# Patient Record
Sex: MALE | Race: WHITE | NOT HISPANIC OR LATINO
[De-identification: names, ages, dates, MRNs, and addresses within clinical notes are randomized per-mention and may not be internally consistent; named-entity substitution may affect disease eponyms.]

---

## 2017-05-07 ENCOUNTER — HOSPITAL (OUTPATIENT)
Dept: OTHER | Age: 22
End: 2017-05-07
Attending: EMERGENCY MEDICINE

## 2017-05-07 LAB
AMPHETAMINES UR QL SCN>500 NG/ML: NEGATIVE
ANALYZER ANC (IANC): ABNORMAL
ANION GAP SERPL CALC-SCNC: 12 MMOL/L (ref 10–20)
BARBITURATES UR QL SCN>200 NG/ML: NEGATIVE
BASOPHILS # BLD: 0.1 THOUSAND/MCL (ref 0–0.3)
BASOPHILS NFR BLD: 1 %
BENZODIAZ UR QL SCN>200 NG/ML: NEGATIVE
BUN SERPL-MCNC: 18 MG/DL (ref 6–20)
BUN/CREAT SERPL: 19 (ref 7–25)
BZE UR QL SCN>150 NG/ML: NEGATIVE
CALCIUM SERPL-MCNC: 9.1 MG/DL (ref 8.4–10.2)
CANNABINOIDS UR QL SCN>50 NG/ML: NEGATIVE
CHLORIDE: 103 MMOL/L (ref 98–107)
CO2 SERPL-SCNC: 30 MMOL/L (ref 21–32)
CREAT SERPL-MCNC: 0.93 MG/DL (ref 0.67–1.17)
DIFFERENTIAL METHOD BLD: ABNORMAL
EOSINOPHIL # BLD: 0.2 THOUSAND/MCL (ref 0.1–0.5)
EOSINOPHIL NFR BLD: 2 %
ERYTHROCYTE [DISTWIDTH] IN BLOOD: 12.5 % (ref 11–15)
GLUCOSE SERPL-MCNC: 101 MG/DL (ref 65–99)
HEMATOCRIT: 46.4 % (ref 39–51)
HGB BLD-MCNC: 16.3 GM/DL (ref 13–17)
LYMPHOCYTES # BLD: 3.2 THOUSAND/MCL (ref 1–4.8)
LYMPHOCYTES NFR BLD: 26 %
MCH RBC QN AUTO: 31.1 PG (ref 26–34)
MCHC RBC AUTO-ENTMCNC: 35.1 GM/DL (ref 32–36.5)
MCV RBC AUTO: 88.5 FL (ref 78–100)
MONOCYTES # BLD: 1.2 THOUSAND/MCL (ref 0.3–0.9)
MONOCYTES NFR BLD: 10 %
NEUTROPHILS # BLD: 7.8 THOUSAND/MCL (ref 1.8–7.7)
NEUTROPHILS NFR BLD: 61 %
NEUTS SEG NFR BLD: ABNORMAL %
OPIATES UR QL SCN>300 NG/ML: NEGATIVE
PCP UR QL SCN>25 NG/ML: NEGATIVE
PERCENT NRBC: ABNORMAL
PLATELET # BLD: 277 THOUSAND/MCL (ref 140–450)
POTASSIUM SERPL-SCNC: 4.1 MMOL/L (ref 3.4–5.1)
RBC # BLD: 5.24 MILLION/MCL (ref 4.5–5.9)
SODIUM SERPL-SCNC: 141 MMOL/L (ref 135–145)
TSH SERPL-ACNC: 3.36 MCUNIT/ML (ref 0.35–5)
WBC # BLD: 12.6 THOUSAND/MCL (ref 4.2–11)

## 2017-12-06 ENCOUNTER — LAB ENCOUNTER (OUTPATIENT)
Dept: LAB | Facility: HOSPITAL | Age: 22
End: 2017-12-06
Attending: INTERNAL MEDICINE
Payer: COMMERCIAL

## 2017-12-06 DIAGNOSIS — R53.83 FATIGUE: ICD-10-CM

## 2017-12-06 DIAGNOSIS — E16.2 HYPOGLYCEMIA, UNSPECIFIED: Primary | ICD-10-CM

## 2017-12-06 DIAGNOSIS — F41.9 ANXIETY HYPERVENTILATION: ICD-10-CM

## 2017-12-06 DIAGNOSIS — F45.8 ANXIETY HYPERVENTILATION: ICD-10-CM

## 2017-12-06 PROCEDURE — 86376 MICROSOMAL ANTIBODY EACH: CPT

## 2017-12-06 PROCEDURE — 84443 ASSAY THYROID STIM HORMONE: CPT

## 2017-12-06 PROCEDURE — 81001 URINALYSIS AUTO W/SCOPE: CPT

## 2017-12-06 PROCEDURE — 83036 HEMOGLOBIN GLYCOSYLATED A1C: CPT

## 2017-12-06 PROCEDURE — 36415 COLL VENOUS BLD VENIPUNCTURE: CPT

## 2017-12-06 PROCEDURE — 82607 VITAMIN B-12: CPT

## 2017-12-06 PROCEDURE — 82306 VITAMIN D 25 HYDROXY: CPT

## 2017-12-06 PROCEDURE — 84681 ASSAY OF C-PEPTIDE: CPT

## 2017-12-06 PROCEDURE — 84439 ASSAY OF FREE THYROXINE: CPT

## 2017-12-06 PROCEDURE — 85025 COMPLETE CBC W/AUTO DIFF WBC: CPT

## 2017-12-06 PROCEDURE — 80053 COMPREHEN METABOLIC PANEL: CPT

## 2017-12-06 PROCEDURE — 84146 ASSAY OF PROLACTIN: CPT

## 2018-01-11 ENCOUNTER — OFFICE VISIT (OUTPATIENT)
Dept: SURGERY | Facility: CLINIC | Age: 23
End: 2018-01-11

## 2018-01-11 VITALS
HEART RATE: 80 BPM | BODY MASS INDEX: 28.23 KG/M2 | TEMPERATURE: 98 F | WEIGHT: 220 LBS | HEIGHT: 74 IN | SYSTOLIC BLOOD PRESSURE: 126 MMHG | RESPIRATION RATE: 16 BRPM | DIASTOLIC BLOOD PRESSURE: 80 MMHG

## 2018-01-11 DIAGNOSIS — R31.29 MICROHEMATURIA: Primary | ICD-10-CM

## 2018-01-11 PROCEDURE — 99204 OFFICE O/P NEW MOD 45 MIN: CPT | Performed by: UROLOGY

## 2018-01-11 PROCEDURE — 99212 OFFICE O/P EST SF 10 MIN: CPT | Performed by: UROLOGY

## 2018-01-11 RX ORDER — ALPRAZOLAM 1 MG/1
1 TABLET ORAL NIGHTLY PRN
Refills: 0 | COMMUNITY
Start: 2017-12-28 | End: 2021-02-12 | Stop reason: ALTCHOICE

## 2018-01-11 NOTE — PROGRESS NOTES
SUBJECTIVE:  Dennie Martens is a 23year old male who is a new patient to our department, and presents with a chief complaint of dysuria and hematuria. He states that the problem is unchanged.  Symptoms include noted on a recent urine test December 6, 2017 to and oriented times three, pleasant and cooperative.   CARDIOVASCULAR:normal apical impulse  RESPIRATORY: no chest wall deformities or tenderness  ABDOMEN: abdomen is soft without significant tenderness, masses, organomegaly or guarding  GENITOURINARY:

## 2018-02-15 ENCOUNTER — TELEPHONE (OUTPATIENT)
Dept: SURGERY | Facility: CLINIC | Age: 23
End: 2018-02-15

## 2018-02-15 NOTE — TELEPHONE ENCOUNTER
Patient cancelled appt for 02/19/18 for Uroflow bladder US with IRINA  is looking to reschedule. Please call.  Thank you

## 2018-02-21 NOTE — TELEPHONE ENCOUNTER
Spoke with patient states that he does not want to reschedule until a couple of months from now and will call back to reschedule.

## 2018-04-13 ENCOUNTER — OFFICE VISIT (OUTPATIENT)
Dept: FAMILY MEDICINE CLINIC | Facility: CLINIC | Age: 23
End: 2018-04-13

## 2018-04-13 VITALS
WEIGHT: 217 LBS | DIASTOLIC BLOOD PRESSURE: 72 MMHG | HEIGHT: 74 IN | TEMPERATURE: 98 F | SYSTOLIC BLOOD PRESSURE: 106 MMHG | HEART RATE: 99 BPM | BODY MASS INDEX: 27.85 KG/M2

## 2018-04-13 DIAGNOSIS — F41.9 ANXIETY: ICD-10-CM

## 2018-04-13 DIAGNOSIS — Z00.00 ROUTINE GENERAL MEDICAL EXAMINATION AT A HEALTH CARE FACILITY: Primary | ICD-10-CM

## 2018-04-13 PROCEDURE — 99385 PREV VISIT NEW AGE 18-39: CPT | Performed by: PHYSICIAN ASSISTANT

## 2018-04-13 RX ORDER — BUSPIRONE HYDROCHLORIDE 10 MG/1
TABLET ORAL 2 TIMES DAILY
COMMUNITY

## 2018-04-13 NOTE — PROGRESS NOTES
HPI:   Farhana Lipscomb is a 23year old male who presents for new patient visit today. He has history of anxiety which is well controlled on current medication regimen. He takes buspar twice daily and takes alprazolam at night.   He sees psychatric nurse prac Denies chest pain, chest pressure, chest discomfort, palpitations, edema, dyspnea on exertion or at rest.  RESPIRATORY:  Denies shortness of breath, wheezing, cough or sputum.   GASTROINTESTINAL:  Denies abdominal pain, nausea, vomiting, constipation, diarr tenderness, no spasm. EXTREMITIES:  No edema, no cyanosis, no clubbing, FROM, 2+ dorsalis pedis pulses bilaterally. NEURO:  No deficit, normal gait, strength and tone, sensory intact.     Assessment and Plan:     Routine general medical examination at a h

## 2018-06-09 ENCOUNTER — HOSPITAL (OUTPATIENT)
Dept: OTHER | Age: 23
End: 2018-06-09
Attending: EMERGENCY MEDICINE

## 2018-06-11 ENCOUNTER — OFFICE VISIT (OUTPATIENT)
Dept: FAMILY MEDICINE CLINIC | Facility: CLINIC | Age: 23
End: 2018-06-11

## 2018-06-11 VITALS
SYSTOLIC BLOOD PRESSURE: 114 MMHG | TEMPERATURE: 98 F | BODY MASS INDEX: 28 KG/M2 | DIASTOLIC BLOOD PRESSURE: 76 MMHG | HEART RATE: 75 BPM | WEIGHT: 218 LBS

## 2018-06-11 DIAGNOSIS — S01.81XD FACIAL LACERATION, SUBSEQUENT ENCOUNTER: ICD-10-CM

## 2018-06-11 DIAGNOSIS — S06.0X0D CONCUSSION WITHOUT LOSS OF CONSCIOUSNESS, SUBSEQUENT ENCOUNTER: ICD-10-CM

## 2018-06-11 DIAGNOSIS — S09.90XD INJURY OF HEAD, SUBSEQUENT ENCOUNTER: Primary | ICD-10-CM

## 2018-06-11 PROCEDURE — 99212 OFFICE O/P EST SF 10 MIN: CPT | Performed by: FAMILY MEDICINE

## 2018-06-11 PROCEDURE — 99214 OFFICE O/P EST MOD 30 MIN: CPT | Performed by: FAMILY MEDICINE

## 2018-06-11 RX ORDER — NAPROXEN 500 MG/1
500 TABLET ORAL 2 TIMES DAILY WITH MEALS
Qty: 30 TABLET | Refills: 1 | Status: SHIPPED | OUTPATIENT
Start: 2018-06-11 | End: 2021-01-18

## 2018-06-11 RX ORDER — HYDROCODONE BITARTRATE AND ACETAMINOPHEN 7.5; 325 MG/1; MG/1
1 TABLET ORAL EVERY 6 HOURS PRN
Qty: 20 TABLET | Refills: 0 | Status: SHIPPED | OUTPATIENT
Start: 2018-06-11 | End: 2018-06-16

## 2018-06-11 NOTE — PROGRESS NOTES
HPI:    Patient ID: Dario Barba is a 23year old male. HPI  Patient presents with:  ER F/U: f/u [facility]- 3 stitches on right eyebrow, concussion  Had too much alcohol to drink and lost balance and fell.  Seen and treated and now mild to moderate Imaging & Referrals:  None       SC#[id number]

## 2018-06-15 ENCOUNTER — TELEPHONE (OUTPATIENT)
Dept: OTHER | Age: 23
End: 2018-06-15

## 2018-06-15 NOTE — TELEPHONE ENCOUNTER
Pt is calling for status of his medication refill request. Pt can be reached at [phone]. Per pt he is out of medication.

## 2018-06-15 NOTE — TELEPHONE ENCOUNTER
Please advise on refill request.     Recent Outpatient Visits            4 days ago Injury of head, subsequent encounter    [facility], [address] [facility],     Office Visit    2 months ago Routine general medical examination at a health care facility    [facility], [address] [facility]    Office Visit    5 months ago [place] for Maria C Ellsworth MD    Office Visit        Future Appointments       Provider Department Appt Notes    Tomorrow Suresh Gupta MD [facility], [place] S/p concussion, intermittent h/a, nausea, fogginess.

## 2018-06-15 NOTE — TELEPHONE ENCOUNTER
Pt called back to check on status of message. Anxious about being out of [place].   Pt was scheduled for appt tomorrow with Dr. Lexii Avila due to H/A, nausea, \"fogginess\"  Please advise.

## 2018-06-15 NOTE — TELEPHONE ENCOUNTER
LOV 6/11/18 for concussion. Head now worsening with c/o \"  Right side of head pain concentrated and sharper . Feels like I have water in ear . Previous dull throb  Now constant migraine pain w/o light sensitivity. \" + nausea, no vomit.  + abd cramp no

## 2018-06-15 NOTE — TELEPHONE ENCOUNTER
Please note.  To Dr. Sandeep Silverman as [employer] since pt has OV appt tomorrow 18 @11:50am    Pt calling back (Name and  verified) stating that he was told before not to take Ibuprofen if he is taking Naproxen, so pt states that he will not take Ibuprofen at this ti

## 2018-06-15 NOTE — TELEPHONE ENCOUNTER
Spoke with patient (identified name and ) ,advised Dr Chetan Harris note  and agrees with  plan. Note      No I am not refilling norco if he has appt tomorrow. Use advil 600mg TID for now.

## 2018-06-16 ENCOUNTER — OFFICE VISIT (OUTPATIENT)
Dept: FAMILY MEDICINE CLINIC | Facility: CLINIC | Age: 23
End: 2018-06-16

## 2018-06-16 VITALS
TEMPERATURE: 98 F | DIASTOLIC BLOOD PRESSURE: 74 MMHG | SYSTOLIC BLOOD PRESSURE: 114 MMHG | RESPIRATION RATE: 20 BRPM | HEIGHT: 74 IN | BODY MASS INDEX: 28.49 KG/M2 | HEART RATE: 76 BPM | WEIGHT: 222 LBS

## 2018-06-16 DIAGNOSIS — S09.90XD INJURY OF HEAD, SUBSEQUENT ENCOUNTER: ICD-10-CM

## 2018-06-16 DIAGNOSIS — S01.81XD FACIAL LACERATION, SUBSEQUENT ENCOUNTER: ICD-10-CM

## 2018-06-16 PROCEDURE — 99213 OFFICE O/P EST LOW 20 MIN: CPT | Performed by: FAMILY MEDICINE

## 2018-06-16 PROCEDURE — 99212 OFFICE O/P EST SF 10 MIN: CPT | Performed by: FAMILY MEDICINE

## 2018-06-16 RX ORDER — HYDROCODONE BITARTRATE AND ACETAMINOPHEN 7.5; 325 MG/1; MG/1
1 TABLET ORAL EVERY 6 HOURS PRN
Qty: 40 TABLET | Refills: 0 | OUTPATIENT
Start: 2018-06-16 | End: 2018-07-16

## 2018-06-16 RX ORDER — HYDROCODONE BITARTRATE AND ACETAMINOPHEN 7.5; 325 MG/1; MG/1
1 TABLET ORAL EVERY 6 HOURS PRN
Qty: 30 TABLET | Refills: 0 | Status: SHIPPED | OUTPATIENT
Start: 2018-06-16 | End: 2018-06-23

## 2018-06-16 NOTE — PROGRESS NOTES
HPI:    Patient ID: Kevin Ibanez is a 23year old male. Pt presents for follow up from a momo office visit and visit to the ER. Was diagnosed with a head injury/ concussion. Had negative CT scan. Patient is being treated with norco for pain.  Patient well-nourished. Eyes: Three intermittent sutures right eye brow. Appears to be dissolvable    Neurological: No cranial nerve deficit.    Vitals reviewed.                  ASSESSMENT/PLAN:  Injury of head, subsequent encounter  (primary encounter diagno well-nourished. HENT:   Head: Normocephalic.    Right Ear: Tympanic membrane and ear canal normal. Right ear foreign body: some wax in ear canal.   Left Ear: Tympanic membrane and ear canal normal.   Mouth/Throat: Oropharynx is clear and moist.   Imelda Mail

## 2018-06-23 NOTE — TELEPHONE ENCOUNTER
Patient calling and requesting refill for his norco, still with headache, advised that Dr Pedro Rodríguez is not in the office until Monday,advised that will call back on Monday for the refill.   Was checking the last OV on 6/16/18 and called patient back, states that

## 2018-06-25 ENCOUNTER — TELEPHONE (OUTPATIENT)
Dept: OTHER | Age: 23
End: 2018-06-25

## 2018-06-25 RX ORDER — HYDROCODONE BITARTRATE AND ACETAMINOPHEN 7.5; 325 MG/1; MG/1
1 TABLET ORAL EVERY 6 HOURS PRN
Qty: 30 TABLET | Refills: 0 | Status: SHIPPED | OUTPATIENT
Start: 2018-06-25 | End: 2018-07-03

## 2018-06-25 NOTE — TELEPHONE ENCOUNTER
Message noted: Chart reviewed and may refill medication as requested times one. Script printed and left at  here at [facility]. Please notify patient.

## 2018-06-25 NOTE — TELEPHONE ENCOUNTER
Pharmacy is needing authorization to continue to fill since pt is also taking alprazolam.     CB #[id number] [facility] [place]

## 2018-06-25 NOTE — TELEPHONE ENCOUNTER
.Patient notified Rx ready for  at [facility] . Girlfriend Patti Roberto will be picking up.

## 2018-06-30 ENCOUNTER — TELEPHONE (OUTPATIENT)
Dept: FAMILY MEDICINE CLINIC | Facility: CLINIC | Age: 23
End: 2018-06-30

## 2018-06-30 NOTE — TELEPHONE ENCOUNTER
Patient called having severe migraines. He states he would like a prescription for hydrocodone to be sent to the pharmacy. Looking through his chart appears patient had a concussion and head injury.   He was seen by Dr. Marlee Tapia on June 25 and given 30 pills o

## 2018-07-02 RX ORDER — HYDROCODONE BITARTRATE AND ACETAMINOPHEN 7.5; 325 MG/1; MG/1
1 TABLET ORAL EVERY 6 HOURS PRN
Qty: 30 TABLET | Refills: 0 | OUTPATIENT
Start: 2018-07-02 | End: 2018-08-01

## 2018-07-02 NOTE — TELEPHONE ENCOUNTER
Pt requesting Hydrocodone-acetaminophen refill. States he talked with on-call doctor on Saturday, was given recommendations to go to ER for severe headaches. Pt states he can't afford to go to ER and has appt 7/3/18 with Dr. Marlee Tapia.      Hydrocodone last fille

## 2018-07-02 NOTE — TELEPHONE ENCOUNTER
Pt is upset states he cant afford urgent care visit has appt with you tomorrow wants to know what to do in the mean time.

## 2018-07-02 NOTE — TELEPHONE ENCOUNTER
Pt contacted. Pt states headache more at base of skull now. Pt did not hit that part of head. To take otc remedies for now. Reviewed head injury symptoms and ER / urgent care if sig symptoms tonight. Will not refill norco for now.

## 2018-07-02 NOTE — TELEPHONE ENCOUNTER
Message noted. If having sig symptoms now, would advise at least urgent care visit to evaluate post concussion if cannot go to ER. Was advised by Dr Anne Lara for more timely evaluation.

## 2018-07-03 ENCOUNTER — OFFICE VISIT (OUTPATIENT)
Dept: FAMILY MEDICINE CLINIC | Facility: CLINIC | Age: 23
End: 2018-07-03

## 2018-07-03 VITALS
HEART RATE: 72 BPM | WEIGHT: 222 LBS | DIASTOLIC BLOOD PRESSURE: 74 MMHG | HEIGHT: 74 IN | BODY MASS INDEX: 28.49 KG/M2 | SYSTOLIC BLOOD PRESSURE: 111 MMHG | RESPIRATION RATE: 18 BRPM | TEMPERATURE: 98 F

## 2018-07-03 DIAGNOSIS — M54.2 NECK PAIN: Primary | ICD-10-CM

## 2018-07-03 DIAGNOSIS — S09.90XD INJURY OF HEAD, SUBSEQUENT ENCOUNTER: ICD-10-CM

## 2018-07-03 PROCEDURE — 99213 OFFICE O/P EST LOW 20 MIN: CPT | Performed by: FAMILY MEDICINE

## 2018-07-03 PROCEDURE — 99212 OFFICE O/P EST SF 10 MIN: CPT | Performed by: FAMILY MEDICINE

## 2018-07-03 RX ORDER — HYDROCODONE BITARTRATE AND ACETAMINOPHEN 7.5; 325 MG/1; MG/1
1 TABLET ORAL EVERY 6 HOURS PRN
Qty: 30 TABLET | Refills: 0 | Status: SHIPPED | OUTPATIENT
Start: 2018-07-03 | End: 2018-07-30

## 2018-07-03 RX ORDER — CYCLOBENZAPRINE HCL 10 MG
10 TABLET ORAL NIGHTLY
Qty: 15 TABLET | Refills: 0 | Status: SHIPPED | OUTPATIENT
Start: 2018-07-03 | End: 2021-01-18

## 2018-07-03 NOTE — PROGRESS NOTES
HPI:    Patient ID: Dario Barba is a 23year old male. Pt presents with persistent headache after a head injury about one month ago. Pt fell backward and his his head at a bar. Pt had head CT done at [facility] ER.  Pt also had laceration of forehea Oropharynx is clear and moist.   Eyes: Conjunctivae are normal.   Neck: Neck supple. Muscular tenderness (tightness or the trapezius area) present. No spinous process tenderness present.    Cardiovascular: Normal rate, regular rhythm and normal heart sounds

## 2018-07-20 RX ORDER — HYDROCODONE BITARTRATE AND ACETAMINOPHEN 7.5; 325 MG/1; MG/1
1 TABLET ORAL EVERY 6 HOURS PRN
Qty: 30 TABLET | Refills: 0 | OUTPATIENT
Start: 2018-07-20 | End: 2018-08-19

## 2018-07-20 NOTE — TELEPHONE ENCOUNTER
Per pt, he needs refill on his [place], pt is out of med. Current Outpatient Prescriptions:  hydrocodone-acetaminophen 7.5-325 MG Oral Tab Take 1 tablet by mouth every 6 (six) hours as needed for Pain.  Disp: 30 tablet Rfl: 0

## 2018-07-20 NOTE — TELEPHONE ENCOUNTER
Please advise for NHP; per message below, pt is out of [place].    Last Rx: 7/3/18 #30     Medication pending for review. If approved, please print, sign, and ask on-site staff to inform pt when ready for .  Please respond to pool: DAVID [facility] LPN/CMA      Recent Outpatient Visits            2 weeks ago Neck pain    Jeronimo Bonilla MD    Office Visit    1 month ago Injury of head, subsequent encounter    Jeronimo Bonilla MD    Office Visit    1 month ago Injury of head, subsequent encounter    [facility], [facility]    Office Visit    3 months ago Routine general medical examination at a health care facility    [facility] [place], [address] [facility] [place], [place]    Office Visit    6 months ago Jose Sheikh MD    Office Visit

## 2018-07-30 RX ORDER — HYDROCODONE BITARTRATE AND ACETAMINOPHEN 7.5; 325 MG/1; MG/1
1 TABLET ORAL EVERY 6 HOURS PRN
Qty: 30 TABLET | Refills: 0 | Status: SHIPPED | OUTPATIENT
Start: 2018-07-30 | End: 2018-08-29

## 2018-07-30 NOTE — TELEPHONE ENCOUNTER
Pt states that his girlfriend Isis Zhang will be picking up his script. Pt is giving authorization for her to do so. Pt was also informe that she will be asked for an ID. Pt can be reached at [phone].

## 2018-07-30 NOTE — TELEPHONE ENCOUNTER
Message noted: Chart reviewed and may refill medication as requested times one. Script printed and left at  here at [facility]. Please notify patient. [place]. No concerns noted.  Pt should do xray as ordered for his

## 2018-07-30 NOTE — TELEPHONE ENCOUNTER
Current Outpatient Prescriptions:   •  hydrocodone-acetaminophen 7.5-325 MG Oral Tab, Take 1 tablet by mouth every 6 (six) hours as needed for Pain., Disp: 30 tablet, Rfl: 0  •     PT HAS AN APPT 08-11-18 WITH DR YOON.  HE IS OUT OF MEDS AND WOULD LIKE A R

## 2021-01-18 ENCOUNTER — OFFICE VISIT (OUTPATIENT)
Dept: FAMILY MEDICINE CLINIC | Facility: CLINIC | Age: 26
End: 2021-01-18
Payer: COMMERCIAL

## 2021-01-18 VITALS
HEIGHT: 74 IN | BODY MASS INDEX: 27.72 KG/M2 | DIASTOLIC BLOOD PRESSURE: 73 MMHG | WEIGHT: 216 LBS | SYSTOLIC BLOOD PRESSURE: 112 MMHG | HEART RATE: 97 BPM

## 2021-01-18 DIAGNOSIS — S09.90XA CLOSED HEAD INJURY, INITIAL ENCOUNTER: Primary | ICD-10-CM

## 2021-01-18 DIAGNOSIS — S06.0X1D CONCUSSION WITH LOSS OF CONSCIOUSNESS OF 30 MINUTES OR LESS, SUBSEQUENT ENCOUNTER: ICD-10-CM

## 2021-01-18 PROCEDURE — 99214 OFFICE O/P EST MOD 30 MIN: CPT | Performed by: FAMILY MEDICINE

## 2021-01-18 PROCEDURE — 3078F DIAST BP <80 MM HG: CPT | Performed by: FAMILY MEDICINE

## 2021-01-18 PROCEDURE — 3008F BODY MASS INDEX DOCD: CPT | Performed by: FAMILY MEDICINE

## 2021-01-18 PROCEDURE — 3074F SYST BP LT 130 MM HG: CPT | Performed by: FAMILY MEDICINE

## 2021-01-18 RX ORDER — HYDROCODONE BITARTRATE AND ACETAMINOPHEN 5; 325 MG/1; MG/1
1 TABLET ORAL EVERY 4 HOURS PRN
Qty: 20 TABLET | Refills: 0 | Status: SHIPPED | OUTPATIENT
Start: 2021-01-18 | End: 2021-01-27

## 2021-01-18 RX ORDER — AMOXICILLIN 250 MG
1 CAPSULE ORAL DAILY
COMMUNITY
Start: 2021-01-08 | End: 2021-01-27

## 2021-01-18 RX ORDER — ONDANSETRON 4 MG/1
4 TABLET, ORALLY DISINTEGRATING ORAL
COMMUNITY
Start: 2021-01-17 | End: 2021-01-27

## 2021-01-18 RX ORDER — IBUPROFEN 800 MG/1
800 TABLET ORAL 3 TIMES DAILY
Qty: 270 TABLET | Refills: 3 | Status: SHIPPED | OUTPATIENT
Start: 2021-01-18 | End: 2021-11-29

## 2021-01-18 RX ORDER — HYDROXYZINE HYDROCHLORIDE 25 MG/1
25 TABLET, FILM COATED ORAL EVERY 6 HOURS PRN
COMMUNITY
Start: 2021-01-15

## 2021-01-18 NOTE — PROGRESS NOTES
HPI:    Patient ID: Mark Post is a 30year old male.     HPI  Patient presents with:  Hospital F/U: hospital follow up from [address], hurting face, on sunday morning   Head Neck Injury: having concussion, with headache   Seen in a ED and then returned to [facility] involved. Will go on prescription ibuprofen three times daily for the week and norco as needed. He agreed to not take alprazolam same day as the norco.  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 He agrees.

## 2021-01-19 ENCOUNTER — TELEMEDICINE (OUTPATIENT)
Dept: FAMILY MEDICINE CLINIC | Facility: CLINIC | Age: 26
End: 2021-01-19
Payer: COMMERCIAL

## 2021-01-19 ENCOUNTER — TELEPHONE (OUTPATIENT)
Dept: FAMILY MEDICINE CLINIC | Facility: CLINIC | Age: 26
End: 2021-01-19

## 2021-01-19 DIAGNOSIS — S09.90XD INJURY OF HEAD, SUBSEQUENT ENCOUNTER: Primary | ICD-10-CM

## 2021-01-19 PROCEDURE — 99213 OFFICE O/P EST LOW 20 MIN: CPT | Performed by: FAMILY MEDICINE

## 2021-01-19 RX ORDER — HYDROCODONE BITARTRATE AND ACETAMINOPHEN 10; 325 MG/1; MG/1
1 TABLET ORAL EVERY 6 HOURS PRN
Qty: 30 TABLET | Refills: 0 | Status: SHIPPED | OUTPATIENT
Start: 2021-01-19 | End: 2021-01-25

## 2021-01-19 NOTE — PROGRESS NOTES
HPI:    Patient ID: Alejandro Lackey is a 30year old male. This visit is conducted using Telemedicine with live, interactive video and audio during this Coronavirus pandemic.     Please note that the following visit was completed using two-way, real-time int Negative for visual disturbance. Respiratory: Negative for cough, shortness of breath and wheezing. Cardiovascular: Negative for chest pain. Gastrointestinal: Negative for nausea and vomiting. Neurological: Positive for headaches.  Negative for diz recommendations and agrees to plan. VIDEO VISIT done via [employer]    No orders of the defined types were placed in this encounter.       Meds This Visit:  Requested Prescriptions     Signed Prescriptions Disp Refills   • HYDROcodone-acetaminophen (Humberto Hennessy)

## 2021-01-19 NOTE — TELEPHONE ENCOUNTER
Spoke with pt,  verified, pt stated he was seen yesterday by Dr Js Osuna for head injury. Pt was seen in ER x2. Pt stated he was rx'd yesterday for Ibuprofen 800 mg and norco 5 mg and its barely helping the pain.    He think Ibuprofen is not helping t

## 2021-01-19 NOTE — TELEPHONE ENCOUNTER
Spoke with patient ( verified) and relayed Dr. Zachery Grover message below--patient verbalizes understanding, but requests ER f/u [facility] (lives 1 1/2 hrs away--declines in office visit) with Dr. Yuniel Neely, not Dr. Melissa Rios.      [facility] appt made for today at 4:

## 2021-01-25 ENCOUNTER — TELEPHONE (OUTPATIENT)
Dept: FAMILY MEDICINE CLINIC | Facility: CLINIC | Age: 26
End: 2021-01-25

## 2021-01-25 RX ORDER — HYDROCODONE BITARTRATE AND ACETAMINOPHEN 10; 325 MG/1; MG/1
1 TABLET ORAL EVERY 6 HOURS PRN
Qty: 30 TABLET | Refills: 0 | Status: SHIPPED | OUTPATIENT
Start: 2021-01-25 | End: 2021-01-30

## 2021-01-25 NOTE — TELEPHONE ENCOUNTER
pt stated that he had a video visit with you on 1/19/2021. Pt stated that he continues to have the headaches/migraines. Per pt the headaches are the same have not gotten worse but they have not subside.  Pt stated that you had informed him that his h

## 2021-01-25 NOTE — TELEPHONE ENCOUNTER
Message noted. May refill norco as requested. Erx sent to listed pharmacy. To follow up for appointment if not better; should discuss disability with neurology. If severe headaches or nausea / vomiting, ER or immediate care evaluation.  Please notify patien

## 2021-01-27 ENCOUNTER — OFFICE VISIT (OUTPATIENT)
Dept: NEUROLOGY | Facility: CLINIC | Age: 26
End: 2021-01-27
Payer: COMMERCIAL

## 2021-01-27 VITALS
HEART RATE: 104 BPM | SYSTOLIC BLOOD PRESSURE: 138 MMHG | HEIGHT: 75 IN | WEIGHT: 220 LBS | DIASTOLIC BLOOD PRESSURE: 72 MMHG | BODY MASS INDEX: 27.35 KG/M2

## 2021-01-27 DIAGNOSIS — Z82.0 FAMILY HISTORY OF SEIZURE DISORDER: ICD-10-CM

## 2021-01-27 DIAGNOSIS — F07.81 POST CONCUSSION SYNDROME: ICD-10-CM

## 2021-01-27 DIAGNOSIS — R40.20 LOSS OF CONSCIOUSNESS (HCC): Primary | ICD-10-CM

## 2021-01-27 PROCEDURE — 99205 OFFICE O/P NEW HI 60 MIN: CPT | Performed by: OTHER

## 2021-01-27 PROCEDURE — 3078F DIAST BP <80 MM HG: CPT | Performed by: OTHER

## 2021-01-27 PROCEDURE — 3008F BODY MASS INDEX DOCD: CPT | Performed by: OTHER

## 2021-01-27 PROCEDURE — 3075F SYST BP GE 130 - 139MM HG: CPT | Performed by: OTHER

## 2021-01-27 RX ORDER — TOPIRAMATE 25 MG/1
TABLET ORAL
Qty: 106 TABLET | Refills: 0 | Status: SHIPPED | OUTPATIENT
Start: 2021-01-27 | End: 2021-02-02

## 2021-01-27 RX ORDER — ESCITALOPRAM OXALATE 5 MG/1
5 TABLET ORAL EVERY MORNING
COMMUNITY
Start: 2021-01-15 | End: 2021-11-29

## 2021-01-27 RX ORDER — BUSPIRONE HYDROCHLORIDE 15 MG/1
15 TABLET ORAL EVERY MORNING
COMMUNITY
Start: 2021-01-15 | End: 2021-01-27

## 2021-01-27 RX ORDER — BUSPIRONE HYDROCHLORIDE 30 MG/1
30 TABLET ORAL NIGHTLY
COMMUNITY
Start: 2021-01-15

## 2021-01-27 RX ORDER — MELATONIN
Qty: 90 TABLET | Refills: 0 | Status: SHIPPED | OUTPATIENT
Start: 2021-01-27 | End: 2021-02-26

## 2021-01-27 NOTE — PROGRESS NOTES
Viki [facility] 35  NEW PATIENT EVALUATION  Reason for Admission/Consultation:  TBI/LOC    Requested by: No referring provider defined for this encounter.     PCP: Keily Thompson DO    Chief Complaint: Post-Concussion Syndrome (Patient presen 15 min. He states that he does have a history of a febrile seizure in childhood. His sister also has seizures which are posttraumatic after she fell down a flight of steps. We discussed the possibility the patient may have had a seizure.   I explai the patient that I was attempting to choose a medication for him, but I wanted him to be aware of the adverse effects so that he did not start a medication and then suddenly stop it.   Patient states that he is very sensitive to the adverse effects of medic TOTAL RATING ALL SECTIONS 0      Taking norco for headaches; when he has the  Medication they are a 2-3/10; When he wakes up it feels like a shoving an ice pick the back of his right eye; radiates circumferentially ; no lacrimation;        Exertion: Do t records  1. ROS:  Pertinent positive and negatives per HPI. All others were reviewed and negative.       Past Medical History:   Diagnosis Date   • Anxiety    • Reactive hypoglycemia          Current Outpatient Medications:   •  busPIRone HCl 30 MG O rhythm   Carotids: no bruits  - Pulmonary: CTAB  Neurologic Exam  - Mental Status: Alert and attentive. Oriented to person, place, time/date, situation, day of week, month of year and year. Speech is spontaneous, fluent, and prosodic.  Comprehension and re walking:  - Plantar response: flexor bilaterally      Data reviewed    Most recent lab results:        Test results/Imaging:     Lab Results   Component Value Date    TSH 1.19 12/06/2017     No results found for: HDL, LDL, TRIG  Lab Results   Component Rosaura 10-year period.     Per the 2015 American Academy of neurology guidelines, the following patients with an unprovoked seizure should be started on antiepileptic drugs: Patients with a prior brain lesion or insult, epileptiform EEG abnormality, significant br understanding of information given. All questions were answered. All side effects of drugs were discussed.      Time spent for examination, counseling and coordination of care such as potential treatment options- 60 minutes with more than 50% of the time sp

## 2021-01-27 NOTE — PATIENT INSTRUCTIONS
You  Have post concussion headache. You may have had a seizure. Rest or short naps (<1 hour) if needed during the day - but not to interrupt ability to fall asleep at night.      Seizure safety w regards to safety of their infant:  Parent w seizures nury

## 2021-01-30 ENCOUNTER — TELEMEDICINE (OUTPATIENT)
Dept: FAMILY MEDICINE CLINIC | Facility: CLINIC | Age: 26
End: 2021-01-30

## 2021-01-30 ENCOUNTER — TELEPHONE (OUTPATIENT)
Dept: FAMILY MEDICINE CLINIC | Facility: CLINIC | Age: 26
End: 2021-01-30

## 2021-01-30 DIAGNOSIS — R51.9 NONINTRACTABLE EPISODIC HEADACHE, UNSPECIFIED HEADACHE TYPE: Primary | ICD-10-CM

## 2021-01-30 PROCEDURE — 99213 OFFICE O/P EST LOW 20 MIN: CPT | Performed by: FAMILY MEDICINE

## 2021-01-30 RX ORDER — HYDROCODONE BITARTRATE AND ACETAMINOPHEN 10; 325 MG/1; MG/1
1 TABLET ORAL EVERY 6 HOURS PRN
Qty: 18 TABLET | Refills: 0 | Status: SHIPPED | OUTPATIENT
Start: 2021-01-30 | End: 2021-02-02

## 2021-01-30 NOTE — TELEPHONE ENCOUNTER
Patient reports virtual visit today calling about script, confirmed script was sent today for Norco, advised to contact pharmacy to confirm . No other questions at this time.

## 2021-01-30 NOTE — PROGRESS NOTES
HPI:    Patient ID: Dario Barba is a 30year old male.     Telehealth outside of [facility] [address] Verbal Consent   I conducted a telehealth visit with Dario Barba today, 01/30/21, which was completed using two-way, real-time interactive audio and video c Constitutional: Positive for fatigue. Negative for appetite change, chills and diaphoresis. HENT: Negative. Respiratory: Negative for apnea, choking, shortness of breath and stridor. Cardiovascular: Negative.   Negative for chest pain, palpitation encounter.       Meds This Visit:  Requested Prescriptions      No prescriptions requested or ordered in this encounter       Imaging & Referrals:  None       #[id number]

## 2021-02-01 NOTE — TELEPHONE ENCOUNTER
Payton from [employer] is looking to CT Scans, Imaging or reports for this pt. IF so please fax # [phone] or upload to his [employer]/Epic.      Patient is coming to them at 1:30 pm  today

## 2021-02-01 NOTE — TELEPHONE ENCOUNTER
Patient lives in [facility] and would like to have his EEG performed at [facility]

## 2021-02-01 NOTE — TELEPHONE ENCOUNTER
Spoke to patient. He plans to get EEG done at [facility] in [facility] as it is much closer to him. Faxed order to [facility] at [phone]. Patient also wanted to let Dr. Lena Smith know that topamax 25 mg is making him anxious and not himself.  He is taking i

## 2021-02-01 NOTE — TELEPHONE ENCOUNTER
Spoke to [place] at [address]. She has access to epic but is not able to see ER reports. Printed imaging reports from ER and faxed over to [facility] to compare with imaging done today.

## 2021-02-02 ENCOUNTER — TELEMEDICINE (OUTPATIENT)
Dept: FAMILY MEDICINE CLINIC | Facility: CLINIC | Age: 26
End: 2021-02-02

## 2021-02-02 ENCOUNTER — TELEPHONE (OUTPATIENT)
Dept: FAMILY MEDICINE CLINIC | Facility: CLINIC | Age: 26
End: 2021-02-02

## 2021-02-02 DIAGNOSIS — R51.9 NONINTRACTABLE EPISODIC HEADACHE, UNSPECIFIED HEADACHE TYPE: ICD-10-CM

## 2021-02-02 DIAGNOSIS — S06.0X1D CONCUSSION WITH LOSS OF CONSCIOUSNESS OF 30 MINUTES OR LESS, SUBSEQUENT ENCOUNTER: ICD-10-CM

## 2021-02-02 PROCEDURE — 99213 OFFICE O/P EST LOW 20 MIN: CPT | Performed by: FAMILY MEDICINE

## 2021-02-02 RX ORDER — HYDROCODONE BITARTRATE AND ACETAMINOPHEN 10; 325 MG/1; MG/1
1 TABLET ORAL EVERY 6 HOURS PRN
Qty: 30 TABLET | Refills: 0 | Status: SHIPPED | OUTPATIENT
Start: 2021-02-02 | End: 2021-02-10

## 2021-02-02 NOTE — PROGRESS NOTES
HPI:    Patient ID: Shiv Cadena is a 30year old male. This visit is conducted using Telemedicine with live, interactive video and audio during this Coronavirus pandemic.     Please note that the following visit was completed using two-way, real-time int HYDROcodone-acetaminophen (NORCO)  MG Oral Tab Take 1 tablet by mouth every 6 (six) hours as needed for Pain. Medication may causes sedation, constipation. Not to operate heavy machinery or drive on medication.  30 tablet 0   • Propranolol HCl ER 61 M • HYDROcodone-acetaminophen (NORCO)  MG Oral Tab 30 tablet 0     Sig: Take 1 tablet by mouth every 6 (six) hours as needed for Pain. Medication may causes sedation, constipation. Not to operate heavy machinery or drive on medication.        Imaging

## 2021-02-02 NOTE — TELEPHONE ENCOUNTER
Spoke to patient to advise to stop taking topamax. States he has already stopped it yesterday. Advised Dr Lena Smith sent new Rx for propranalol. Patient will monitor pulse.

## 2021-02-02 NOTE — TELEPHONE ENCOUNTER
Scheduled video visit Dr Yee Gallegos today 4 pm. Advised on virtual visit process, billing; patient verbalized understanding. Patient was scheduled, he thought for today, but next week. He needs to see his PCP today, post-concussive f/u, migraine.

## 2021-02-05 ENCOUNTER — TELEPHONE (OUTPATIENT)
Dept: NEUROLOGY | Facility: CLINIC | Age: 26
End: 2021-02-05

## 2021-02-05 NOTE — TELEPHONE ENCOUNTER
Received fax from [facility] for short term disability. Consent from patient for authorization to release information to [facility] no signed by patient. Form placed on Dr Tevin vazquez for review and possible completion.     LOV with Dr Raheel Moblye was for bozena johanne

## 2021-02-10 ENCOUNTER — TELEPHONE (OUTPATIENT)
Dept: FAMILY MEDICINE CLINIC | Facility: CLINIC | Age: 26
End: 2021-02-10

## 2021-02-10 ENCOUNTER — PATIENT MESSAGE (OUTPATIENT)
Dept: FAMILY MEDICINE CLINIC | Facility: CLINIC | Age: 26
End: 2021-02-10

## 2021-02-10 RX ORDER — HYDROCODONE BITARTRATE AND ACETAMINOPHEN 10; 325 MG/1; MG/1
1 TABLET ORAL EVERY 6 HOURS PRN
Qty: 30 TABLET | Refills: 0 | Status: SHIPPED | OUTPATIENT
Start: 2021-02-10 | End: 2021-02-16

## 2021-02-10 RX ORDER — HYDROCODONE BITARTRATE AND ACETAMINOPHEN 10; 325 MG/1; MG/1
1 TABLET ORAL EVERY 6 HOURS PRN
Qty: 30 TABLET | Refills: 0 | OUTPATIENT
Start: 2021-02-10

## 2021-02-10 NOTE — TELEPHONE ENCOUNTER
From: Shiv Cadena  To: Cori Marcelo MD  Sent: 2/10/2021 12:38 PM CST  Subject: Other    Thank you Dr. Justus Marquez

## 2021-02-10 NOTE — TELEPHONE ENCOUNTER
Message noted: Chart reviewed and may refill norco as requested times one. Script sent to listed pharmacy by [facility]. [place] No concerns noted. Pt notified via [address]

## 2021-02-10 NOTE — TELEPHONE ENCOUNTER
The patient is requesting pain medications. Last given on 2/2/21 and only has 4 left. Was seen on 2/2/21 by Dr. Abbie Holland for post concussion. Since than every morning upon waking up his eyes are sensitive to light which causes his head to hurt.   Corinne Herald

## 2021-02-16 ENCOUNTER — TELEPHONE (OUTPATIENT)
Dept: FAMILY MEDICINE CLINIC | Facility: CLINIC | Age: 26
End: 2021-02-16

## 2021-02-16 RX ORDER — HYDROCODONE BITARTRATE AND ACETAMINOPHEN 10; 325 MG/1; MG/1
1 TABLET ORAL EVERY 6 HOURS PRN
Qty: 30 TABLET | Refills: 0 | Status: SHIPPED | OUTPATIENT
Start: 2021-02-16 | End: 2021-02-23

## 2021-02-16 NOTE — TELEPHONE ENCOUNTER
The patient is calling for a refill on his Norco    He stated has used all the Norco from 2/10/21. He has constant Headaches and takes a different amount of the Noroc depending on his headaches.

## 2021-02-23 RX ORDER — HYDROCODONE BITARTRATE AND ACETAMINOPHEN 10; 325 MG/1; MG/1
1 TABLET ORAL EVERY 6 HOURS PRN
Qty: 30 TABLET | Refills: 0 | Status: SHIPPED | OUTPATIENT
Start: 2021-02-23 | End: 2021-03-01

## 2021-02-23 NOTE — TELEPHONE ENCOUNTER
Message noted: Chart reviewed and may refill norco as requested times one. Script sent to listed pharmacy by [facility]. [place] No concerns noted.  Please notify patient and also should follow up with neurology for head

## 2021-02-23 NOTE — TELEPHONE ENCOUNTER
Received call from the patient. He states that he requested refill for norco via [employer] an hour ago but also wanted to call because he is down to his last few pills.      LOV with Dr. Phu Montana 7/3/18  LOV with Dr. Elmer Vasquez 1/18/21  Last fill 2/16/21 by Dr. Phu Montana x

## 2021-03-01 RX ORDER — HYDROCODONE BITARTRATE AND ACETAMINOPHEN 10; 325 MG/1; MG/1
1 TABLET ORAL EVERY 6 HOURS PRN
Qty: 30 TABLET | Refills: 0 | Status: SHIPPED | OUTPATIENT
Start: 2021-03-01 | End: 2021-03-07

## 2021-03-01 RX ORDER — HYDROCODONE BITARTRATE AND ACETAMINOPHEN 10; 325 MG/1; MG/1
1 TABLET ORAL EVERY 6 HOURS PRN
Qty: 30 TABLET | Refills: 0 | OUTPATIENT
Start: 2021-03-01

## 2021-03-02 ENCOUNTER — TELEPHONE (OUTPATIENT)
Dept: NEUROLOGY | Facility: CLINIC | Age: 26
End: 2021-03-02

## 2021-03-02 ENCOUNTER — TELEPHONE (OUTPATIENT)
Dept: FAMILY MEDICINE CLINIC | Facility: CLINIC | Age: 26
End: 2021-03-02

## 2021-03-02 NOTE — TELEPHONE ENCOUNTER
Spoke to patient, request short term disability paperwork be filled out ASAP and sent to [facility]. I advised patient that he has not signed consent for this office to release information to [employer].  If Dr Lasha Suh completes form it may be mailed or faxed to [facility]

## 2021-03-02 NOTE — TELEPHONE ENCOUNTER
Form Completions dept: patient will be faxing additional forms to [phone]. He was unable to upload forms in [employer]. Patient needs additional disability forms filled out. Patient needs an extension from 2/16 to 3/16/21.       Has appt with Dr. Helen Nguyen

## 2021-03-02 NOTE — TELEPHONE ENCOUNTER
Message noted: Chart reviewed and may refill norco as requested times one. Script sent to listed pharmacy by [facility]. [place] No concerns noted. Please notify patient/ Pt notified via [address]

## 2021-03-02 NOTE — TELEPHONE ENCOUNTER
Amos rollins,  I will fill it out and fax it tonight. He can do a video visit. We need the results of his EEG from [facility].    Thanks,  [employer]

## 2021-03-02 NOTE — TELEPHONE ENCOUNTER
Patient states he has not had EEG done yet. I don`t believe he has scheduled it yet. [facility] per patient request. Is site closer to home. David Chung is scheduled for 3/12/21.    Patient to call office back with fax to send for

## 2021-03-04 NOTE — TELEPHONE ENCOUNTER
Spoke with pt,  verified. Pt wants to f/u on this form, he can't wait anymore. pls call pt.   pls advise, thanks in advance.

## 2021-03-05 ENCOUNTER — TELEPHONE (OUTPATIENT)
Dept: NEUROLOGY | Facility: CLINIC | Age: 26
End: 2021-03-05

## 2021-03-05 NOTE — TELEPHONE ENCOUNTER
Patient called upset as to why his paperwork was not completed and due to us not completing he was not getting paid. He said it was faxed to us on 3/2. He mentioned Dr Wm Klein should of completed and when I said he is not our doctor, he said Dr. Naye De Luna.  I expl

## 2021-03-05 NOTE — TELEPHONE ENCOUNTER
Patient will be faxing new [employer] forms. 1st day off 1/7/21 and estimated RTW date 4/2/21. Sent [facility] for HIPAA in case none with paperwork. Patient needs completed ASAP if possible.

## 2021-03-05 NOTE — TELEPHONE ENCOUNTER
Kandy Rodriguez from [facility] wants to know if  Is there an alternative diagnoses code for EEG-please advise

## 2021-03-05 NOTE — TELEPHONE ENCOUNTER
Spoke to [facility]. Insurance will not cover EEG with current diagnosis of loss of consciousness. She looked and they will cover R55 which is syncope and collapse. Will make sure with Dr. Khris Marcelo that it is okay to use this diagnosis.

## 2021-03-06 NOTE — TELEPHONE ENCOUNTER
EXAMINATION:  5/23/2017 8:01 AM    HISTORY/REASON FOR EXAM:  Ascites         TECHNIQUE/EXAM DESCRIPTION:  Ultrasound-guided paracentesis.    COMPARISON:  None    PROCEDURE:  Informed consent was obtained. A timeout was taken. Ascites was localized with real-time ultrasound. The left lower quadrant of the abdominal wall was prepared and draped in a sterile manner. Following local anesthesia with 1% lidocaine, a 5 Pashto Yueh pigtail catheter was advanced into the peritoneal cavity with trocar technique. Ascites was drained. The patient tolerated the procedure well with no evidence of complication.    FINDINGS:  Ascites is present. No blood loss.    Fluid was not sent to the laboratory.    Fluid character: straw colored    IMPRESSION:  1. Ultrasound-guided therapeutic paracentesis of the left lower quadrant of the abdominal wall.    2. 1800 mL of fluid withdrawn.   Yes ok w diagnosis

## 2021-03-08 ENCOUNTER — TELEPHONE (OUTPATIENT)
Dept: NEUROLOGY | Facility: CLINIC | Age: 26
End: 2021-03-08

## 2021-03-08 RX ORDER — HYDROCODONE BITARTRATE AND ACETAMINOPHEN 10; 325 MG/1; MG/1
1 TABLET ORAL EVERY 6 HOURS PRN
Qty: 30 TABLET | Refills: 0 | Status: SHIPPED | OUTPATIENT
Start: 2021-03-08 | End: 2021-03-13

## 2021-03-08 NOTE — TELEPHONE ENCOUNTER
Message noted: Chart reviewed and may refill norco as requested times one. Script sent to listed pharmacy by secure method. [place]. No concerns noted. Pt notified via [facility].

## 2021-03-08 NOTE — TELEPHONE ENCOUNTER
Pt is calling back in regards to paperwork being filled out.  Pt wants to make sure paperwork is faxed to [phone] and claim# [id number] is on paperwork

## 2021-03-08 NOTE — TELEPHONE ENCOUNTER
Called and spoke with patient, advised we are still waiting for paperwork to be completed. Advised that Francisco Mishra will be in office tomorrow so we will inform him about paperwork once he comes in.  Patient agreed with terms & is expecting follow up regardin

## 2021-03-08 NOTE — TELEPHONE ENCOUNTER
URGENTLY NEEDS PAPERWORK TAKEN CARE OF FOR INSURANCE AND WORK PURPOSES. THIS IS A MATTER OF HIM LOSING HIS JOB. PLS CONTACT PT ASAP.

## 2021-03-08 NOTE — TELEPHONE ENCOUNTER
Claim form in initiated, left in Dr Swapna Gomez mailbox for completion and signature. Per chart reviewphone encounter 3/2/21 patient is asking Dr Edward Gee for time off work from 1/7/21-estimate return to work of 4/2/21.

## 2021-03-10 NOTE — TELEPHONE ENCOUNTER
[employer] form completed and signed by Dr Candido Zamudio. Progress note and work note from 1/27/21 and [employer] form faxed. [employer] form to scanning.

## 2021-03-12 ENCOUNTER — TELEPHONE (OUTPATIENT)
Dept: NEUROLOGY | Facility: CLINIC | Age: 26
End: 2021-03-12

## 2021-03-12 ENCOUNTER — TELEMEDICINE (OUTPATIENT)
Dept: NEUROLOGY | Facility: CLINIC | Age: 26
End: 2021-03-12
Payer: COMMERCIAL

## 2021-03-12 DIAGNOSIS — R51.9 CHRONIC INTRACTABLE HEADACHE, UNSPECIFIED HEADACHE TYPE: Primary | ICD-10-CM

## 2021-03-12 DIAGNOSIS — G89.29 CHRONIC INTRACTABLE HEADACHE, UNSPECIFIED HEADACHE TYPE: Primary | ICD-10-CM

## 2021-03-12 DIAGNOSIS — R40.20 LOSS OF CONSCIOUSNESS (HCC): ICD-10-CM

## 2021-03-12 PROCEDURE — 99213 OFFICE O/P EST LOW 20 MIN: CPT | Performed by: OTHER

## 2021-03-12 RX ORDER — BUSPIRONE HYDROCHLORIDE 15 MG/1
15 TABLET ORAL EVERY MORNING
COMMUNITY
Start: 2021-03-04

## 2021-03-12 RX ORDER — ESCITALOPRAM OXALATE 10 MG/1
10 TABLET ORAL DAILY
COMMUNITY
Start: 2021-03-04 | End: 2021-11-29

## 2021-03-12 RX ORDER — DIVALPROEX SODIUM 250 MG/1
250 TABLET, EXTENDED RELEASE ORAL EVERY EVENING
Qty: 90 TABLET | Refills: 0 | Status: SHIPPED | OUTPATIENT
Start: 2021-03-12 | End: 2021-05-19 | Stop reason: DRUGHIGH

## 2021-03-12 RX ORDER — SUMATRIPTAN 100 MG/1
TABLET, FILM COATED ORAL
Qty: 9 TABLET | Refills: 0 | Status: SHIPPED | OUTPATIENT
Start: 2021-03-12 | End: 2021-03-12

## 2021-03-12 RX ORDER — ALPRAZOLAM 2 MG/1
2 TABLET ORAL 2 TIMES DAILY PRN
COMMUNITY
Start: 2021-02-24 | End: 2021-11-29

## 2021-03-12 NOTE — PROGRESS NOTES
I conducted a telehealth visit with Nataliia Hernandez today, 03/12/2021, which was completed using two-way, real-time interactive audio communication.  This has been done in good jose r to provide continuity of care in the best interest of the provider-patient rel and when he was called requested that we switch to a phone visit. Through the initial interview it became clear that the patient may have had a seizure leading to his TBI.   He has multiple factors and his history is that support that this event was a seiz mental status or level of consciousness, diplopia, abnormal cranial nerve function, pulsatile tinnitus, loss of sensation, weakness, ataxia, history of seizure/collapse/loss of consciousness: No  - [place]clap HA: No   - Older than 50; pt is currently 30 y a very pleasant 30year old  RH man w/ a pmhx of anxiety who presents for concussion/post TBI symptoms. The patient was supposed be seen in video visit today but instead did not answer and when he was called requested that we switch to a phone visit.   Thr

## 2021-03-13 RX ORDER — HYDROCODONE BITARTRATE AND ACETAMINOPHEN 10; 325 MG/1; MG/1
1 TABLET ORAL EVERY 6 HOURS PRN
Qty: 30 TABLET | Refills: 0 | OUTPATIENT
Start: 2021-03-13

## 2021-03-14 RX ORDER — HYDROCODONE BITARTRATE AND ACETAMINOPHEN 10; 325 MG/1; MG/1
1 TABLET ORAL EVERY 6 HOURS PRN
Qty: 30 TABLET | Refills: 0 | Status: SHIPPED | OUTPATIENT
Start: 2021-03-14 | End: 2021-03-21

## 2021-03-22 RX ORDER — HYDROCODONE BITARTRATE AND ACETAMINOPHEN 10; 325 MG/1; MG/1
1 TABLET ORAL EVERY 6 HOURS PRN
Qty: 30 TABLET | Refills: 0 | Status: SHIPPED | OUTPATIENT
Start: 2021-03-22 | End: 2021-03-30

## 2021-03-22 RX ORDER — HYDROCODONE BITARTRATE AND ACETAMINOPHEN 10; 325 MG/1; MG/1
1 TABLET ORAL EVERY 6 HOURS PRN
Qty: 30 TABLET | Refills: 0 | OUTPATIENT
Start: 2021-03-22

## 2021-03-22 NOTE — TELEPHONE ENCOUNTER
Message noted: Chart reviewed and may refill norco as requested times one. Script sent to listed pharmacy by secure method[place]. No concerns noted. Please notify patient/ Pt notified via [facility].

## 2021-03-28 RX ORDER — HYDROCODONE BITARTRATE AND ACETAMINOPHEN 10; 325 MG/1; MG/1
1 TABLET ORAL EVERY 6 HOURS PRN
Qty: 30 TABLET | Refills: 0 | OUTPATIENT
Start: 2021-03-28

## 2021-03-29 NOTE — TELEPHONE ENCOUNTER
Patient scheduled video visit for tomorrow with Dr Justus Marquez for follow up on medication. Reports concerns with medication that was started by his Neurologist, advised to contact their office directly for recommendations.

## 2021-03-30 ENCOUNTER — TELEMEDICINE (OUTPATIENT)
Dept: FAMILY MEDICINE CLINIC | Facility: CLINIC | Age: 26
End: 2021-03-30

## 2021-03-30 DIAGNOSIS — G89.29 CHRONIC NONINTRACTABLE HEADACHE, UNSPECIFIED HEADACHE TYPE: Primary | ICD-10-CM

## 2021-03-30 DIAGNOSIS — R51.9 CHRONIC NONINTRACTABLE HEADACHE, UNSPECIFIED HEADACHE TYPE: Primary | ICD-10-CM

## 2021-03-30 PROCEDURE — 99213 OFFICE O/P EST LOW 20 MIN: CPT | Performed by: FAMILY MEDICINE

## 2021-03-30 RX ORDER — HYDROCODONE BITARTRATE AND ACETAMINOPHEN 10; 325 MG/1; MG/1
1 TABLET ORAL EVERY 6 HOURS PRN
Qty: 30 TABLET | Refills: 0 | Status: SHIPPED | OUTPATIENT
Start: 2021-03-30 | End: 2021-04-05

## 2021-03-30 NOTE — PROGRESS NOTES
HPI/Subjective:   Patient ID: Ashley Delgado is a 30year old male. This visit is conducted using Telemedicine with live, interactive video and audio during this Coronavirus pandemic.     Please note that the following visit was completed using two-way, daniel HYDROcodone-acetaminophen (NORCO)  MG Oral Tab Take 1 tablet by mouth every 6 (six) hours as needed for Pain. Medication may causes sedation, constipation. Not to operate heavy machinery or drive on medication.  30 tablet 0   • alprazolam 2 MG Oral Ta real-time interactive audio and/or video communication.   This has been done in good jose r to provide continuity of care in the best interest of the provider-patient relationship, due to the ongoing public health crisis/national emergency and because of res

## 2021-04-05 RX ORDER — HYDROCODONE BITARTRATE AND ACETAMINOPHEN 10; 325 MG/1; MG/1
1 TABLET ORAL EVERY 6 HOURS PRN
Qty: 30 TABLET | Refills: 0 | Status: SHIPPED | OUTPATIENT
Start: 2021-04-05 | End: 2021-04-11

## 2021-04-05 NOTE — TELEPHONE ENCOUNTER
Message noted: Chart reviewed and may refill norco as requested times one. Script sent to listed pharmacy by secure method[place] No concerns noted. Please notify patient/ Pt notified via [facility].

## 2021-04-11 RX ORDER — HYDROCODONE BITARTRATE AND ACETAMINOPHEN 10; 325 MG/1; MG/1
1 TABLET ORAL EVERY 6 HOURS PRN
Qty: 30 TABLET | Refills: 0 | Status: SHIPPED | OUTPATIENT
Start: 2021-04-11 | End: 2021-04-18

## 2021-04-12 NOTE — TELEPHONE ENCOUNTER
Message noted: Chart reviewed and may refill norco as requested times one. Script sent to listed pharmacy by secure method[place] No concerns noted. Please notify patient/ Pt notified via [facility].

## 2021-04-14 ENCOUNTER — TELEPHONE (OUTPATIENT)
Dept: NEUROLOGY | Facility: CLINIC | Age: 26
End: 2021-04-14

## 2021-04-14 ENCOUNTER — PATIENT MESSAGE (OUTPATIENT)
Dept: NEUROLOGY | Facility: CLINIC | Age: 26
End: 2021-04-14

## 2021-04-15 ENCOUNTER — PATIENT MESSAGE (OUTPATIENT)
Dept: NEUROLOGY | Facility: CLINIC | Age: 26
End: 2021-04-15

## 2021-04-15 NOTE — TELEPHONE ENCOUNTER
Called patient back. Informed him the EEG was negative. Okay to return to work. Okay to drive. We will send note in my chart. Patient should schedule follow-up appointment for his headaches.   Reports that he is taking Depakote but now it is causing hi

## 2021-04-16 ENCOUNTER — PATIENT MESSAGE (OUTPATIENT)
Dept: NEUROLOGY | Facility: CLINIC | Age: 26
End: 2021-04-16

## 2021-04-16 NOTE — TELEPHONE ENCOUNTER
Per routing comment from Dr Job Ward would like to see in office to discuss headaches. Left message on patient voice mail to call office or use my chart to schedule NOV per direction from Dr Job Ward.  Advised forwarded request to extend time off work to Dr Dayla Duverney

## 2021-04-16 NOTE — TELEPHONE ENCOUNTER
From: Von Saldana  To: Melanie Osullivan DO  Sent: 4/15/2021 5:34 PM CDT  Subject: Test Results Question    Can you call me again please. I had a question for you I forgot to ask, regarding my migraines and dizziness.  Also what could've caused my seizures if m

## 2021-04-19 RX ORDER — HYDROCODONE BITARTRATE AND ACETAMINOPHEN 10; 325 MG/1; MG/1
1 TABLET ORAL EVERY 6 HOURS PRN
Qty: 30 TABLET | Refills: 0 | Status: SHIPPED | OUTPATIENT
Start: 2021-04-19 | End: 2021-04-25

## 2021-04-19 NOTE — TELEPHONE ENCOUNTER
Message noted: Chart reviewed and may refill norco as requested times one. Script sent to listed pharmacy by secure method[place] No concerns noted. Please notify patient/ Pt notified via [facility].

## 2021-04-22 NOTE — TELEPHONE ENCOUNTER
Background, Eric 4/20/2021 5:00 PM CDT    Yes I received it, I need a letter saying I can return on the 26th. It's so sudden, I'm still trying to find someone to watch my children while my spouse and I work on Mondays.  If I get cleared on the 15th and I

## 2021-04-23 NOTE — TELEPHONE ENCOUNTER
Called pt back. Asked that he not send messages in caps lock to the staff via [employer]. He had another syncope episode that this Dee Backers was not informed of. 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    Was under the impres

## 2021-04-24 RX ORDER — HYDROCODONE BITARTRATE AND ACETAMINOPHEN 10; 325 MG/1; MG/1
1 TABLET ORAL EVERY 6 HOURS PRN
Qty: 30 TABLET | Refills: 0 | OUTPATIENT
Start: 2021-04-24

## 2021-04-25 RX ORDER — HYDROCODONE BITARTRATE AND ACETAMINOPHEN 10; 325 MG/1; MG/1
1 TABLET ORAL EVERY 6 HOURS PRN
Qty: 30 TABLET | Refills: 0 | Status: SHIPPED | OUTPATIENT
Start: 2021-04-25 | End: 2021-05-01

## 2021-04-26 NOTE — TELEPHONE ENCOUNTER
Left detailed message for patient to return call to this office for [facility]. Advised Dr Leopoldo Bruns has opening May 5 or May 7.

## 2021-05-03 RX ORDER — HYDROCODONE BITARTRATE AND ACETAMINOPHEN 10; 325 MG/1; MG/1
1 TABLET ORAL EVERY 6 HOURS PRN
Qty: 30 TABLET | Refills: 0 | Status: SHIPPED | OUTPATIENT
Start: 2021-05-03 | End: 2021-05-08

## 2021-05-03 RX ORDER — HYDROCODONE BITARTRATE AND ACETAMINOPHEN 10; 325 MG/1; MG/1
1 TABLET ORAL EVERY 6 HOURS PRN
Qty: 30 TABLET | Refills: 0 | OUTPATIENT
Start: 2021-05-03

## 2021-05-03 NOTE — TELEPHONE ENCOUNTER
Message noted: Chart reviewed and may refill norco as requested times one. Script sent to listed pharmacy by secure method[place] No concerns noted. Pt notified via [facility].

## 2021-05-05 RX ORDER — HYDROCODONE BITARTRATE AND ACETAMINOPHEN 10; 325 MG/1; MG/1
1 TABLET ORAL EVERY 6 HOURS PRN
Qty: 30 TABLET | Refills: 0 | OUTPATIENT
Start: 2021-05-05

## 2021-05-08 RX ORDER — HYDROCODONE BITARTRATE AND ACETAMINOPHEN 10; 325 MG/1; MG/1
1 TABLET ORAL EVERY 6 HOURS PRN
Qty: 30 TABLET | Refills: 0 | Status: CANCELLED | OUTPATIENT
Start: 2021-05-08

## 2021-05-09 RX ORDER — HYDROCODONE BITARTRATE AND ACETAMINOPHEN 10; 325 MG/1; MG/1
1 TABLET ORAL EVERY 6 HOURS PRN
Qty: 30 TABLET | Refills: 0 | OUTPATIENT
Start: 2021-05-09

## 2021-05-09 RX ORDER — HYDROCODONE BITARTRATE AND ACETAMINOPHEN 10; 325 MG/1; MG/1
1 TABLET ORAL EVERY 6 HOURS PRN
Qty: 30 TABLET | Refills: 0 | Status: SHIPPED | OUTPATIENT
Start: 2021-05-09 | End: 2021-05-15

## 2021-05-09 NOTE — TELEPHONE ENCOUNTER
Message noted: Chart reviewed and may refill medication as requested. Script sent to listed pharmacy by secure method.     Pt notified through [facility]

## 2021-05-15 RX ORDER — NAPROXEN 500 MG/1
500 TABLET ORAL 2 TIMES DAILY WITH MEALS
Qty: 30 TABLET | Refills: 0 | Status: SHIPPED | OUTPATIENT
Start: 2021-05-15 | End: 2021-11-29

## 2021-05-15 RX ORDER — HYDROCODONE BITARTRATE AND ACETAMINOPHEN 10; 325 MG/1; MG/1
1 TABLET ORAL EVERY 6 HOURS PRN
Qty: 10 TABLET | Refills: 0 | Status: SHIPPED | OUTPATIENT
Start: 2021-05-15 | End: 2021-05-20

## 2021-05-15 NOTE — TELEPHONE ENCOUNTER
Message noted: Chart reviewed and may refill norco as requested #10. Pt contacted as was contacted that pt is seeing a psychiatrist for depression. Pt is on lexapro. Discussed treatment for his headaches and pt oviedo have appt with neurologist next week.  Gelacio

## 2021-05-19 NOTE — PATIENT INSTRUCTIONS
Both episodes when you lost consciousness are classic for seizures. You bit your tongue/had other injuries, you were confused after, and you had missing time.  The fact that you had a febrile seizure in infancy and your sister has sisters puts you at high r falling onto harmful objects. Move hard or sharp objects out of the way. 2. Don't force anything into the person's mouth or try to open clenched jaws. Turn the person on his or her side when the violent movement stops or if he or she is vomiting. 3.  Wh minutes or even a couple of hours. They may also complain of headache, nausea and may vomit. Please remember:   Call your doctor if you feel that the severity or number of seizures has changed from baseline.    If you have several grand mal seizures witho

## 2021-05-19 NOTE — PROGRESS NOTES
[facility]  [address] [facility] [address] [place] [address]  [phone]        Neurology Clinic Follow Up Note    Chief Complaint:  Headache (LOV 1/27/21. C/o headaches when wakes up every day.  C/o sharp pain beheind right eye on for me. \"  He \"woke up standing\" his daughter was starring at him. States \"I am almost amnesic. I know my name. I know my children. I forget where I am. It Is like you took a really hard hit to the head. \"     Patient is going through significant emoti HCl (BUSPAR) 30 mg, Oral, Nightly, TAKE AT BEDTIME   • busPIRone HCl (BUSPAR) 15 mg, Oral, Every morning   • BusPIRone HCl 10 MG Oral Tab Oral, 2 times daily, 15 mg in am and 30 mg in pm   • divalproex Sodium ER (DEPAKOTE) 250 mg, Oral, Every evening   • e interosseous wasting. No flattening of hypothenar eminences. Motor Strength    Pronator drift: No pronator drift   Arm Rolling: No orbiting. Finger Taps: Finger taps are symmetric in rate and amplitude.     Rapid movements:   Leg Drift: None   R     L work.     Plan   1. Generalized epilepsy with a history of febrile seizures  Diagnostics:  1. None at this time. May need a MRI with epilepsy protocol  Therapeutics  - levetiracetam 500 MG Oral Tab;  Take 1 tablet (500 mg total) by mouth 2 (two) times vitaly the completion of the note. I spent  30 minutes counseling the patient regarding the findings of investigations, treatment options, risks and benefits of treatment, and management of care regarding the diagnosis above.   The total face-to-face physicia

## 2021-05-20 ENCOUNTER — TELEPHONE (OUTPATIENT)
Dept: FAMILY MEDICINE CLINIC | Facility: CLINIC | Age: 26
End: 2021-05-20

## 2021-05-20 ENCOUNTER — TELEPHONE (OUTPATIENT)
Dept: NEUROLOGY | Facility: CLINIC | Age: 26
End: 2021-05-20

## 2021-05-20 RX ORDER — HYDROCODONE BITARTRATE AND ACETAMINOPHEN 10; 325 MG/1; MG/1
1 TABLET ORAL EVERY 6 HOURS PRN
Qty: 30 TABLET | Refills: 0 | Status: SHIPPED | OUTPATIENT
Start: 2021-05-20 | End: 2021-05-24

## 2021-05-20 NOTE — TELEPHONE ENCOUNTER
Message noted: Chart reviewed and may refill norco as requested times one. Please notify patient to discuss chronic narcotic medication use.  Can schedule virtual visit

## 2021-05-20 NOTE — TELEPHONE ENCOUNTER
He went to the neurologist yesterday and placed on        levETIRAcetam 500 mg Oral 2 times daily     predniSONE 20 MG Take 3 tablets (60 mg total) by mouth daily for 4 days, THEN 2 tablets (40 mg total) daily for 4 days, THEN 1 tablet (20 mg total) daily

## 2021-05-20 NOTE — TELEPHONE ENCOUNTER
Patient seen in office on 5/19/21. Received [employer] forms to office. Per Dr. Ozzie Cramer, patient to be off work for one month. He was advised to follow-up in one month. Forms filled out appropriately. Will have Dr. Ozzie Cramer review and sign before faxing.

## 2021-05-21 NOTE — TELEPHONE ENCOUNTER
Pt was called and informed of Dr. Cash Pean message below pt verbalized understanding. And he did receive the message from the pharmacy that his medication was ready for .

## 2021-05-21 NOTE — TELEPHONE ENCOUNTER
Patient returning our call, ready to schedule visit with Dr. Gunner Coyle. Per Dr. Gunner Coyle ok to schedule virtual visit. Appointment made for Monday.   Future Appointments   Date Time Provider Robin Atkins   5/24/2021  2:20 PM Mariangel Sanz MD [facility]

## 2021-05-21 NOTE — TELEPHONE ENCOUNTER
[employer] forms were filled out yesterday and signed by Dr. Jihan Walker. Forms faxed to [facility] today. Spoke to patient and notified him that they would be faxed today. He was understanding.

## 2021-05-24 NOTE — PROGRESS NOTES
HPI/Subjective:   Patient ID: Carlo Cano is a 30year old male. Patient is here for follow up for chronic medical issues- post concussion syndrome/ migraines. The patient is compliant with medications and no side effects.  There are no acute issues and morning. • hydrOXYzine HCl 25 MG Oral Tab Take 25 mg by mouth every 6 (six) hours as needed. • ibuprofen 800 MG Oral Tab Take 1 tablet (800 mg total) by mouth 3 (three) times daily.  270 tablet 3   • BusPIRone HCl 10 MG Oral Tab Take by mouth 2 (t

## 2021-05-31 ENCOUNTER — MOBILE ENCOUNTER (OUTPATIENT)
Dept: NEUROLOGY | Facility: CLINIC | Age: 26
End: 2021-05-31

## 2021-05-31 NOTE — PROGRESS NOTES
Brief neurology pn    Informed Nicolas Clayton that pt was upset and his paperwork had not been signed for a month. Called pt back  Informed him i signed the disability paperwork the week of his appointment and was unaware there were any delays.   Rudy tapia

## 2021-06-01 RX ORDER — HYDROCODONE BITARTRATE AND ACETAMINOPHEN 10; 325 MG/1; MG/1
1 TABLET ORAL EVERY 6 HOURS PRN
Qty: 30 TABLET | Refills: 0 | Status: SHIPPED | OUTPATIENT
Start: 2021-06-01 | End: 2021-06-06

## 2021-06-01 RX ORDER — HYDROCODONE BITARTRATE AND ACETAMINOPHEN 10; 325 MG/1; MG/1
1 TABLET ORAL EVERY 6 HOURS PRN
Qty: 30 TABLET | Refills: 0 | OUTPATIENT
Start: 2021-06-01

## 2021-06-01 NOTE — TELEPHONE ENCOUNTER
Message noted: Chart reviewed and may refill medication as requested. Script sent to listed pharmacy by secure method.     Pt notified through [address]

## 2021-06-07 ENCOUNTER — PATIENT MESSAGE (OUTPATIENT)
Dept: NEUROLOGY | Facility: CLINIC | Age: 26
End: 2021-06-07

## 2021-06-07 RX ORDER — HYDROCODONE BITARTRATE AND ACETAMINOPHEN 10; 325 MG/1; MG/1
1 TABLET ORAL EVERY 6 HOURS PRN
Qty: 30 TABLET | Refills: 0 | Status: SHIPPED | OUTPATIENT
Start: 2021-06-07 | End: 2021-06-10

## 2021-06-07 NOTE — TELEPHONE ENCOUNTER
From: Zander Laughlin  To: Renay Mckenzie,   Sent: 6/7/2021 2:30 PM CDT  Subject: Other    Urgent! Disability ([employer]) needs our visit notes from our last appointment and results on the EEG and MRI. Please, please send those in ASAP.      Thank you,    Cherylene Finders

## 2021-06-10 RX ORDER — HYDROCODONE BITARTRATE AND ACETAMINOPHEN 10; 325 MG/1; MG/1
1 TABLET ORAL EVERY 6 HOURS PRN
Qty: 30 TABLET | Refills: 0 | Status: SHIPPED | OUTPATIENT
Start: 2021-06-10 | End: 2021-06-18

## 2021-06-10 NOTE — TELEPHONE ENCOUNTER
Spoke with patient ( verified)--requesting Hydrocodone refill. \"I know it's a little early, but I need my refill by . I am currently not driving-my neurologist doesn't want me to drive yet and my girlfriend works nights.  If I wait until

## 2021-06-19 ENCOUNTER — TELEPHONE (OUTPATIENT)
Dept: FAMILY MEDICINE CLINIC | Facility: CLINIC | Age: 26
End: 2021-06-19

## 2021-06-19 ENCOUNTER — TELEPHONE (OUTPATIENT)
Dept: NEUROLOGY | Facility: CLINIC | Age: 26
End: 2021-06-19

## 2021-06-19 RX ORDER — HYDROCODONE BITARTRATE AND ACETAMINOPHEN 10; 325 MG/1; MG/1
1 TABLET ORAL EVERY 6 HOURS PRN
Qty: 30 TABLET | Refills: 0 | Status: SHIPPED | OUTPATIENT
Start: 2021-06-19 | End: 2021-06-24

## 2021-06-19 NOTE — TELEPHONE ENCOUNTER
Message noted: Chart reviewed and may refill medication as requested. Script sent to listed pharmacy by secure method.     Pt notified through [facility]

## 2021-06-19 NOTE — TELEPHONE ENCOUNTER
Called patient back regarding his [employer] message. Patient's symptoms have become worse after his girlfriend's recent suicide attempt. He still is having headaches. Advised him that I can give him off until 6/25/2021.   However, if he needs more time off

## 2021-06-19 NOTE — TELEPHONE ENCOUNTER
Pt called asking if Dr. Naye De Luna was in office today, call disconnected while talking with pt.  Left message to call back, transfer to triage

## 2021-06-19 NOTE — TELEPHONE ENCOUNTER
Patient sent [employer] message yesterday asking if prescription for Dav Cage had been sent to pharmacy. RN sent message today to reply that Dav Cage has been sent to his pharmacy today. Left detailed message per GÓMEZ.

## 2021-06-21 RX ORDER — HYDROCODONE BITARTRATE AND ACETAMINOPHEN 10; 325 MG/1; MG/1
1 TABLET ORAL EVERY 6 HOURS PRN
Qty: 30 TABLET | Refills: 0 | OUTPATIENT
Start: 2021-06-21

## 2021-06-24 ENCOUNTER — TELEPHONE (OUTPATIENT)
Dept: FAMILY MEDICINE CLINIC | Facility: CLINIC | Age: 26
End: 2021-06-24

## 2021-06-24 RX ORDER — HYDROCODONE BITARTRATE AND ACETAMINOPHEN 10; 325 MG/1; MG/1
1 TABLET ORAL EVERY 6 HOURS PRN
Qty: 30 TABLET | Refills: 0 | Status: SHIPPED | OUTPATIENT
Start: 2021-06-24 | End: 2021-06-30

## 2021-06-24 NOTE — TELEPHONE ENCOUNTER
Message noted: Chart reviewed and may refill norco as requested times one. Script sent to listed pharmacy by secure method. [place] No concerns noted.  Please notify patient and pt to contact me or follow up to discuss danyell

## 2021-06-30 RX ORDER — HYDROCODONE BITARTRATE AND ACETAMINOPHEN 10; 325 MG/1; MG/1
1 TABLET ORAL EVERY 6 HOURS PRN
Qty: 30 TABLET | Refills: 0 | Status: SHIPPED | OUTPATIENT
Start: 2021-06-30 | End: 2021-07-06

## 2021-06-30 RX ORDER — HYDROCODONE BITARTRATE AND ACETAMINOPHEN 10; 325 MG/1; MG/1
1 TABLET ORAL EVERY 6 HOURS PRN
Qty: 30 TABLET | Refills: 0 | OUTPATIENT
Start: 2021-06-30

## 2021-06-30 NOTE — TELEPHONE ENCOUNTER
Pt calling for RX pending, stated has 3 pills left, , taking for h/a -has 3 pills left from 6/24/21 previous refill

## 2021-07-03 ENCOUNTER — PATIENT MESSAGE (OUTPATIENT)
Dept: NEUROLOGY | Facility: CLINIC | Age: 26
End: 2021-07-03

## 2021-07-06 ENCOUNTER — PATIENT MESSAGE (OUTPATIENT)
Dept: FAMILY MEDICINE CLINIC | Facility: CLINIC | Age: 26
End: 2021-07-06

## 2021-07-06 NOTE — TELEPHONE ENCOUNTER
From: Lisandro Tomas  To: Manish Amezcua DO  Sent: 7/3/2021 7:03 AM CDT  Subject: Other    Dr Emily Corrigna can you please give me a call. I had another episode.     Thank you  Shawn Wheatley

## 2021-07-06 NOTE — TELEPHONE ENCOUNTER
Spoke to Dr Linnea Cardozo, relayed information from patient. Advised waiting for patient to return call to office.

## 2021-07-07 ENCOUNTER — NURSE TRIAGE (OUTPATIENT)
Dept: FAMILY MEDICINE CLINIC | Facility: CLINIC | Age: 26
End: 2021-07-07

## 2021-07-07 ENCOUNTER — TELEPHONE (OUTPATIENT)
Dept: NEUROLOGY | Facility: CLINIC | Age: 26
End: 2021-07-07

## 2021-07-07 RX ORDER — HYDROCODONE BITARTRATE AND ACETAMINOPHEN 10; 325 MG/1; MG/1
1 TABLET ORAL EVERY 6 HOURS PRN
Qty: 30 TABLET | Refills: 0 | Status: CANCELLED | OUTPATIENT
Start: 2021-07-07

## 2021-07-07 RX ORDER — HYDROCODONE BITARTRATE AND ACETAMINOPHEN 10; 325 MG/1; MG/1
1 TABLET ORAL EVERY 6 HOURS PRN
Qty: 30 TABLET | Refills: 0 | Status: SHIPPED | OUTPATIENT
Start: 2021-07-07 | End: 2021-07-11

## 2021-07-07 NOTE — TELEPHONE ENCOUNTER
I would like to see his Keppra level. If he would like to be on a different medication he needs to set up a phone visit or a video visit.    Sarah Apple, DO  Staff Vascular [facility] General Neurology

## 2021-07-07 NOTE — TELEPHONE ENCOUNTER
RN pls call pt and triage or offer ov if needed, thanks.          From: Zander Laughlin  To: Demetrio Zee MD  Sent: 7/6/2021  7:54 PM CDT  Subject: Non-Urgent Eudelia Fothergill Dr. Josué Po,    I have this planters wart on my foot and it's been only annoying

## 2021-07-07 NOTE — TELEPHONE ENCOUNTER
Patient requesting refill on Norco. Medication pended for your review and approval.    Future Appointments   Date Time Provider Robin Atkins   7/8/2021  2:20 PM Cassidy Bass MD [facility] LANDY Gibbs

## 2021-07-07 NOTE — TELEPHONE ENCOUNTER
From: Tennis Gun  To: Spencer Goodpasture, MD  Sent: 7/6/2021 7:54 PM CDT  Subject: Non-Urgent Antonina  Dr. Eugenia Huffman,    I have this planters wart on my foot and it's been only annoying until now. But as of right now it really hurts to walk.  Are there

## 2021-07-07 NOTE — TELEPHONE ENCOUNTER
pt called and need to get kempra levels checked please fax order to [phone] or [phone]. please call if you have any questions and when done.

## 2021-07-07 NOTE — TELEPHONE ENCOUNTER
Spoke to patient. States he has broken up with girlfriend, children have been taken away, and he was homeless for 1 week. Mother has told him he is more irritable and combative since being on keppra and would like to try a different medication if possible.

## 2021-07-07 NOTE — TELEPHONE ENCOUNTER
Action Requested: Summary for Provider     []  Critical Lab, Recommendations Needed  [] Need Additional Advice  []   FYI    []   Need Orders  [] Need Medications Sent to Pharmacy  []  Other     SUMMARY: Per protocol: OV today or tomorrow.      Future Appoin

## 2021-07-08 ENCOUNTER — TELEPHONE (OUTPATIENT)
Dept: FAMILY MEDICINE CLINIC | Facility: CLINIC | Age: 26
End: 2021-07-08

## 2021-07-08 NOTE — TELEPHONE ENCOUNTER
Pt can't make appt today with Dr Yuniel Neely due to previous engagement. Re-scheduled appt with Dr Yuniel Neely on 7/10/21 @ 11:50am at [facility].      Tasked to Dr Yuniel Neely as update  Please reply to pool: DAVID Gallegos

## 2021-07-08 NOTE — TELEPHONE ENCOUNTER
Spoke to patient. He wanted to know if [facility] lab was faxed. Explained that it was faxed when he spoke with Jen Gregory [facility]. He was understanding.

## 2021-07-10 ENCOUNTER — TELEMEDICINE (OUTPATIENT)
Dept: FAMILY MEDICINE CLINIC | Facility: CLINIC | Age: 26
End: 2021-07-10

## 2021-07-10 DIAGNOSIS — G89.29 CHRONIC NONINTRACTABLE HEADACHE, UNSPECIFIED HEADACHE TYPE: ICD-10-CM

## 2021-07-10 DIAGNOSIS — B07.0 PLANTAR WART: Primary | ICD-10-CM

## 2021-07-10 DIAGNOSIS — R51.9 CHRONIC NONINTRACTABLE HEADACHE, UNSPECIFIED HEADACHE TYPE: ICD-10-CM

## 2021-07-10 PROCEDURE — 99213 OFFICE O/P EST LOW 20 MIN: CPT | Performed by: FAMILY MEDICINE

## 2021-07-10 NOTE — PROGRESS NOTES
HPI/Subjective:   Patient ID: Mak Le is a 30year old male. This visit is conducted using Telemedicine with live, interactive video and audio during this Coronavirus pandemic.     Please note that the following visit was completed using two-way, daniel not nervous/anxious. Current Outpatient Medications   Medication Sig Dispense Refill   • HYDROcodone-acetaminophen (NORCO)  MG Oral Tab Take 1 tablet by mouth every 6 (six) hours as needed for Pain. Medication may causes sedation, constipation. Stable, continue present management and follow up with neurology. Encouraged pt to limit norco use and eventually wean off of medication when better. No orders of the defined types were placed in this encounter.       Meds This Visit:  Requested Miles Hillman

## 2021-07-12 ENCOUNTER — TELEPHONE (OUTPATIENT)
Dept: NEUROLOGY | Facility: CLINIC | Age: 26
End: 2021-07-12

## 2021-07-12 ENCOUNTER — TELEMEDICINE (OUTPATIENT)
Dept: NEUROLOGY | Facility: CLINIC | Age: 26
End: 2021-07-12

## 2021-07-12 DIAGNOSIS — G40.802 OTHER EPILEPSY WITHOUT STATUS EPILEPTICUS, NOT INTRACTABLE (HCC): Primary | ICD-10-CM

## 2021-07-12 PROCEDURE — 99214 OFFICE O/P EST MOD 30 MIN: CPT | Performed by: OTHER

## 2021-07-12 RX ORDER — HYDROCODONE BITARTRATE AND ACETAMINOPHEN 10; 325 MG/1; MG/1
1 TABLET ORAL EVERY 6 HOURS PRN
Qty: 30 TABLET | Refills: 0 | OUTPATIENT
Start: 2021-07-12

## 2021-07-12 RX ORDER — HYDROCODONE BITARTRATE AND ACETAMINOPHEN 10; 325 MG/1; MG/1
1 TABLET ORAL EVERY 6 HOURS PRN
Qty: 30 TABLET | Refills: 0 | Status: SHIPPED | OUTPATIENT
Start: 2021-07-12 | End: 2021-07-18

## 2021-07-12 RX ORDER — PYRIDOXINE HCL (VITAMIN B6) 100 MG
100 TABLET ORAL DAILY
Qty: 90 TABLET | Refills: 1 | Status: SHIPPED | OUTPATIENT
Start: 2021-07-12 | End: 2022-01-08

## 2021-07-12 NOTE — TELEPHONE ENCOUNTER
Message noted: Chart reviewed and may refill norco as requested times one. Script sent to listed pharmacy by secure method. [place] No concerns noted. Pt notified via [facility].

## 2021-07-12 NOTE — TELEPHONE ENCOUNTER
Patient had televisit with Dr Lena Smith today. Per Dr Lena Smith wants EEG order written today faxed to site patient previously had EEG done.    Per [facility] review previous EEG order from earlier this years sent to [address] [place] [address] at F  and to [facility] at F

## 2021-07-12 NOTE — PROGRESS NOTES
[facility] 37  [address] [facility], [address] [place] [address]  [phone]        Neurology Video Visit Follow Up Note    I conducted a telehealth visit with Farhana Lipscomb today, 07/12/21, which was completed using two-way, real-time i for seizure. His 1st event was on 1/17/2021. He states his taxes girlfriend at 1:17 AM and then awoke \"in a puddle of blood\" with injuries to his face, laceration on the lateral aspect of his tongue from a bite and amnesia/confusion.   He initially felicia on 7/3/2021. . At 4 AM he awoke foggy, confused, and bit his tongue. Does not know if he was having an episode and fell asleep. Bit the side of his tongue.     Mom thinks [address] makes him overly confrontational   HE still has not gotten his level check lose his home. Unfortunately, his father   suddenly and unexpectedly in late 2021. We discussed antiepileptic drugs. His little sister is on Keppra.   He is aware of the adverse effects, including the increased risk for suicidality, possibili tablet, Rfl: 0  •  alprazolam 2 MG Oral Tab, Take 2 mg by mouth 2 (two) times daily as needed for Anxiety.  1 tablet BID PRN for anxiety, Disp: , Rfl:   •  busPIRone HCl 15 MG Oral Tab, Take 15 mg by mouth every morning., Disp: , Rfl:   •  escitalopram 10 M home.    He reports being more irritable while on Keppra. His headaches have been intractable, and did not improve with Depakote. He is on prescribed opioids for his headaches.     Unfortunately, he is also had multiple social stressors, including the graciela consider. If his level keppra is therapeutic he may need to be on another antidepressant. 2.posttraumtic headaches  Diagnostics:  •  none  Therapeutics  · Lifestyle changes:   · Maintain good sleep hygiene.   Patient should go to bed and wake up at the

## 2021-07-15 NOTE — TELEPHONE ENCOUNTER
Received EEG medical necessity form from [place]. Form filled out, signed by Dr. Reynold Noriega and faxed back with insurance and demographics information.

## 2021-07-16 ENCOUNTER — TELEPHONE (OUTPATIENT)
Dept: NEUROLOGY | Facility: CLINIC | Age: 26
End: 2021-07-16

## 2021-07-16 DIAGNOSIS — G40.802 OTHER EPILEPSY WITHOUT STATUS EPILEPTICUS, NOT INTRACTABLE (HCC): Primary | ICD-10-CM

## 2021-07-16 PROBLEM — G40.909 EPILEPSY (HCC): Status: ACTIVE | Noted: 2021-07-16

## 2021-07-16 NOTE — TELEPHONE ENCOUNTER
Paged by the answering service at 3:44 PM with the following message: \"Re; pt called angry insiting to page DR to get a call back to fill disability papers. (Your PT)\"    Patient was calm and appropriate during phone call.   Stated that he had reached ou

## 2021-07-19 RX ORDER — HYDROCODONE BITARTRATE AND ACETAMINOPHEN 10; 325 MG/1; MG/1
1 TABLET ORAL EVERY 6 HOURS PRN
Qty: 30 TABLET | Refills: 0 | Status: SHIPPED | OUTPATIENT
Start: 2021-07-19 | End: 2021-07-24

## 2021-07-19 NOTE — TELEPHONE ENCOUNTER
Message noted: Chart reviewed and may refill medication as requested. Script sent to listed pharmacy by secure method.     Pt notified through [facility]

## 2021-07-19 NOTE — TELEPHONE ENCOUNTER
Office note from 7/12/21, EEG order, and work letter printed and faxed to [employer] at [phone].

## 2021-07-24 ENCOUNTER — PATIENT MESSAGE (OUTPATIENT)
Dept: FAMILY MEDICINE CLINIC | Facility: CLINIC | Age: 26
End: 2021-07-24

## 2021-07-24 RX ORDER — HYDROCODONE BITARTRATE AND ACETAMINOPHEN 10; 325 MG/1; MG/1
1 TABLET ORAL EVERY 6 HOURS PRN
Qty: 30 TABLET | Refills: 0 | OUTPATIENT
Start: 2021-07-24

## 2021-07-24 NOTE — TELEPHONE ENCOUNTER
DR Emily Naylor (on behalf of  DR Phillips)=see patient's message, pended new script for norco.Office is already closed.     From: Madison Ramirez  To: Ingrid Cisneros MD  Sent: 7/24/2021 12:30 PM CDT  Subject: Prescription Elizabet Avila,    I had sent over a requ

## 2021-07-24 NOTE — TELEPHONE ENCOUNTER
Duplicate request, previously addressed. Request refused. Dr. Eron Burrell sent in new prescription on 7/19/21. RN will send [employer] Message to Patient to inform.

## 2021-07-25 NOTE — TELEPHONE ENCOUNTER
Please review. Protocol Failed or has No Protocol. Requested Prescriptions   Pending Prescriptions Disp Refills    HYDROcodone-acetaminophen (NORCO)  MG Oral Tab 30 tablet 0     Sig: Take 1 tablet by mouth every 6 (six) hours as needed for Pain.  Tamara Sauceda

## 2021-07-25 NOTE — TELEPHONE ENCOUNTER
It was just sent July 19, 2021 and it states that [facility] did receive it. I do not feel comfortable writing for it. He would just have to wait for Monday when Dr. John Nunez gets in.

## 2021-07-26 RX ORDER — HYDROCODONE BITARTRATE AND ACETAMINOPHEN 10; 325 MG/1; MG/1
1 TABLET ORAL EVERY 6 HOURS PRN
Qty: 30 TABLET | Refills: 0 | Status: SHIPPED | OUTPATIENT
Start: 2021-07-26 | End: 2021-07-30

## 2021-07-26 NOTE — TELEPHONE ENCOUNTER
Message noted: Chart reviewed and may refill norco as requested times one. Script sent to listed pharmacy by secure method. [place] No concerns noted. Pt notified via [facility].

## 2021-07-27 ENCOUNTER — TELEPHONE (OUTPATIENT)
Dept: NEUROLOGY | Facility: CLINIC | Age: 26
End: 2021-07-27

## 2021-07-30 NOTE — TELEPHONE ENCOUNTER
Protocol failed or has No Protocol, please review  Requested Prescriptions   Pending Prescriptions Disp Refills    HYDROcodone-acetaminophen (NORCO)  MG Oral Tab 30 tablet 0     Sig: Take 1 tablet by mouth every 6 (six) hours as needed for Pain.  Medi

## 2021-07-30 NOTE — TELEPHONE ENCOUNTER
Protocol failed or has No Protocol, please review  Requested Prescriptions   Pending Prescriptions Disp Refills    HYDROcodone-acetaminophen (NORCO)  MG Oral Tab 30 tablet 0     Sig: Take 1 tablet by mouth every 6 (six) hours as needed for Pain. Medication may causes sedation, constipation. Not to operate heavy machinery or drive on medication.         There is no refill protocol information for this order           Recent Outpatient Visits              2 weeks ago Other epilepsy without status epilepticus, not intractable [facility])    [facility], [address], [address], DO Shan    Telemedicine    2 weeks ago [address], [address] MD    Telemedicine    2 months ago Post concussion syndrome    [facility], [facility], [address] Mccord MD    Telemedicine    2 months ago [address] [place], Shan, DO    Office Visit    4 months ago Chronic nonintractable headache, unspecified headache type    Yesenia Gudino MD    Telemedicine

## 2021-07-30 NOTE — TELEPHONE ENCOUNTER
Patient called and asked if this can be filled today because he only has a ride for today and wont have another ride until Wednesday

## 2021-07-31 RX ORDER — HYDROCODONE BITARTRATE AND ACETAMINOPHEN 10; 325 MG/1; MG/1
1 TABLET ORAL EVERY 6 HOURS PRN
Qty: 30 TABLET | Refills: 0 | Status: SHIPPED | OUTPATIENT
Start: 2021-07-31 | End: 2021-08-07

## 2021-07-31 RX ORDER — HYDROCODONE BITARTRATE AND ACETAMINOPHEN 10; 325 MG/1; MG/1
1 TABLET ORAL EVERY 6 HOURS PRN
Qty: 30 TABLET | Refills: 0 | OUTPATIENT
Start: 2021-07-31

## 2021-07-31 NOTE — TELEPHONE ENCOUNTER
Message noted: Chart reviewed and may refill medication as requested. Script sent to listed pharmacy by secure method.     Pt notified through [facility]

## 2021-08-06 RX ORDER — HYDROCODONE BITARTRATE AND ACETAMINOPHEN 10; 325 MG/1; MG/1
1 TABLET ORAL EVERY 6 HOURS PRN
Qty: 30 TABLET | Refills: 0 | OUTPATIENT
Start: 2021-08-06

## 2021-08-06 RX ORDER — HYDROCODONE BITARTRATE AND ACETAMINOPHEN 10; 325 MG/1; MG/1
1 TABLET ORAL EVERY 6 HOURS PRN
Qty: 30 TABLET | Refills: 0 | Status: CANCELLED | OUTPATIENT
Start: 2021-08-06

## 2021-08-06 NOTE — TELEPHONE ENCOUNTER
Pt calling to request a refill on his [place], last [address] sent 7/31/21 and pt states it's a 7 day supply. He states he does take it around the clock for his migraines. Please advise. Routed to Dr Osmar Guillermo for Dr Marcos Guaman (out of office).

## 2021-08-06 NOTE — TELEPHONE ENCOUNTER
Chart reviewed. Patient picked up rx for [place] on 7/26. Too soon for refill. Left message of above information for patient.

## 2021-08-06 NOTE — TELEPHONE ENCOUNTER
Dr Janette Doan please advise.       Abhilash Mueller MD  You 3 hours ago (12:51 PM)     Should wait for dr Justyn Mendez text

## 2021-08-07 ENCOUNTER — PATIENT MESSAGE (OUTPATIENT)
Dept: FAMILY MEDICINE CLINIC | Facility: CLINIC | Age: 26
End: 2021-08-07

## 2021-08-07 RX ORDER — HYDROCODONE BITARTRATE AND ACETAMINOPHEN 10; 325 MG/1; MG/1
1 TABLET ORAL EVERY 6 HOURS PRN
Qty: 10 TABLET | Refills: 0 | Status: SHIPPED | OUTPATIENT
Start: 2021-08-07 | End: 2021-08-09

## 2021-08-07 RX ORDER — HYDROCODONE BITARTRATE AND ACETAMINOPHEN 10; 325 MG/1; MG/1
1 TABLET ORAL EVERY 6 HOURS PRN
Qty: 30 TABLET | Refills: 0 | OUTPATIENT
Start: 2021-08-07

## 2021-08-07 NOTE — TELEPHONE ENCOUNTER
This encounter closed, see other Mychart message 8/5/21. There were 3 Mychart request regarding this medication.

## 2021-08-07 NOTE — TELEPHONE ENCOUNTER
Patient appears to be taking this on a very regular basis and would need to discuss pain management with PCP.   He appears he seems to get 30 pills every week  I will refill till  Monday

## 2021-08-07 NOTE — TELEPHONE ENCOUNTER
Patient indicated that he needs a refill on the norco out of medication and can not wait till Monday 8/9/2021. Rx pended. Please advise.

## 2021-08-07 NOTE — TELEPHONE ENCOUNTER
DR Phillips=pended medication, multiple mychart messages for refill request today, will keep this encounter and closed the other encounters.

## 2021-08-07 NOTE — TELEPHONE ENCOUNTER
Sent mychart-advised refill was denied by covering provider. Instructed that the Donel Nett is not to be used as a scheduled med but minimally as a PRN per notes from last visit on 7/10/21 with Dr Edward Gee.

## 2021-08-07 NOTE — TELEPHONE ENCOUNTER
Patient indicated that he can not wait till Monday 8/9/2021. Needed norco refilled today. Please advise.

## 2021-08-07 NOTE — TELEPHONE ENCOUNTER
From: Matthew Mcclain  To: Elizabeth Kamara MD  Sent: 8/7/2021 12:15 AM CDT  Subject: Prescription Question    Lorena Ackerman,    48-72 hrs? It was a 7 day supply, I'm over due for my refill already. I can barely sleep with my migraines, even with the medication.  I ca

## 2021-08-09 ENCOUNTER — TELEPHONE (OUTPATIENT)
Dept: NEUROLOGY | Facility: CLINIC | Age: 26
End: 2021-08-09

## 2021-08-09 RX ORDER — HYDROCODONE BITARTRATE AND ACETAMINOPHEN 10; 325 MG/1; MG/1
1 TABLET ORAL EVERY 6 HOURS PRN
Qty: 30 TABLET | Refills: 0 | Status: SHIPPED | OUTPATIENT
Start: 2021-08-09 | End: 2021-08-14

## 2021-08-09 NOTE — TELEPHONE ENCOUNTER
Message noted: Chart reviewed and may refill norco as requested times one. Script sent to listed pharmacy by secure method. [place]. No concerns noted. Pt notified via [facility].

## 2021-08-09 NOTE — TELEPHONE ENCOUNTER
Dr. Estella Freed,   Patient called. Looking for refill for norco.   Dr Joanna Anderson had filled #10 only on 8/7/21. Patient states he usually gets #30. He has been c/o of migraines. Please review.

## 2021-08-10 ENCOUNTER — MED REC SCAN ONLY (OUTPATIENT)
Dept: NEUROLOGY | Facility: CLINIC | Age: 26
End: 2021-08-10

## 2021-08-16 RX ORDER — HYDROCODONE BITARTRATE AND ACETAMINOPHEN 10; 325 MG/1; MG/1
1 TABLET ORAL EVERY 6 HOURS PRN
Qty: 30 TABLET | Refills: 0 | Status: SHIPPED | OUTPATIENT
Start: 2021-08-16 | End: 2021-08-21

## 2021-08-16 NOTE — TELEPHONE ENCOUNTER
Message noted: Chart reviewed and may refill medication as requested. Script sent to listed pharmacy by secure method.     Pt notified through [facility]

## 2021-08-21 RX ORDER — HYDROCODONE BITARTRATE AND ACETAMINOPHEN 10; 325 MG/1; MG/1
1 TABLET ORAL EVERY 6 HOURS PRN
Qty: 30 TABLET | Refills: 0 | Status: SHIPPED | OUTPATIENT
Start: 2021-08-21 | End: 2021-08-27

## 2021-08-27 NOTE — TELEPHONE ENCOUNTER
Please review. Protocol failed / No Protocol. Requested Prescriptions   Pending Prescriptions Disp Refills    HYDROcodone-acetaminophen (NORCO)  MG Oral Tab 30 tablet 0     Sig: Take 1 tablet by mouth every 6 (six) hours as needed for Pain.  [facility]

## 2021-08-28 RX ORDER — HYDROCODONE BITARTRATE AND ACETAMINOPHEN 10; 325 MG/1; MG/1
1 TABLET ORAL EVERY 6 HOURS PRN
Qty: 30 TABLET | Refills: 0 | Status: SHIPPED | OUTPATIENT
Start: 2021-08-28 | End: 2021-09-02

## 2021-08-28 NOTE — TELEPHONE ENCOUNTER
Message noted: Chart reviewed and may refill medication as requested. Script sent to listed pharmacy by secure method.     Pt notified through [facility]

## 2021-09-02 ENCOUNTER — TELEPHONE (OUTPATIENT)
Dept: NEUROLOGY | Facility: CLINIC | Age: 26
End: 2021-09-02

## 2021-09-02 ENCOUNTER — DOCUMENTATION ONLY (OUTPATIENT)
Dept: NEUROLOGY | Facility: CLINIC | Age: 26
End: 2021-09-02

## 2021-09-02 NOTE — TELEPHONE ENCOUNTER
My NoteSigned  10:15 AM    Addend             ----- Message from Pastor Nj DO sent at 9/2/2021  8:44 AM CDT -----  Regarding: EEG results  Hi,Can you please contact the patient and let him know we received the results of his 48-hour EEG and that it was

## 2021-09-02 NOTE — PROGRESS NOTES
Received results from 48-hour EEG. EEG was started on 7/28/2021 at 2:26 PM and completed on 7/30/2021 at 9:03 AM.  There were 12 facial events recorded. There were no epileptic activities during these events.   The EEG tech clipped 13 events from the re

## 2021-09-03 RX ORDER — HYDROCODONE BITARTRATE AND ACETAMINOPHEN 10; 325 MG/1; MG/1
1 TABLET ORAL EVERY 6 HOURS PRN
Qty: 30 TABLET | Refills: 0 | OUTPATIENT
Start: 2021-09-03

## 2021-09-03 RX ORDER — HYDROCODONE BITARTRATE AND ACETAMINOPHEN 10; 325 MG/1; MG/1
1 TABLET ORAL EVERY 6 HOURS PRN
Qty: 30 TABLET | Refills: 0 | Status: SHIPPED | OUTPATIENT
Start: 2021-09-03 | End: 2021-09-08

## 2021-09-03 NOTE — TELEPHONE ENCOUNTER
Please review; protocol failed. Requested Prescriptions   Pending Prescriptions Disp Refills    HYDROcodone-acetaminophen (NORCO)  MG Oral Tab 30 tablet 0     Sig: Take 1 tablet by mouth every 6 (six) hours as needed for Pain.  Medication may cause

## 2021-09-08 ENCOUNTER — TELEPHONE (OUTPATIENT)
Dept: FAMILY MEDICINE CLINIC | Facility: CLINIC | Age: 26
End: 2021-09-08

## 2021-09-08 RX ORDER — HYDROCODONE BITARTRATE AND ACETAMINOPHEN 10; 325 MG/1; MG/1
1 TABLET ORAL EVERY 6 HOURS PRN
Qty: 30 TABLET | Refills: 0 | OUTPATIENT
Start: 2021-09-08

## 2021-09-08 NOTE — TELEPHONE ENCOUNTER
Dr Naye De Luna se paper for [facility] on your desk,  I printed out Rx agreement form if you need the patient to come I to sign it.

## 2021-09-09 RX ORDER — HYDROCODONE BITARTRATE AND ACETAMINOPHEN 10; 325 MG/1; MG/1
1 TABLET ORAL EVERY 6 HOURS PRN
Qty: 30 TABLET | Refills: 0 | Status: SHIPPED | OUTPATIENT
Start: 2021-09-09 | End: 2021-09-14

## 2021-09-09 NOTE — TELEPHONE ENCOUNTER
Message noted: Chart reviewed and may refill medication as requested. Script sent to listed pharmacy by secure method.     Pt notified through [facility]

## 2021-09-15 RX ORDER — HYDROCODONE BITARTRATE AND ACETAMINOPHEN 10; 325 MG/1; MG/1
1 TABLET ORAL EVERY 6 HOURS PRN
Qty: 30 TABLET | Refills: 0 | Status: SHIPPED | OUTPATIENT
Start: 2021-09-15 | End: 2021-09-20

## 2021-09-15 RX ORDER — HYDROCODONE BITARTRATE AND ACETAMINOPHEN 10; 325 MG/1; MG/1
1 TABLET ORAL EVERY 6 HOURS PRN
Qty: 30 TABLET | Refills: 0 | OUTPATIENT
Start: 2021-09-15

## 2021-09-15 NOTE — TELEPHONE ENCOUNTER
Message noted: Chart reviewed and may refill medication as requested. Script sent to listed pharmacy by secure method.     Pt notified through [facility]

## 2021-09-20 ENCOUNTER — TELEPHONE (OUTPATIENT)
Dept: NEUROLOGY | Facility: CLINIC | Age: 26
End: 2021-09-20

## 2021-09-20 RX ORDER — HYDROCODONE BITARTRATE AND ACETAMINOPHEN 10; 325 MG/1; MG/1
1 TABLET ORAL EVERY 6 HOURS PRN
Qty: 30 TABLET | Refills: 0 | Status: SHIPPED | OUTPATIENT
Start: 2021-09-20 | End: 2021-09-26

## 2021-09-20 NOTE — TELEPHONE ENCOUNTER
Patient called to say he needs status as to whether he can return to work or he needs his   Disability paperwork completed (said he sent it in approximately 2 months ago)

## 2021-09-20 NOTE — TELEPHONE ENCOUNTER
Message noted: Chart reviewed and may refill medication as requested. Script sent to listed pharmacy by secure method.     Pt notified through [facility]

## 2021-09-20 NOTE — TELEPHONE ENCOUNTER
Patient states he is changing from short term to long term disability. I advised we have not received any new paperwork from patient or [facility]. States he is on other line with [facility] now and he will have therm send paperwork to this office.  I gave Marylu Barkley

## 2021-09-21 NOTE — TELEPHONE ENCOUNTER
Hi,  I can fill out whatever paperwork we need when we get it. If it comes when I am on leave and its time sensitive maybe we can scan it and if someone calls me I can sign it? Please let him know I will be out from 9/22 until 10/22.   Thanks,  Dr. Wm Klein

## 2021-09-27 ENCOUNTER — PATIENT MESSAGE (OUTPATIENT)
Dept: NEUROLOGY | Facility: CLINIC | Age: 26
End: 2021-09-27

## 2021-09-27 RX ORDER — HYDROCODONE BITARTRATE AND ACETAMINOPHEN 10; 325 MG/1; MG/1
1 TABLET ORAL EVERY 6 HOURS PRN
Qty: 30 TABLET | Refills: 0 | Status: SHIPPED | OUTPATIENT
Start: 2021-09-27 | End: 2021-10-01

## 2021-09-27 NOTE — TELEPHONE ENCOUNTER
Message noted: Chart reviewed and may refill medication as requested. Script sent to listed pharmacy by secure method.     Pt notified through [facility]

## 2021-09-28 NOTE — TELEPHONE ENCOUNTER
See other patient  message from 9/27/21. I spoke to patient now, advised no new paperwork has been obtained by this office to fill out for [address]. I advised patient this office will fill out [id number] form when it is delivered to this office.  Last contact

## 2021-09-28 NOTE — TELEPHONE ENCOUNTER
From: Juana Szymanski  To: Jose Alfredo Dumont DO  Sent: 9/27/2021 8:04 PM CDT  Subject: Also! The policy needs to be queen extended in order for them to approve the policy. So please extend the policy until our next appt in November. Thank you    PAnayeliS.  Please

## 2021-09-28 NOTE — TELEPHONE ENCOUNTER
See other patient message from patient from 9/27/21 for further information. See phone encounter 9/20/21 for further information. I spoke to patient today.  Advised this office has not received any recent  paperwork from him or [employer] to be filled ou

## 2021-09-28 NOTE — TELEPHONE ENCOUNTER
From: Lisbet Bennett  To: Thomas Deleon DO  Sent: 9/27/2021 6:39 PM CDT  Subject: Please respond GIANLUCA Mcnamara been trying to get a hold of you for weeks. I really need you to fill out and send in the necessary medical paperwork for my long term disability.  It

## 2021-10-01 RX ORDER — HYDROCODONE BITARTRATE AND ACETAMINOPHEN 10; 325 MG/1; MG/1
1 TABLET ORAL EVERY 6 HOURS PRN
Qty: 30 TABLET | Refills: 0 | Status: SHIPPED | OUTPATIENT
Start: 2021-10-01 | End: 2021-10-07

## 2021-10-02 NOTE — TELEPHONE ENCOUNTER
Message noted: Chart reviewed and may refill medication as requested. Script sent to listed pharmacy by secure method.     Pt notified through [facility]

## 2021-10-07 RX ORDER — HYDROCODONE BITARTRATE AND ACETAMINOPHEN 10; 325 MG/1; MG/1
1 TABLET ORAL EVERY 6 HOURS PRN
Qty: 30 TABLET | Refills: 0 | Status: SHIPPED | OUTPATIENT
Start: 2021-10-07 | End: 2021-10-12

## 2021-10-07 NOTE — TELEPHONE ENCOUNTER
Spoke with pt,  verified  Pt stated he has family emergency and he is going out of town today. Pt is looking for rx ref for gen norco  mg.   pls advise, thanks in advance.            Requested Prescriptions     Pending Prescriptions Disp Refills

## 2021-10-07 NOTE — TELEPHONE ENCOUNTER
Message noted: Chart reviewed and may refill norco as requested. Script sent to listed pharmacy by secure method. No concerns noted. Please notify patient.

## 2021-10-11 NOTE — TELEPHONE ENCOUNTER
Kassandra Garcias called to say that [facility] faxed over the paperwork to be filled out on October 5th. He wants to be sure we received it and it is being worked on.

## 2021-10-11 NOTE — TELEPHONE ENCOUNTER
Called pt to notify have not received paperwork from [facility] to fill out. No answer. Voice mailbox full. Pt returned call stating he has the forms & he will figure out how to fax them of get them to the office.

## 2021-10-13 RX ORDER — HYDROCODONE BITARTRATE AND ACETAMINOPHEN 10; 325 MG/1; MG/1
1 TABLET ORAL EVERY 6 HOURS PRN
Qty: 30 TABLET | Refills: 0 | Status: SHIPPED | OUTPATIENT
Start: 2021-10-13 | End: 2021-10-19

## 2021-10-13 NOTE — TELEPHONE ENCOUNTER
Message noted: Chart reviewed and may refill medication as requested. Script sent to listed pharmacy by secure method.     Pt notified through [facility]

## 2021-10-20 RX ORDER — HYDROCODONE BITARTRATE AND ACETAMINOPHEN 10; 325 MG/1; MG/1
1 TABLET ORAL EVERY 6 HOURS PRN
Qty: 30 TABLET | Refills: 0 | Status: SHIPPED | OUTPATIENT
Start: 2021-10-20 | End: 2021-10-25

## 2021-10-20 NOTE — TELEPHONE ENCOUNTER
Message noted: Chart reviewed and may refill medication as requested. Script sent to listed pharmacy by secure method.     Pt notified through [facility]

## 2021-10-21 NOTE — TELEPHONE ENCOUNTER
Received fax from [employer] through [email or web address]. They are requesting medical records from 10/1/20 to 12/31/20. Received signed authorization. No paperwork to fill out. Patient's first off visit was on 1/27/21.   No neurology records from 10/1/20 to 12

## 2021-10-21 NOTE — TELEPHONE ENCOUNTER
Patient called to say that [facility] said we should have the paperwork. He is asking that the paperwork be filled out ASAP as he has not been paid for (3) months.

## 2021-10-21 NOTE — TELEPHONE ENCOUNTER
Spoke to pt to notify we have not received paperwork from [facility] [employer]. Pt frustrated that we have not received paperwork & filled it out. Verified fax number. Asked pt to have paperwork re-faxed.  Awaiting paperwork

## 2021-10-25 RX ORDER — HYDROCODONE BITARTRATE AND ACETAMINOPHEN 10; 325 MG/1; MG/1
1 TABLET ORAL EVERY 6 HOURS PRN
Qty: 30 TABLET | Refills: 0 | Status: SHIPPED | OUTPATIENT
Start: 2021-10-25 | End: 2021-11-03

## 2021-10-25 NOTE — TELEPHONE ENCOUNTER
Message noted: Chart reviewed and may refill medication as requested. Script sent to listed pharmacy by secure method.     Pt notified through [facility]

## 2021-10-25 NOTE — TELEPHONE ENCOUNTER
Please review. Protocol failed or has no protocol. Requested Prescriptions   Pending Prescriptions Disp Refills    HYDROcodone-acetaminophen (NORCO)  MG Oral Tab 30 tablet 0     Sig: Take 1 tablet by mouth every 6 (six) hours as needed for Pain.

## 2021-10-27 NOTE — TELEPHONE ENCOUNTER
Patient called again to let the staff know that he is now homeless and needs his disability forms filled out immediately

## 2021-10-27 NOTE — TELEPHONE ENCOUNTER
[employer] message sent to patient with my previous note of medical records being sent. [facility] requested medical records last week. The request was for medical records from 2020.   Patient did not start seeing Dr. Baron Roblero until 2021 -- even so, records from

## 2021-10-28 ENCOUNTER — TELEPHONE (OUTPATIENT)
Dept: NEUROLOGY | Facility: CLINIC | Age: 26
End: 2021-10-28

## 2021-11-01 RX ORDER — HYDROCODONE BITARTRATE AND ACETAMINOPHEN 10; 325 MG/1; MG/1
1 TABLET ORAL EVERY 6 HOURS PRN
Qty: 30 TABLET | Refills: 0 | OUTPATIENT
Start: 2021-11-01

## 2021-11-01 NOTE — TELEPHONE ENCOUNTER
Message noted; pt will need to make follow up visit from hospitalization then to discuss if safe to continue norco. Pt can set up video visit. Also if he can provide discharge papers or psych evaluation from hospital that  Would be great.

## 2021-11-01 NOTE — TELEPHONE ENCOUNTER
Dr. Nancy Kurtz:   Patient called. He is asking for refill on medication. Patient did admit he was admitted [address] [facility]).  (1-2 days)  For psych eval. He said a loved one thought he was going to hurt himself and took him to hospital. He sa

## 2021-11-01 NOTE — TELEPHONE ENCOUNTER
Hold off on refill for now. Received notification pt was ? Admitted/ went to ER at outside hospital recently but records not available in CARE everywhere.

## 2021-11-02 NOTE — TELEPHONE ENCOUNTER
Called patient. Mailbox full and cannot accept messages at this time. Will send message via [address]

## 2021-11-03 ENCOUNTER — TELEMEDICINE (OUTPATIENT)
Dept: FAMILY MEDICINE CLINIC | Facility: CLINIC | Age: 26
End: 2021-11-03

## 2021-11-03 DIAGNOSIS — F32.A DEPRESSION, UNSPECIFIED DEPRESSION TYPE: Primary | ICD-10-CM

## 2021-11-03 DIAGNOSIS — G89.29 CHRONIC NONINTRACTABLE HEADACHE, UNSPECIFIED HEADACHE TYPE: ICD-10-CM

## 2021-11-03 DIAGNOSIS — R51.9 CHRONIC NONINTRACTABLE HEADACHE, UNSPECIFIED HEADACHE TYPE: ICD-10-CM

## 2021-11-03 PROCEDURE — 99213 OFFICE O/P EST LOW 20 MIN: CPT | Performed by: FAMILY MEDICINE

## 2021-11-03 RX ORDER — HYDROCODONE BITARTRATE AND ACETAMINOPHEN 10; 325 MG/1; MG/1
1 TABLET ORAL EVERY 6 HOURS PRN
Qty: 30 TABLET | Refills: 0 | Status: SHIPPED | OUTPATIENT
Start: 2021-11-03 | End: 2021-11-08

## 2021-11-03 NOTE — TELEPHONE ENCOUNTER
From   Kim Pires RN To   Darby King and Delivered   11/2/2021 10:45 AM   Last Read in MyChart   11/2/2021  3:11 PM by Devon Boyd

## 2021-11-03 NOTE — PROGRESS NOTES
Subjective:   Patient ID: Makenzie Guillen is a 27year old male. This visit is conducted using Telemedicine with live, interactive video and audio during this Coronavirus pandemic.     Please note that the following visit was completed using two-way, real-ti up.    [facility]  Discharge Summary      Admitting Provider: Dr. Della Mancia MD   Discharging Provider: Khushboo Farooq MD  Primary Care Physician at Discharge: No PCP,     Date of Initial Evaluation:10/29/21  Discharge Date: 10/30/2021 conditions/issues. No future appointments. Appointments Scheduled Outside of [address] [place] [address] Drive:   Patient to schedule follow-up therapy and medication management   appointments independently with already established providers.      [facility] (six) hours as needed for Pain. Medication may causes sedation, constipation. Not to operate heavy machinery or drive on medication. 30 tablet 0   • Pyridoxine HCl 100 MG Oral Tab Take 1 tablet (100 mg total) by mouth daily.  90 tablet 1   • levetiracetam 5 norco reviewed and renewed; encourage pt to reduce usage and only take as needed. To continue present treatment for now and follow up with neurology as planned; To call if problems; Routine follow up in 3 months.  Patient verbalized understanding of recomme

## 2021-11-08 RX ORDER — HYDROCODONE BITARTRATE AND ACETAMINOPHEN 10; 325 MG/1; MG/1
1 TABLET ORAL EVERY 6 HOURS PRN
Qty: 30 TABLET | Refills: 0 | Status: SHIPPED | OUTPATIENT
Start: 2021-11-08 | End: 2021-11-13

## 2021-11-08 NOTE — TELEPHONE ENCOUNTER
Message noted: Chart reviewed and may refill medication as requested. Script sent to listed pharmacy by secure method.     Pt notified through [facility]

## 2021-11-09 RX ORDER — HYDROCODONE BITARTRATE AND ACETAMINOPHEN 10; 325 MG/1; MG/1
1 TABLET ORAL EVERY 6 HOURS PRN
Qty: 30 TABLET | Refills: 0 | OUTPATIENT
Start: 2021-11-09

## 2021-11-09 NOTE — TELEPHONE ENCOUNTER
Patient called and he is in [place] and would like his hydrocodone sent there.  Medication has been pended for approval

## 2021-11-09 NOTE — TELEPHONE ENCOUNTER
Why is patient asking script to be sent out of state- [place]. We usually do not prescribe controlled substances out of state?

## 2021-11-09 NOTE — TELEPHONE ENCOUNTER
Patient returned call and states he will  current prescription from his pharmacy in [place]. He will be staying in [address] [place] [address] to help take care of his grandmother which is why he was requesting the script be sent there.   States he may need to

## 2021-11-13 RX ORDER — HYDROCODONE BITARTRATE AND ACETAMINOPHEN 10; 325 MG/1; MG/1
1 TABLET ORAL EVERY 6 HOURS PRN
Qty: 30 TABLET | Refills: 0 | Status: SHIPPED | OUTPATIENT
Start: 2021-11-13 | End: 2021-11-20

## 2021-11-13 NOTE — TELEPHONE ENCOUNTER
Please review refill protocol failed/ no protocol  Requested Prescriptions   Pending Prescriptions Disp Refills    HYDROcodone-acetaminophen (NORCO)  MG Oral Tab 30 tablet 0     Sig: Take 1 tablet by mouth every 6 (six) hours as needed for Pain.  Medi

## 2021-11-13 NOTE — TELEPHONE ENCOUNTER
Message noted: Chart reviewed and may refill medication as requested. Script sent to listed pharmacy by secure method.     Pt notified through [facility]

## 2021-11-19 RX ORDER — HYDROCODONE BITARTRATE AND ACETAMINOPHEN 10; 325 MG/1; MG/1
1 TABLET ORAL EVERY 6 HOURS PRN
Qty: 30 TABLET | Refills: 0 | Status: CANCELLED | OUTPATIENT
Start: 2021-11-19

## 2021-11-20 RX ORDER — HYDROCODONE BITARTRATE AND ACETAMINOPHEN 10; 325 MG/1; MG/1
1 TABLET ORAL EVERY 6 HOURS PRN
Qty: 30 TABLET | Refills: 0 | Status: SHIPPED | OUTPATIENT
Start: 2021-11-20 | End: 2021-11-26

## 2021-11-20 NOTE — TELEPHONE ENCOUNTER
Message noted: Chart reviewed and may refill medication as requested. Script sent to listed pharmacy by secure method.     Pt notified through [facility]

## 2021-11-26 RX ORDER — HYDROCODONE BITARTRATE AND ACETAMINOPHEN 10; 325 MG/1; MG/1
1 TABLET ORAL EVERY 6 HOURS PRN
Qty: 30 TABLET | Refills: 0 | Status: SHIPPED | OUTPATIENT
Start: 2021-11-26 | End: 2021-12-03

## 2021-11-27 NOTE — TELEPHONE ENCOUNTER
Message noted: Chart reviewed and may refill medication as requested. Script sent to listed pharmacy by secure method.     Pt notified through [facility]

## 2021-11-27 NOTE — TELEPHONE ENCOUNTER
Please review. Protocol failed or does not have a protocol.      Requested Prescriptions   Pending Prescriptions Disp Refills    HYDROcodone-acetaminophen (NORCO)  MG Oral Tab 30 tablet 0     Sig: Take 1 tablet by mouth every 6 (six) hours as needed f

## 2021-11-29 ENCOUNTER — TELEMEDICINE (OUTPATIENT)
Dept: NEUROLOGY | Facility: CLINIC | Age: 26
End: 2021-11-29
Payer: COMMERCIAL

## 2021-11-29 DIAGNOSIS — G40.802 OTHER EPILEPSY WITHOUT STATUS EPILEPTICUS, NOT INTRACTABLE (HCC): Primary | ICD-10-CM

## 2021-11-29 PROCEDURE — 99213 OFFICE O/P EST LOW 20 MIN: CPT | Performed by: OTHER

## 2021-11-29 RX ORDER — ESCITALOPRAM OXALATE 20 MG/1
20 TABLET ORAL EVERY MORNING
COMMUNITY
Start: 2021-10-27

## 2021-11-29 RX ORDER — ALPRAZOLAM 0.5 MG/1
0.5 TABLET ORAL 2 TIMES DAILY PRN
COMMUNITY
Start: 2021-11-05

## 2021-11-29 RX ORDER — LEVETIRACETAM 500 MG/1
500 TABLET ORAL 2 TIMES DAILY
Qty: 180 TABLET | Refills: 1 | Status: SHIPPED | OUTPATIENT
Start: 2021-11-29 | End: 2021-11-30

## 2021-11-29 RX ORDER — DEXTROAMPHETAMINE SULFATE, DEXTROAMPHETAMINE SACCHARATE, AMPHETAMINE SULFATE AND AMPHETAMINE ASPARTATE 5; 5; 5; 5 MG/1; MG/1; MG/1; MG/1
20 CAPSULE, EXTENDED RELEASE ORAL EVERY MORNING
COMMUNITY
Start: 2021-11-05

## 2021-11-29 NOTE — PATIENT INSTRUCTIONS
1.  Please get your Keppra level drawn at the [facility] lab   [address] [facility]. The prescription will be faxed to that lab. You will need to make an appointment. 2.  Continue to take your Keppra 5 mg twice a day.     3.  We will discuss the po not getting enough sleep  · At times of fevers or other illnesses  · Flashing bright lights or patterns  · Alcohol or drug use  · Stress  · Associated with menstrual cycle (women) or other hormonal changes  · Not eating well, low blood sugar  · Specific fo anywhere from a few seconds to minutes. Seizures can occur after a head injury, stroke, or brain tissue infection. In more than half of patients, the cause is not known. This is called epilepsy. INSTRUCTIONS:   1.  Please continue to take keppra to preven has a temperature over 102 F (39C)   e. The patient vomited and now is having trouble breathing    Please continue to practice seizure precautions and first aid. Please do not climb to high places, such as rooftops, up trees or mountain climbing.  When ne Mattresses and pillows should be firm and not soft. All stuffed animals, toys and other objects should be removed from the bed. Blankets can be layered but should be thin, down comforters may be too soft. Keep the bed as low to the ground as possible.

## 2021-11-29 NOTE — PROGRESS NOTES
[address]  [address] [facility], [address] [place] [address]  [phone]        Neurology Video Visit Follow Up Note    I conducted a telehealth visit with Tennis Gun today, 11/29/21, which was completed using two-way, real-time i states his taxes girlfriend at 1:17 AM and then awoke \"in a puddle of blood\" with injuries to his face, laceration on the lateral aspect of his tongue from a bite and amnesia/confusion. He initially thought that he had had a panic attack.   Because of hi the stressors of his life. Still having daily headaches. Being managed by Dr. Shereen Mccarty. Discussed medication overuse headache. Advised that he follow-up with pain management.   He states that this will be difficult given that his insurance will change/and Katey  He lost his car and their home. He was living in a hotel. His sister is supporting him financially. She is paying for an apartment. He is not working for his [employer].      He is unsure what is going on w/ his job  He has called HR; h spoke with the patient's significant other, who is a nurse. I explained that the diagnosis of epilepsy was a clinical diagnosis. I explained that a normal MRI brain and a negative long-term EEG do not exclude the possibility that someone has epilepsy.   G 1  •  busPIRone HCl 15 MG Oral Tab, Take 15 mg by mouth every morning., Disp: , Rfl:   •  BusPIRone HCl 10 MG Oral Tab, Take by mouth 2 (two) times daily.  15 mg in am and 30 mg in pm , Disp: , Rfl:       Reviewed and assessed      Objective:    Exam:    - his father, and loss of custody of his children. I advised the patient that he needs his Keppra level checked. I advised him that he may need another Keppra level checked between now and his next visit in 1 month.   I explained to him that in order for to: patient   Barriers to Learning: none  Content: Refer to note above   Evaluation/Outcome: verbalized undertanding    This document is not intended to support charting by exception.   Sections left blank in a completed note should be presumed not to have

## 2021-11-30 ENCOUNTER — TELEPHONE (OUTPATIENT)
Dept: NEUROLOGY | Facility: CLINIC | Age: 26
End: 2021-11-30

## 2021-11-30 DIAGNOSIS — G40.802 OTHER EPILEPSY WITHOUT STATUS EPILEPTICUS, NOT INTRACTABLE (HCC): Primary | ICD-10-CM

## 2021-11-30 RX ORDER — LEVETIRACETAM 750 MG/1
750 TABLET ORAL 2 TIMES DAILY
Qty: 180 TABLET | Refills: 1 | Status: SHIPPED | OUTPATIENT
Start: 2021-11-30 | End: 2022-05-29

## 2021-11-30 NOTE — TELEPHONE ENCOUNTER
----- Message from Heike Langley DO sent at 11/29/2021  4:20 PM CST -----  Regarding: keppra level and follow up    Hi,  1. Can we please get a copy of his Keppra level from the summer? I do not see it in the chart. It was ordered in July.   The order wa

## 2021-11-30 NOTE — TELEPHONE ENCOUNTER
Thank you. That means he was subtherapeutic. He needs to be on a higher dose as long as he was adherent. Will increase the dose to 750mg BID. Can you pls call him and let him know?   Thanks  [employer]

## 2021-11-30 NOTE — TELEPHONE ENCOUNTER
Spoke with Brandyn from [facility] to request [address] level result from July. Brandyn faxing results to office @ [phone]. Awaiting Fax. FAXED ORDER for Keppra level from 11/29/21 to [facility] @ [phone]. Confirmation received.     FA

## 2021-11-30 NOTE — TELEPHONE ENCOUNTER
Called & spoke to Herrera Mcguire to notify new prescription with increased dosage electronically sent to [place], [place]. Pt verified that was correct pharmacy & will .

## 2021-12-04 RX ORDER — HYDROCODONE BITARTRATE AND ACETAMINOPHEN 10; 325 MG/1; MG/1
1 TABLET ORAL EVERY 6 HOURS PRN
Qty: 30 TABLET | Refills: 0 | Status: SHIPPED | OUTPATIENT
Start: 2021-12-04 | End: 2021-12-09

## 2021-12-04 NOTE — TELEPHONE ENCOUNTER
Message noted: Chart reviewed and may refill medication as requested. Script sent to listed pharmacy by secure method.     Pt notified through [facility]

## 2021-12-09 RX ORDER — HYDROCODONE BITARTRATE AND ACETAMINOPHEN 10; 325 MG/1; MG/1
1 TABLET ORAL EVERY 6 HOURS PRN
Qty: 30 TABLET | Refills: 0 | Status: SHIPPED | OUTPATIENT
Start: 2021-12-09 | End: 2021-12-16

## 2021-12-09 NOTE — TELEPHONE ENCOUNTER
Message noted: Chart reviewed and may refill medication as requested. Script sent to listed pharmacy by secure method.     Pt notified through [facility]

## 2021-12-16 RX ORDER — HYDROCODONE BITARTRATE AND ACETAMINOPHEN 10; 325 MG/1; MG/1
1 TABLET ORAL EVERY 6 HOURS PRN
Qty: 30 TABLET | Refills: 0 | OUTPATIENT
Start: 2021-12-16

## 2021-12-16 RX ORDER — HYDROCODONE BITARTRATE AND ACETAMINOPHEN 10; 325 MG/1; MG/1
1 TABLET ORAL EVERY 6 HOURS PRN
Qty: 30 TABLET | Refills: 0 | Status: SHIPPED | OUTPATIENT
Start: 2021-12-16 | End: 2021-12-23

## 2021-12-17 NOTE — TELEPHONE ENCOUNTER
Message noted: Chart reviewed and may refill medication as requested. Script sent to listed pharmacy by secure method.     Pt notified through [facility]

## 2021-12-23 RX ORDER — HYDROCODONE BITARTRATE AND ACETAMINOPHEN 10; 325 MG/1; MG/1
1 TABLET ORAL EVERY 6 HOURS PRN
Qty: 30 TABLET | Refills: 0 | Status: SHIPPED | OUTPATIENT
Start: 2021-12-23 | End: 2021-12-29

## 2021-12-23 NOTE — TELEPHONE ENCOUNTER
Message noted: Chart reviewed and may refill medication as requested. Script sent to listed pharmacy by secure method.     Pt notified through [facility]

## 2021-12-30 RX ORDER — HYDROCODONE BITARTRATE AND ACETAMINOPHEN 10; 325 MG/1; MG/1
1 TABLET ORAL EVERY 6 HOURS PRN
Qty: 30 TABLET | Refills: 0 | Status: SHIPPED | OUTPATIENT
Start: 2021-12-30 | End: 2022-01-04

## 2021-12-30 NOTE — TELEPHONE ENCOUNTER
Message noted: Chart reviewed and may refill norco as requested times one. Script sent to listed pharmacy by secure method. [place]. No concerns noted. Please notify patient/ Pt notified via [facility].

## 2022-01-04 RX ORDER — HYDROCODONE BITARTRATE AND ACETAMINOPHEN 10; 325 MG/1; MG/1
1 TABLET ORAL EVERY 6 HOURS PRN
Qty: 30 TABLET | Refills: 0 | Status: SHIPPED | OUTPATIENT
Start: 2022-01-04 | End: 2022-01-10

## 2022-01-05 NOTE — TELEPHONE ENCOUNTER
Message noted: Chart reviewed and may refill medication as requested. Script sent to listed pharmacy by secure method.     Pt notified through [facility]

## 2022-01-10 ENCOUNTER — TELEPHONE (OUTPATIENT)
Dept: FAMILY MEDICINE CLINIC | Facility: CLINIC | Age: 27
End: 2022-01-10

## 2022-01-10 ENCOUNTER — TELEPHONE (OUTPATIENT)
Dept: NEUROLOGY | Facility: CLINIC | Age: 27
End: 2022-01-10

## 2022-01-10 RX ORDER — HYDROCODONE BITARTRATE AND ACETAMINOPHEN 10; 325 MG/1; MG/1
1 TABLET ORAL EVERY 6 HOURS PRN
Qty: 30 TABLET | Refills: 0 | Status: SHIPPED | OUTPATIENT
Start: 2022-01-10 | End: 2022-01-18

## 2022-01-10 NOTE — TELEPHONE ENCOUNTER
Called & spoke to pt who states he hasn't gotten anything from [employer] in 7 months, so he reached out to them to see if they needed something else so he could facilitate the process.  Pt states he has been unable to work & is broke & will be unable to pay f

## 2022-01-10 NOTE — TELEPHONE ENCOUNTER
Patient calling saying that he needs his entire medical record so he can try to get back pay from his employer. Gave him number for medical records ([phone]) and transferred him.

## 2022-01-10 NOTE — TELEPHONE ENCOUNTER
Called & spoke to pt to notify of below message from Dr Elena Hidalgo. Pt states unable to access voicemail to empty. Pt states it is broken.

## 2022-01-10 NOTE — TELEPHONE ENCOUNTER
Hi,  Please let Emanuel Hernández know I'm sorry for his troubles. Medicaid will cover Keppra. I am not aware of any low cost options for Keppra. Whenever we do get in touch with him please ask him to empty his voicemail box.     Marc Soto, DO  [facility]

## 2022-01-11 NOTE — TELEPHONE ENCOUNTER
Message noted: Chart reviewed and may refill medication as requested. Script sent to listed pharmacy by secure method.     Pt notified through [facility]

## 2022-01-18 RX ORDER — HYDROCODONE BITARTRATE AND ACETAMINOPHEN 10; 325 MG/1; MG/1
1 TABLET ORAL EVERY 6 HOURS PRN
Qty: 30 TABLET | Refills: 0 | Status: SHIPPED | OUTPATIENT
Start: 2022-01-18 | End: 2022-01-24

## 2022-01-18 NOTE — TELEPHONE ENCOUNTER
Please review; protocol failed/no protocol. Requested Prescriptions   Pending Prescriptions Disp Refills    HYDROcodone-acetaminophen (NORCO)  MG Oral Tab 30 tablet 0     Sig: Take 1 tablet by mouth every 6 (six) hours as needed for Pain.  Noah Dillon

## 2022-01-18 NOTE — TELEPHONE ENCOUNTER
Message noted: Chart reviewed and may refill medication as requested. Script sent to listed pharmacy by secure method.     Pt notified through [facility]

## 2022-01-24 RX ORDER — HYDROCODONE BITARTRATE AND ACETAMINOPHEN 10; 325 MG/1; MG/1
1 TABLET ORAL EVERY 6 HOURS PRN
Qty: 30 TABLET | Refills: 0 | Status: SHIPPED | OUTPATIENT
Start: 2022-01-24 | End: 2022-01-30

## 2022-01-24 NOTE — TELEPHONE ENCOUNTER
Please review. Protocol Failed / No Protocol. Requested Prescriptions   Pending Prescriptions Disp Refills    HYDROcodone-acetaminophen (NORCO)  MG Oral Tab 30 tablet 0     Sig: Take 1 tablet by mouth every 6 (six) hours as needed for Pain.  Medic

## 2022-01-31 RX ORDER — HYDROCODONE BITARTRATE AND ACETAMINOPHEN 10; 325 MG/1; MG/1
1 TABLET ORAL EVERY 6 HOURS PRN
Qty: 30 TABLET | Refills: 0 | Status: SHIPPED | OUTPATIENT
Start: 2022-01-31 | End: 2022-02-05

## 2022-01-31 NOTE — TELEPHONE ENCOUNTER
Message noted: Chart reviewed and may refill medication as requested. Script sent to listed pharmacy by secure method.     Pt notified through [facility]

## 2022-02-07 RX ORDER — HYDROCODONE BITARTRATE AND ACETAMINOPHEN 10; 325 MG/1; MG/1
1 TABLET ORAL EVERY 6 HOURS PRN
Qty: 30 TABLET | Refills: 0 | OUTPATIENT
Start: 2022-02-07

## 2022-02-07 RX ORDER — HYDROCODONE BITARTRATE AND ACETAMINOPHEN 10; 325 MG/1; MG/1
1 TABLET ORAL EVERY 6 HOURS PRN
Qty: 30 TABLET | Refills: 0 | Status: SHIPPED | OUTPATIENT
Start: 2022-02-07 | End: 2022-02-12

## 2022-02-07 NOTE — TELEPHONE ENCOUNTER
Message noted: Chart reviewed and may refill medication as requested. Script sent to listed pharmacy by secure method.     Pt notified through [facility]

## 2022-02-07 NOTE — TELEPHONE ENCOUNTER
Please review. Protocol failed / No protocol. Requested Prescriptions   Pending Prescriptions Disp Refills    HYDROcodone-acetaminophen (NORCO)  MG Oral Tab 30 tablet 0     Sig: Take 1 tablet by mouth every 6 (six) hours as needed for Pain. Medication may causes sedation, constipation. Not to operate heavy machinery or drive on medication.         There is no refill protocol information for this order             Recent Outpatient Visits              2 months ago Other epilepsy without status epilepticus, not intractable [facility])    [address] [facility], Shan Alvarado DO    Telemedicine    3 months ago Depression, unspecified depression type    [facility], [facility], [address] Tedra Morrow, MD    Telemedicine    7 months ago Other epilepsy without status epilepticus, not intractable [place] [address], Shan Alvarado DO    Telemedicine    7 months ago [address] [facility], [address] MD    Telemedicine    8 months ago Post concussion syndrome    Giancarlo Wood MD    Telemedicine

## 2022-02-12 NOTE — TELEPHONE ENCOUNTER
Please review. Protocol failed / No protocol. Requested Prescriptions   Pending Prescriptions Disp Refills    HYDROcodone-acetaminophen (NORCO)  MG Oral Tab 30 tablet 0     Sig: Take 1 tablet by mouth every 6 (six) hours as needed for Pain. Medication may causes sedation, constipation. Not to operate heavy machinery or drive on medication.         There is no refill protocol information for this order               Recent Outpatient Visits              2 months ago Other epilepsy without status epilepticus, not intractable [facility])    [facility] [facility], DO    Telemedicine    3 months ago Depression, unspecified depression type    [facility], [facility], [address] Nathan Rue, MD    Telemedicine    7 months ago Other epilepsy without status epilepticus, not intractable [facility])    [address], Jackson, DO    Telemedicine    7 months ago [address], [address] MD    Telemedicine    8 months ago Post concussion syndrome    Jaja Houser MD    Telemedicine

## 2022-02-13 RX ORDER — HYDROCODONE BITARTRATE AND ACETAMINOPHEN 10; 325 MG/1; MG/1
1 TABLET ORAL EVERY 6 HOURS PRN
Qty: 30 TABLET | Refills: 0 | Status: SHIPPED | OUTPATIENT
Start: 2022-02-13 | End: 2022-02-18

## 2022-02-14 NOTE — TELEPHONE ENCOUNTER
Message noted: Chart reviewed and may refill medication as requested. Script sent to listed pharmacy by secure method.     Pt notified through [facility]

## 2022-02-19 RX ORDER — HYDROCODONE BITARTRATE AND ACETAMINOPHEN 10; 325 MG/1; MG/1
1 TABLET ORAL EVERY 6 HOURS PRN
Qty: 30 TABLET | Refills: 0 | Status: SHIPPED | OUTPATIENT
Start: 2022-02-19 | End: 2022-02-24

## 2022-02-19 NOTE — TELEPHONE ENCOUNTER
Message noted: Chart reviewed and may refill medication as requested. Script sent to listed pharmacy by secure method.     Pt notified through [facility]

## 2022-02-19 NOTE — TELEPHONE ENCOUNTER
Please review. Protocol failed / No protocol. Requested Prescriptions   Pending Prescriptions Disp Refills    HYDROcodone-acetaminophen (NORCO)  MG Oral Tab 30 tablet 0     Sig: Take 1 tablet by mouth every 6 (six) hours as needed for Pain. Medication may causes sedation, constipation. Not to operate heavy machinery or drive on medication.         There is no refill protocol information for this order             Recent Outpatient Visits              2 months ago Other epilepsy without status epilepticus, not intractable [facility])    [facility] [facility] [place], DO    Telemedicine    3 months ago Depression, unspecified depression type    [facility], [facility], [address] Zulma Cable, MD    Telemedicine    7 months ago Other epilepsy without status epilepticus, not intractable [facility], [facility] Jose Eduardo Lorenz Jackson, DO    Telemedicine    7 months ago [address] Mcke[address] MD    Telemedicine    9 months ago Post concussion syndrome    Jasmeet Barrientos MD    Telemedicine

## 2022-02-25 RX ORDER — HYDROCODONE BITARTRATE AND ACETAMINOPHEN 10; 325 MG/1; MG/1
1 TABLET ORAL EVERY 6 HOURS PRN
Qty: 30 TABLET | Refills: 0 | OUTPATIENT
Start: 2022-02-25

## 2022-02-25 RX ORDER — HYDROCODONE BITARTRATE AND ACETAMINOPHEN 10; 325 MG/1; MG/1
1 TABLET ORAL EVERY 6 HOURS PRN
Qty: 30 TABLET | Refills: 0 | Status: SHIPPED | OUTPATIENT
Start: 2022-02-25 | End: 2022-03-02

## 2022-02-25 NOTE — TELEPHONE ENCOUNTER
Please review refill protocol failed/ no protocol  Requested Prescriptions   Pending Prescriptions Disp Refills    HYDROcodone-acetaminophen (NORCO)  MG Oral Tab 30 tablet 0     Sig: Take 1 tablet by mouth every 6 (six) hours as needed for Pain. Medication may causes sedation, constipation. Not to operate heavy machinery or drive on medication.         There is no refill protocol information for this order

## 2022-03-02 RX ORDER — HYDROCODONE BITARTRATE AND ACETAMINOPHEN 10; 325 MG/1; MG/1
1 TABLET ORAL EVERY 6 HOURS PRN
Qty: 30 TABLET | Refills: 0 | Status: SHIPPED | OUTPATIENT
Start: 2022-03-02 | End: 2022-03-08

## 2022-03-02 NOTE — TELEPHONE ENCOUNTER
Message noted: Chart reviewed and may refill medication as requested. Script sent to listed pharmacy by secure method.     Pt notified through [facility]

## 2022-03-09 RX ORDER — HYDROCODONE BITARTRATE AND ACETAMINOPHEN 10; 325 MG/1; MG/1
1 TABLET ORAL EVERY 6 HOURS PRN
Qty: 30 TABLET | Refills: 0 | Status: SHIPPED | OUTPATIENT
Start: 2022-03-09 | End: 2022-03-13

## 2022-03-09 RX ORDER — HYDROCODONE BITARTRATE AND ACETAMINOPHEN 10; 325 MG/1; MG/1
1 TABLET ORAL EVERY 6 HOURS PRN
Qty: 30 TABLET | Refills: 0 | OUTPATIENT
Start: 2022-03-09

## 2022-03-09 NOTE — TELEPHONE ENCOUNTER
Message noted: Chart reviewed and may refill medication as requested. Script sent to listed pharmacy by secure method.     Pt notified through [facility]

## 2022-03-09 NOTE — TELEPHONE ENCOUNTER
Spoke with patient--requesting refill ASAP today, as his grandfather  and is leaving town today before 2 p.m.

## 2022-03-12 RX ORDER — HYDROCODONE BITARTRATE AND ACETAMINOPHEN 10; 325 MG/1; MG/1
1 TABLET ORAL EVERY 6 HOURS PRN
Qty: 30 TABLET | Refills: 0 | OUTPATIENT
Start: 2022-03-12

## 2022-03-15 RX ORDER — HYDROCODONE BITARTRATE AND ACETAMINOPHEN 10; 325 MG/1; MG/1
1 TABLET ORAL EVERY 6 HOURS PRN
Qty: 30 TABLET | Refills: 0 | Status: SHIPPED | OUTPATIENT
Start: 2022-03-15 | End: 2022-03-19

## 2022-03-15 NOTE — TELEPHONE ENCOUNTER
Please review. Protocol failed or does not have a protocol. Requested Prescriptions   Pending Prescriptions Disp Refills    HYDROcodone-acetaminophen (NORCO)  MG Oral Tab 30 tablet 0     Sig: Take 1 tablet by mouth every 6 (six) hours as needed for Pain. Medication may causes sedation, constipation. Not to operate heavy machinery or drive on medication.         There is no refill protocol information for this order           Recent Outpatient Visits              3 months ago Other epilepsy without status epilepticus, not intractable [facility])    [address] [facility], [address], Nuvilex, DO    Telemedicine    4 months ago Depression, unspecified depression type    [facility], [facility], [address], Julia John MD    Telemedicine    8 months ago Other epilepsy without status epilepticus, not intractable [facility], [facility], [address], Nuvilex, DO    Telemedicine    8 months ago [address]ke[address] MD    Telemedicine    9 months ago Post concussion syndrome    Giancarlo Wood MD    Telemedicine

## 2022-03-15 NOTE — TELEPHONE ENCOUNTER
Message noted: Chart reviewed and may refill medication as requested. Script sent to listed pharmacy by secure method.     Pt notified through [facility]

## 2022-03-18 RX ORDER — HYDROCODONE BITARTRATE AND ACETAMINOPHEN 10; 325 MG/1; MG/1
1 TABLET ORAL EVERY 6 HOURS PRN
Qty: 30 TABLET | Refills: 0 | OUTPATIENT
Start: 2022-03-18

## 2022-03-21 RX ORDER — HYDROCODONE BITARTRATE AND ACETAMINOPHEN 10; 325 MG/1; MG/1
1 TABLET ORAL EVERY 6 HOURS PRN
Qty: 30 TABLET | Refills: 0 | Status: SHIPPED | OUTPATIENT
Start: 2022-03-21 | End: 2022-03-26

## 2022-03-25 RX ORDER — HYDROCODONE BITARTRATE AND ACETAMINOPHEN 10; 325 MG/1; MG/1
1 TABLET ORAL EVERY 6 HOURS PRN
Qty: 30 TABLET | Refills: 0 | Status: CANCELLED | OUTPATIENT
Start: 2022-03-25

## 2022-03-28 RX ORDER — HYDROCODONE BITARTRATE AND ACETAMINOPHEN 10; 325 MG/1; MG/1
1 TABLET ORAL EVERY 6 HOURS PRN
Qty: 30 TABLET | Refills: 0 | Status: SHIPPED | OUTPATIENT
Start: 2022-03-28 | End: 2022-04-01

## 2022-03-28 NOTE — TELEPHONE ENCOUNTER
Please review. Protocol failed / No protocol. Requested Prescriptions   Pending Prescriptions Disp Refills    HYDROcodone-acetaminophen (NORCO)  MG Oral Tab 30 tablet 0     Sig: Take 1 tablet by mouth every 6 (six) hours as needed for Pain. Medication may causes sedation, constipation. Not to operate heavy machinery or drive on medication.         There is no refill protocol information for this order          Recent Outpatient Visits              3 months ago Other epilepsy without status epilepticus, not intractable [facility])    [address] [facility], Shan Alvarado DO    Telemedicine    4 months ago Depression, unspecified depression type    [facility], [facility], [address] Hezekiah Abbott, MD    Telemedicine    8 months ago Other epilepsy without status epilepticus, not intractable [place] [address], Shan Alvarado DO    Telemedicine    8 months ago [address] Mcke[address] MD    Telemedicine    10 months ago Post concussion syndrome    Mo Martines MD    Telemedicine

## 2022-03-28 NOTE — TELEPHONE ENCOUNTER
Patient calling to inquire about status of medication refill. Per patient Rx is filled weekly. Patient is out of medication. Routing to provider for consideration.

## 2022-04-02 RX ORDER — HYDROCODONE BITARTRATE AND ACETAMINOPHEN 10; 325 MG/1; MG/1
1 TABLET ORAL EVERY 6 HOURS PRN
Qty: 30 TABLET | Refills: 0 | Status: SHIPPED | OUTPATIENT
Start: 2022-04-02

## 2022-04-02 NOTE — TELEPHONE ENCOUNTER
Message noted: Chart reviewed and may refill medication as requested times one. Prescription sent to listed pharmacy. Pharmacy to notify patient to make appointment for further refills  Pt notified through [facility] also.

## 2022-04-07 RX ORDER — HYDROCODONE BITARTRATE AND ACETAMINOPHEN 10; 325 MG/1; MG/1
1 TABLET ORAL EVERY 6 HOURS PRN
Qty: 30 TABLET | Refills: 0 | Status: SHIPPED | OUTPATIENT
Start: 2022-04-07 | End: 2022-04-11

## 2022-04-07 NOTE — TELEPHONE ENCOUNTER
Message noted: Chart reviewed and may refill medication as requested. Script sent to listed pharmacy by secure method.     Pt notified through [facility]

## 2022-04-07 NOTE — TELEPHONE ENCOUNTER
Please review. Protocol failed/No protocol      Requested Prescriptions   Pending Prescriptions Disp Refills    HYDROcodone-acetaminophen (NORCO)  MG Oral Tab 30 tablet 0     Sig: Take 1 tablet by mouth every 6 (six) hours as needed for Pain. Medication may causes sedation, constipation. Not to operate heavy machinery or drive on medication.         There is no refill protocol information for this order           Recent Outpatient Visits              4 months ago Other epilepsy without status epilepticus, not intractable [facility])    [address] [facility], Shan Alvarado DO    Telemedicine    5 months ago Depression, unspecified depression type    [facility], [facility], [address] Paschal Grade, MD    Telemedicine    8 months ago Other epilepsy without status epilepticus, not intractable [facility])    [facility], [facility], Shan Alvarado DO    Telemedicine    9 months ago [address] Sabina Mckeon MD    Telemedicine    10 months ago Post concussion syndrome    Aakash Cline MD    Telemedicine

## 2022-04-11 RX ORDER — HYDROCODONE BITARTRATE AND ACETAMINOPHEN 10; 325 MG/1; MG/1
1 TABLET ORAL EVERY 6 HOURS PRN
Qty: 30 TABLET | Refills: 0 | Status: SHIPPED | OUTPATIENT
Start: 2022-04-11

## 2022-04-11 NOTE — TELEPHONE ENCOUNTER
Message noted: Chart reviewed and may refill medication as requested. Script sent to listed pharmacy by secure method.     Pt notified through [facility]

## 2022-04-15 NOTE — TELEPHONE ENCOUNTER
Please review; protocol failed/No Protcol    Requested Prescriptions   Pending Prescriptions Disp Refills    HYDROcodone-acetaminophen (NORCO)  MG Oral Tab 30 tablet 0     Sig: Take 1 tablet by mouth every 6 (six) hours as needed for Pain. Medication may causes sedation, constipation. Not to operate heavy machinery or drive on medication.         There is no refill protocol information for this order           Recent Outpatient Visits              4 months ago Other epilepsy without status epilepticus, not intractable [facility])    [address] [facility], Shan Alvarado DO    Telemedicine    5 months ago Depression, unspecified depression type    [facility], [facility], [address] Mikeal Pennant, MD    Telemedicine    9 months ago Other epilepsy without status epilepticus, not intractable [facility])    [facility] [address], Shan Alvarado DO    Telemedicine    9 months ago [address] MD    Telemedicine    10 months ago Post concussion syndrome    Malachi Patel MD    Telemedicine

## 2022-04-16 RX ORDER — HYDROCODONE BITARTRATE AND ACETAMINOPHEN 10; 325 MG/1; MG/1
1 TABLET ORAL EVERY 6 HOURS PRN
Qty: 30 TABLET | Refills: 0 | Status: SHIPPED | OUTPATIENT
Start: 2022-04-16 | End: 2022-04-23

## 2022-04-18 NOTE — TELEPHONE ENCOUNTER
1st attempt - Wanderat message sent for patient to contact the office to schedule an appointment; see notes below.

## 2022-04-25 RX ORDER — HYDROCODONE BITARTRATE AND ACETAMINOPHEN 10; 325 MG/1; MG/1
1 TABLET ORAL EVERY 6 HOURS PRN
Qty: 30 TABLET | Refills: 0 | Status: SHIPPED | OUTPATIENT
Start: 2022-04-25

## 2022-04-25 NOTE — TELEPHONE ENCOUNTER
Message noted: Chart reviewed and may refill medication as requested. Script sent to listed pharmacy by secure method.     Pt notified through [facility]

## 2022-04-29 RX ORDER — HYDROCODONE BITARTRATE AND ACETAMINOPHEN 10; 325 MG/1; MG/1
1 TABLET ORAL EVERY 6 HOURS PRN
Qty: 30 TABLET | Refills: 0 | OUTPATIENT
Start: 2022-04-29

## 2022-04-29 RX ORDER — HYDROCODONE BITARTRATE AND ACETAMINOPHEN 10; 325 MG/1; MG/1
1 TABLET ORAL EVERY 6 HOURS PRN
Qty: 30 TABLET | Refills: 0 | Status: CANCELLED | OUTPATIENT
Start: 2022-04-29

## 2022-04-30 RX ORDER — HYDROCODONE BITARTRATE AND ACETAMINOPHEN 10; 325 MG/1; MG/1
1 TABLET ORAL EVERY 6 HOURS PRN
Qty: 30 TABLET | Refills: 0 | Status: CANCELLED | OUTPATIENT
Start: 2022-04-30

## 2022-05-02 RX ORDER — HYDROCODONE BITARTRATE AND ACETAMINOPHEN 10; 325 MG/1; MG/1
1 TABLET ORAL EVERY 6 HOURS PRN
Qty: 30 TABLET | Refills: 0 | Status: SHIPPED | OUTPATIENT
Start: 2022-05-02

## 2022-05-02 NOTE — TELEPHONE ENCOUNTER
Message noted: Chart reviewed and may refill medication as requested. Script sent to listed pharmacy by secure method.     Pt notified through [facility]

## 2022-05-06 RX ORDER — HYDROCODONE BITARTRATE AND ACETAMINOPHEN 10; 325 MG/1; MG/1
1 TABLET ORAL EVERY 6 HOURS PRN
Qty: 30 TABLET | Refills: 0 | Status: SHIPPED | OUTPATIENT
Start: 2022-05-06

## 2022-05-13 RX ORDER — HYDROCODONE BITARTRATE AND ACETAMINOPHEN 10; 325 MG/1; MG/1
1 TABLET ORAL EVERY 6 HOURS PRN
Qty: 30 TABLET | Refills: 0 | Status: SHIPPED | OUTPATIENT
Start: 2022-05-13 | End: 2022-05-16

## 2022-05-13 NOTE — TELEPHONE ENCOUNTER
Message noted: Chart reviewed and may refill medication as requested. Script sent to listed pharmacy by secure method.     Pt notified through [facility]

## 2022-05-16 ENCOUNTER — TELEMEDICINE (OUTPATIENT)
Dept: FAMILY MEDICINE CLINIC | Facility: CLINIC | Age: 27
End: 2022-05-16

## 2022-05-16 ENCOUNTER — PATIENT MESSAGE (OUTPATIENT)
Dept: FAMILY MEDICINE CLINIC | Facility: CLINIC | Age: 27
End: 2022-05-16

## 2022-05-16 DIAGNOSIS — G89.29 CHRONIC NONINTRACTABLE HEADACHE, UNSPECIFIED HEADACHE TYPE: Primary | ICD-10-CM

## 2022-05-16 DIAGNOSIS — R51.9 CHRONIC NONINTRACTABLE HEADACHE, UNSPECIFIED HEADACHE TYPE: Primary | ICD-10-CM

## 2022-05-16 RX ORDER — HYDROCODONE BITARTRATE AND ACETAMINOPHEN 10; 325 MG/1; MG/1
1 TABLET ORAL EVERY 6 HOURS PRN
Qty: 30 TABLET | Refills: 0 | Status: SHIPPED | OUTPATIENT
Start: 2022-05-16

## 2022-05-26 RX ORDER — HYDROCODONE BITARTRATE AND ACETAMINOPHEN 10; 325 MG/1; MG/1
1 TABLET ORAL EVERY 6 HOURS PRN
Qty: 30 TABLET | Refills: 0 | Status: SHIPPED | OUTPATIENT
Start: 2022-05-26 | End: 2022-05-30

## 2022-05-26 NOTE — TELEPHONE ENCOUNTER
Message noted: Chart reviewed and may refill medication as requested. Script sent to listed pharmacy by secure method.     Pt notified through [facility]

## 2022-05-30 RX ORDER — HYDROCODONE BITARTRATE AND ACETAMINOPHEN 10; 325 MG/1; MG/1
1 TABLET ORAL EVERY 6 HOURS PRN
Qty: 30 TABLET | Refills: 0 | Status: SHIPPED | OUTPATIENT
Start: 2022-05-30

## 2022-05-30 NOTE — TELEPHONE ENCOUNTER
Please review. Protocol failed or does not have a protocol. Requested Prescriptions   Pending Prescriptions Disp Refills    HYDROcodone-acetaminophen (NORCO)  MG Oral Tab 30 tablet 0     Sig: Take 1 tablet by mouth every 6 (six) hours as needed for Pain. Medication may causes sedation, constipation. Not to operate heavy machinery or drive on medication.         There is no refill protocol information for this order           Recent Outpatient Visits              2 weeks ago Chronic nonintractable headache, unspecified headache type    [facility], [address] MD    Telemedicine    6 months ago Other epilepsy without status epilepticus, not intractable [facility], [address] [place] Jackson, DO    Telemedicine    6 months ago Depression, unspecified depression type    [facility], [address] MD    Telemedicine    10 months ago Other epilepsy without status epilepticus, not intractable [facility], [address] [place], DO    Telemedicine    10 months ago Olivier Lees, Lucetta Ahumada, MD    Telemedicine

## 2022-06-04 RX ORDER — HYDROCODONE BITARTRATE AND ACETAMINOPHEN 10; 325 MG/1; MG/1
1 TABLET ORAL EVERY 6 HOURS PRN
Qty: 30 TABLET | Refills: 0 | Status: SHIPPED | OUTPATIENT
Start: 2022-06-04

## 2022-06-04 NOTE — TELEPHONE ENCOUNTER
See patient'e message,this is a  weekly prescription. Please review; protocol failed/no protocol. Requested Prescriptions   Pending Prescriptions Disp Refills    HYDROcodone-acetaminophen (NORCO)  MG Oral Tab 30 tablet 0     Sig: Take 1 tablet by mouth every 6 (six) hours as needed for Pain. Medication may causes sedation, constipation. Not to operate heavy machinery or drive on medication.         There is no refill protocol information for this order            Recent Outpatient Visits              2 weeks ago Chronic nonintractable headache, unspecified headache type    [address] [place], MD    Telemedicine    6 months ago Other epilepsy without status epilepticus, not intractable [facility])    [address] [facility] Shan Alvarado DO    Telemedicine    7 months ago Depression, unspecified depression type    [address] [place], MD    Telemedicine    10 months ago Other epilepsy without status epilepticus, not intractable [facility])    [place] [address], Shan Alvarado DO    Telemedicine    10 months ago Erlinda Santillan MD    Telemedicine

## 2022-06-04 NOTE — TELEPHONE ENCOUNTER
see medication record, was [facility] to [facility] on 5/30/22. MyChart sent.

## 2022-06-04 NOTE — TELEPHONE ENCOUNTER
Message noted: Chart reviewed and may refill medication as requested. Script sent to listed pharmacy by secure method.     Pt notified through [facility]

## 2022-06-08 RX ORDER — HYDROCODONE BITARTRATE AND ACETAMINOPHEN 10; 325 MG/1; MG/1
1 TABLET ORAL EVERY 6 HOURS PRN
Qty: 30 TABLET | Refills: 0 | OUTPATIENT
Start: 2022-06-08

## 2022-06-09 RX ORDER — HYDROCODONE BITARTRATE AND ACETAMINOPHEN 10; 325 MG/1; MG/1
1 TABLET ORAL EVERY 6 HOURS PRN
Qty: 30 TABLET | Refills: 0 | Status: SHIPPED | OUTPATIENT
Start: 2022-06-09

## 2022-06-14 RX ORDER — HYDROCODONE BITARTRATE AND ACETAMINOPHEN 10; 325 MG/1; MG/1
1 TABLET ORAL EVERY 6 HOURS PRN
Qty: 30 TABLET | Refills: 0 | Status: SHIPPED | OUTPATIENT
Start: 2022-06-14

## 2022-06-21 RX ORDER — HYDROCODONE BITARTRATE AND ACETAMINOPHEN 10; 325 MG/1; MG/1
1 TABLET ORAL EVERY 6 HOURS PRN
Qty: 30 TABLET | Refills: 0 | Status: SHIPPED | OUTPATIENT
Start: 2022-06-21 | End: 2022-06-25

## 2022-06-21 NOTE — TELEPHONE ENCOUNTER
Message noted: Chart reviewed and may refill medication as requested. Script sent to listed pharmacy by secure method.     Pt notified through [facility]

## 2022-06-27 RX ORDER — HYDROCODONE BITARTRATE AND ACETAMINOPHEN 10; 325 MG/1; MG/1
1 TABLET ORAL EVERY 6 HOURS PRN
Qty: 30 TABLET | Refills: 0 | Status: SHIPPED | OUTPATIENT
Start: 2022-06-27

## 2022-06-27 NOTE — TELEPHONE ENCOUNTER
Message noted: Chart reviewed and may refill medication as requested. Script sent to listed pharmacy by secure method.     Pt notified through [facility]

## 2022-07-05 RX ORDER — HYDROCODONE BITARTRATE AND ACETAMINOPHEN 10; 325 MG/1; MG/1
1 TABLET ORAL EVERY 6 HOURS PRN
Qty: 30 TABLET | Refills: 0 | Status: SHIPPED | OUTPATIENT
Start: 2022-07-05 | End: 2022-07-10

## 2022-07-05 NOTE — TELEPHONE ENCOUNTER
Message noted: Chart reviewed and may refill medication as requested. Script sent to listed pharmacy by secure method.     Pt notified through [facility]

## 2022-07-11 RX ORDER — HYDROCODONE BITARTRATE AND ACETAMINOPHEN 10; 325 MG/1; MG/1
1 TABLET ORAL EVERY 6 HOURS PRN
Qty: 30 TABLET | Refills: 0 | Status: SHIPPED | OUTPATIENT
Start: 2022-07-11

## 2022-07-11 NOTE — TELEPHONE ENCOUNTER
Message noted: Chart reviewed and may refill medication as requested to requested pharmacy in [place]. Script sent to pharmacy by secure method- hopefully they can fill. Pt can follow up to discuss follow up.

## 2022-07-11 NOTE — TELEPHONE ENCOUNTER
Call center please call and schedule an appointment. Thank You. Movie[employer] message sent to the patient.         Authorized by: Caroline Walden MD     Non-formulary    To pharmacy: Needs appointment for further refills

## 2022-07-18 RX ORDER — HYDROCODONE BITARTRATE AND ACETAMINOPHEN 10; 325 MG/1; MG/1
1 TABLET ORAL EVERY 6 HOURS PRN
Qty: 30 TABLET | Refills: 0 | OUTPATIENT
Start: 2022-07-18

## 2022-07-18 NOTE — TELEPHONE ENCOUNTER
Left message to call back for appt, advised in msg (verified by name) routine appts are required for monitoring while on prescribed medication.

## 2022-07-20 RX ORDER — HYDROCODONE BITARTRATE AND ACETAMINOPHEN 10; 325 MG/1; MG/1
1 TABLET ORAL EVERY 6 HOURS PRN
Qty: 30 TABLET | Refills: 0 | Status: CANCELLED | OUTPATIENT
Start: 2022-07-20

## 2022-07-21 RX ORDER — HYDROCODONE BITARTRATE AND ACETAMINOPHEN 10; 325 MG/1; MG/1
1 TABLET ORAL EVERY 6 HOURS PRN
Qty: 30 TABLET | Refills: 0 | Status: SHIPPED | OUTPATIENT
Start: 2022-07-21

## 2022-07-26 RX ORDER — HYDROCODONE BITARTRATE AND ACETAMINOPHEN 10; 325 MG/1; MG/1
1 TABLET ORAL EVERY 6 HOURS PRN
Qty: 30 TABLET | Refills: 0 | Status: SHIPPED | OUTPATIENT
Start: 2022-07-26 | End: 2022-07-31

## 2022-07-26 NOTE — TELEPHONE ENCOUNTER
Message noted: Chart reviewed and may refill medication as requested. Script sent to listed pharmacy by secure method.     Pt notified through [facility][note 2]

## 2022-07-28 ENCOUNTER — PATIENT MESSAGE (OUTPATIENT)
Dept: ADMINISTRATIVE | Age: 27
End: 2022-07-28

## 2022-07-28 RX ORDER — LEVETIRACETAM 750 MG/1
750 TABLET ORAL 2 TIMES DAILY
Qty: 180 TABLET | Refills: 0 | Status: SHIPPED | OUTPATIENT
Start: 2022-07-28

## 2022-07-28 NOTE — TELEPHONE ENCOUNTER
Message noted: Chart reviewed and may refill medication as requested times one. Prescription sent to listed pharmacy. Should follow up with neurology for future refills and follow up.

## 2022-07-28 NOTE — TELEPHONE ENCOUNTER
Patient states he was seen in the office 7/28/22 and  mentioned he would refill his Kepra. Patient states he has not seen Polina Abel since May of 2021. Patient would like medication sent to [facility] [place] in [facility]. refill pended.

## 2022-08-01 RX ORDER — HYDROCODONE BITARTRATE AND ACETAMINOPHEN 10; 325 MG/1; MG/1
1 TABLET ORAL EVERY 6 HOURS PRN
Qty: 30 TABLET | Refills: 0 | Status: SHIPPED | OUTPATIENT
Start: 2022-08-01

## 2022-08-04 RX ORDER — HYDROCODONE BITARTRATE AND ACETAMINOPHEN 10; 325 MG/1; MG/1
1 TABLET ORAL EVERY 6 HOURS PRN
Qty: 30 TABLET | Refills: 0 | Status: SHIPPED | OUTPATIENT
Start: 2022-08-04

## 2022-08-04 RX ORDER — DEXTROAMPHETAMINE SACCHARATE, AMPHETAMINE ASPARTATE, DEXTROAMPHETAMINE SULFATE AND AMPHETAMINE SULFATE 7.5; 7.5; 7.5; 7.5 MG/1; MG/1; MG/1; MG/1
30 TABLET ORAL 2 TIMES DAILY
Qty: 28 TABLET | Refills: 0 | Status: SHIPPED | OUTPATIENT
Start: 2022-08-04

## 2022-08-04 NOTE — TELEPHONE ENCOUNTER
Message noted: Chart reviewed and may refill medication as requested time this once. Prescription sent to listed pharmacy. Please notify patient.  Further refills as per his psychiatrist.

## 2022-08-04 NOTE — TELEPHONE ENCOUNTER
Message noted. May refill norco as requested this time. Erx sent to listed pharmacy.  To follow up for appointment if not better; Please notify patient
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
## 2022-08-04 NOTE — TELEPHONE ENCOUNTER
Patient states the pharmacy needs Dr. Manda Hoskins to call in order to fill prescriptions early.  [place] [phone]

## 2022-08-04 NOTE — TELEPHONE ENCOUNTER
I called the pharmacy and early refill was order was given. I left a message on the patient personal answering machine.

## 2022-08-05 NOTE — TELEPHONE ENCOUNTER
Patient is requesting that I call the pharmacy in [facility] as he called the pharmacy and they only refilled the [address] but not the Adderall. I called the pharmacy and spoke with Trey Sr who will fill the Adderall and will contact his insurance and the patient.

## 2022-08-09 RX ORDER — HYDROCODONE BITARTRATE AND ACETAMINOPHEN 10; 325 MG/1; MG/1
1 TABLET ORAL EVERY 6 HOURS PRN
Qty: 30 TABLET | Refills: 0 | Status: SHIPPED | OUTPATIENT
Start: 2022-08-09 | End: 2022-08-15

## 2022-08-09 NOTE — TELEPHONE ENCOUNTER
Message noted: Chart reviewed and may refill medication as requested. Script sent to listed pharmacy by secure method.     Pt notified through [facility]

## 2022-08-10 ENCOUNTER — NURSE TRIAGE (OUTPATIENT)
Dept: FAMILY MEDICINE CLINIC | Facility: CLINIC | Age: 27
End: 2022-08-10

## 2022-08-10 NOTE — TELEPHONE ENCOUNTER
Message noted and letter generated as requested. Sent to patient via [facility]. Pt notified.

## 2022-08-15 ENCOUNTER — OFFICE VISIT (OUTPATIENT)
Dept: FAMILY MEDICINE CLINIC | Facility: CLINIC | Age: 27
End: 2022-08-15
Payer: MEDICAID

## 2022-08-15 VITALS
DIASTOLIC BLOOD PRESSURE: 71 MMHG | HEART RATE: 70 BPM | HEIGHT: 74 IN | SYSTOLIC BLOOD PRESSURE: 116 MMHG | WEIGHT: 189 LBS | BODY MASS INDEX: 24.26 KG/M2

## 2022-08-15 DIAGNOSIS — M54.9 DORSALGIA: Primary | ICD-10-CM

## 2022-08-15 PROCEDURE — 3008F BODY MASS INDEX DOCD: CPT | Performed by: FAMILY MEDICINE

## 2022-08-15 PROCEDURE — 99213 OFFICE O/P EST LOW 20 MIN: CPT | Performed by: FAMILY MEDICINE

## 2022-08-15 PROCEDURE — 3074F SYST BP LT 130 MM HG: CPT | Performed by: FAMILY MEDICINE

## 2022-08-15 PROCEDURE — 3078F DIAST BP <80 MM HG: CPT | Performed by: FAMILY MEDICINE

## 2022-08-15 RX ORDER — HYDROCODONE BITARTRATE AND ACETAMINOPHEN 10; 325 MG/1; MG/1
1 TABLET ORAL EVERY 6 HOURS PRN
Qty: 30 TABLET | Refills: 0 | Status: CANCELLED | OUTPATIENT
Start: 2022-08-15

## 2022-08-15 RX ORDER — HYDROCODONE BITARTRATE AND ACETAMINOPHEN 10; 325 MG/1; MG/1
1 TABLET ORAL EVERY 6 HOURS PRN
Qty: 30 TABLET | Refills: 0 | Status: SHIPPED | OUTPATIENT
Start: 2022-08-15

## 2022-08-18 ENCOUNTER — NURSE TRIAGE (OUTPATIENT)
Dept: FAMILY MEDICINE CLINIC | Facility: CLINIC | Age: 27
End: 2022-08-18

## 2022-08-18 LAB — AMB EXT COVID-19 RESULT: DETECTED

## 2022-08-23 RX ORDER — HYDROCODONE BITARTRATE AND ACETAMINOPHEN 10; 325 MG/1; MG/1
1 TABLET ORAL EVERY 6 HOURS PRN
Qty: 30 TABLET | Refills: 0 | Status: SHIPPED | OUTPATIENT
Start: 2022-08-23 | End: 2022-08-27

## 2022-08-29 RX ORDER — HYDROCODONE BITARTRATE AND ACETAMINOPHEN 10; 325 MG/1; MG/1
1 TABLET ORAL EVERY 6 HOURS PRN
Qty: 30 TABLET | Refills: 0 | Status: SHIPPED | OUTPATIENT
Start: 2022-08-29

## 2022-08-29 NOTE — TELEPHONE ENCOUNTER
Message noted: Chart reviewed and may refill medication as requested. Script sent to listed pharmacy by secure method.     Pt notified through [facility]

## 2022-09-04 NOTE — TELEPHONE ENCOUNTER
Please review. Protocol failed / No protocol. Requested Prescriptions   Pending Prescriptions Disp Refills    HYDROcodone-acetaminophen (NORCO)  MG Oral Tab 30 tablet 0     Sig: Take 1 tablet by mouth every 6 (six) hours as needed for Pain. Medication may causes sedation, constipation. Not to operate heavy machinery or drive on medication.         There is no refill protocol information for this order             Recent Outpatient Visits              2 weeks ago [address] [facility], [facility] Nirav Mccord MD    Office Visit    1 month ago Attention deficit disorder, unspecified hyperactivity presence    Sasha Isaacs MD    Office Visit    3 months ago Chronic nonintractable headache, unspecified headache type    [address] [facility], [address] [place] [address] Nirav Luna MD    Telemedicine    9 months ago Other epilepsy without status epilepticus, not intractable [facility])    [address], [address], [address], DO Shan    Telemedicine    10 months ago Depression, unspecified depression type    Sasha Isaacs MD    Telemedicine

## 2022-09-05 RX ORDER — HYDROCODONE BITARTRATE AND ACETAMINOPHEN 10; 325 MG/1; MG/1
1 TABLET ORAL EVERY 6 HOURS PRN
Qty: 30 TABLET | Refills: 0 | OUTPATIENT
Start: 2022-09-05

## 2022-09-05 RX ORDER — HYDROCODONE BITARTRATE AND ACETAMINOPHEN 10; 325 MG/1; MG/1
1 TABLET ORAL EVERY 6 HOURS PRN
Qty: 30 TABLET | Refills: 0 | Status: SHIPPED | OUTPATIENT
Start: 2022-09-05

## 2022-09-12 RX ORDER — HYDROCODONE BITARTRATE AND ACETAMINOPHEN 10; 325 MG/1; MG/1
1 TABLET ORAL EVERY 6 HOURS PRN
Qty: 30 TABLET | Refills: 0 | Status: SHIPPED | OUTPATIENT
Start: 2022-09-12

## 2022-09-12 NOTE — TELEPHONE ENCOUNTER
Protocol failed or has No Protocol, please review    Last refill = 9/5    Requested Prescriptions   Pending Prescriptions Disp Refills    HYDROcodone-acetaminophen (NORCO)  MG Oral Tab 30 tablet 0     Sig: Take 1 tablet by mouth every 6 (six) hours as needed for Pain. Medication may causes sedation, constipation. Not to operate heavy machinery or drive on medication.         There is no refill protocol information for this order           Recent Outpatient Visits              4 weeks ago [facility], [facility] Rogelio Mccord MD    Office Visit    1 month ago Attention deficit disorder, unspecified hyperactivity presence    Roman Roblero MD    Office Visit    3 months ago Chronic nonintractable headache, unspecified headache type    [facility], [address] Rogel[address]ord MD    Telemedicine    9 months ago Other epilepsy without status epilepticus, not intractable [facility])    [facility] [address], DO Shan    Telemedicine    10 months ago Depression, unspecified depression type    Roman Roblero MD    Telemedicine

## 2022-09-19 RX ORDER — HYDROCODONE BITARTRATE AND ACETAMINOPHEN 10; 325 MG/1; MG/1
1 TABLET ORAL EVERY 6 HOURS PRN
Qty: 30 TABLET | Refills: 0 | Status: SHIPPED | OUTPATIENT
Start: 2022-09-19

## 2022-09-19 NOTE — TELEPHONE ENCOUNTER
Message noted: Chart reviewed and may refill medication as requested. Script sent to listed pharmacy by secure method.     Pt notified through [facility]

## 2022-09-24 RX ORDER — HYDROCODONE BITARTRATE AND ACETAMINOPHEN 10; 325 MG/1; MG/1
1 TABLET ORAL EVERY 6 HOURS PRN
Qty: 30 TABLET | Refills: 0 | Status: SHIPPED | OUTPATIENT
Start: 2022-09-24

## 2022-09-24 NOTE — TELEPHONE ENCOUNTER
Please review. Protocol failed/ No protocol      Requested Prescriptions   Pending Prescriptions Disp Refills    HYDROcodone-acetaminophen (NORCO)  MG Oral Tab 30 tablet 0     Sig: Take 1 tablet by mouth every 6 (six) hours as needed for Pain. Medication may causes sedation, constipation. Not to operate heavy machinery or drive on medication.         There is no refill protocol information for this order                Recent Outpatient Visits              1 month ago [address] [facility], Myaco Slade MD    Office Visit    2 months ago Attention deficit disorder, unspecified hyperactivity presence    Yaritza Soares MD    Office Visit    4 months ago Chronic nonintractable headache, unspecified headache type    [facility], [facility], [address] [facility], Falguni Leonardo MD    Telemedicine    9 months ago Other epilepsy without status epilepticus, not intractable [facility], [address], [address], DO Shan    Telemedicine    10 months ago Depression, unspecified depression type    Yaritza Soares MD    Telemedicine

## 2022-09-24 NOTE — TELEPHONE ENCOUNTER
Message noted: Chart reviewed and may refill medication as requested. Script sent to listed pharmacy by secure method.     Pt notified through [facility]

## 2022-10-02 NOTE — TELEPHONE ENCOUNTER
Please review. Protocol Failed or has No Protocol. Requested Prescriptions   Pending Prescriptions Disp Refills    HYDROcodone-acetaminophen (NORCO)  MG Oral Tab 30 tablet 0     Sig: Take 1 tablet by mouth every 6 (six) hours as needed for Pain. Medication may causes sedation, constipation. Not to operate heavy machinery or drive on medication.         There is no refill protocol information for this order               Recent Outpatient Visits              1 month ago [address] [facility], Serenity Christensen MD    Office Visit    2 months ago Attention deficit disorder, unspecified hyperactivity presence    Yang Galo MD    Office Visit    4 months ago Chronic nonintractable headache, unspecified headache type    [facility], [facility], [facility]    [facility]icine    10 months ago Other epilepsy without status epilepticus, not intractable [facility], [facility], [address], DO Shan    Telemedicine    11 months ago Depression, unspecified depression type    Yang Galo MD    Telemedicine

## 2022-10-03 RX ORDER — HYDROCODONE BITARTRATE AND ACETAMINOPHEN 10; 325 MG/1; MG/1
1 TABLET ORAL EVERY 6 HOURS PRN
Qty: 30 TABLET | Refills: 0 | Status: SHIPPED | OUTPATIENT
Start: 2022-10-03

## 2022-10-03 NOTE — TELEPHONE ENCOUNTER
Message noted: Chart reviewed and may refill medication as requested. Script sent to listed pharmacy by secure method.     Pt notified through [facility]

## 2022-10-08 RX ORDER — HYDROCODONE BITARTRATE AND ACETAMINOPHEN 10; 325 MG/1; MG/1
1 TABLET ORAL EVERY 6 HOURS PRN
Qty: 30 TABLET | Refills: 0 | Status: SHIPPED | OUTPATIENT
Start: 2022-10-08

## 2022-10-08 NOTE — TELEPHONE ENCOUNTER
Message noted: Chart reviewed and may refill medication as requested. Script sent to listed pharmacy by secure method.     Pt notified through [facility]

## 2022-10-13 RX ORDER — HYDROCODONE BITARTRATE AND ACETAMINOPHEN 10; 325 MG/1; MG/1
1 TABLET ORAL EVERY 6 HOURS PRN
Qty: 30 TABLET | Refills: 0 | Status: SHIPPED | OUTPATIENT
Start: 2022-10-13

## 2022-10-13 NOTE — TELEPHONE ENCOUNTER
Message noted: Chart reviewed and may refill medication as requested. Script sent to listed pharmacy by secure method.     Pt notified through [facility]

## 2022-10-14 ENCOUNTER — TELEPHONE (OUTPATIENT)
Dept: FAMILY MEDICINE CLINIC | Facility: CLINIC | Age: 27
End: 2022-10-14

## 2022-10-14 NOTE — TELEPHONE ENCOUNTER
Message noted; not inclined to prescribe more then was is prescribed.  Have been encouraging pt to see neurology and lessen usage of pain medication

## 2022-10-14 NOTE — TELEPHONE ENCOUNTER
Polo Kim pharmacist stated that patient is getting a 7 days supply of hydrocodone but he is consistently trying to fill it on day 5. Wanted to verify if doctor was aware and if wanted to prescribe more than just a 7 days supply? Please advise.

## 2022-10-15 ENCOUNTER — TELEPHONE (OUTPATIENT)
Dept: FAMILY MEDICINE CLINIC | Facility: CLINIC | Age: 27
End: 2022-10-15

## 2022-10-15 NOTE — TELEPHONE ENCOUNTER
Call [address] or In-person appt needed. Reason: discuss Norco Use and Early Refills. Please assist, thank you! RN called pharmacy. Patient's date of birth and full name both confirmed. RN informed of provider's message as detailed below. He verbalizes understanding of all information, and agreeable to plan. Pharmacy says they Will hold refills to-the-day, moving forward. [facility] will no longer refill early.

## 2022-10-15 NOTE — TELEPHONE ENCOUNTER
Dr. Ashley Puentes -- [facility] wants to verify a few things: For [place] Rx Sent  1. Diagnosis Code  2. Make sure you are aware patient has h/o filling [place] early. 3. Wants to make sure you know that this went to [facility] (Although RN ensured him that you review those details before sending controlled substance)      Pharamcy calling office. Transferred to nurse. Spoke with Mariella Roland, Pharmacist.   Wants to verify Norco Rx. Concerned that this Rx was sent 2 hours away from [facility].   And other concerns listed above.

## 2022-10-15 NOTE — TELEPHONE ENCOUNTER
Message noted. Can provide diagnosis for script: Chronic headache. Contact pt to make follow up appointment to discuss norco use for early refills. Can make virtual visit to discuss.

## 2022-10-19 NOTE — TELEPHONE ENCOUNTER
2nd attempt/left voice mail message for the patient to call back.  Also, my chart message was sent to the patient to schedule an appointment per the below request.

## 2022-10-25 RX ORDER — HYDROCODONE BITARTRATE AND ACETAMINOPHEN 10; 325 MG/1; MG/1
1 TABLET ORAL EVERY 6 HOURS PRN
Qty: 30 TABLET | Refills: 0 | Status: SHIPPED | OUTPATIENT
Start: 2022-10-25

## 2022-11-13 NOTE — TELEPHONE ENCOUNTER
Message noted: Chart reviewed and may refill medication as requested. Script sent to listed pharmacy by secure method.     Pt notified through [facility]

## 2022-11-13 NOTE — TELEPHONE ENCOUNTER
Controlled medication pending for review. If approved needs to be called in or faxed by on-site staff.     Last Rx: 10/25/22  LOV: 08/15/22

## 2022-11-14 RX ORDER — HYDROCODONE BITARTRATE AND ACETAMINOPHEN 10; 325 MG/1; MG/1
1 TABLET ORAL EVERY 6 HOURS PRN
Qty: 30 TABLET | Refills: 0 | Status: SHIPPED | OUTPATIENT
Start: 2022-11-14

## 2022-11-14 NOTE — TELEPHONE ENCOUNTER
Message noted: Chart reviewed and may refill medication as requested times one. Prescription sent to listed pharmacy. Please notify patient that if he is living in [place] as I seen that pharmacy is in [place], he needs to find another provider there to future refills.

## 2022-11-21 ENCOUNTER — TELEPHONE (OUTPATIENT)
Dept: FAMILY MEDICINE CLINIC | Facility: CLINIC | Age: 27
End: 2022-11-21

## 2022-11-21 RX ORDER — HYDROCODONE BITARTRATE AND ACETAMINOPHEN 10; 325 MG/1; MG/1
1 TABLET ORAL EVERY 6 HOURS PRN
Qty: 30 TABLET | Refills: 0 | Status: CANCELLED | OUTPATIENT
Start: 2022-11-21

## 2022-11-21 RX ORDER — HYDROCODONE BITARTRATE AND ACETAMINOPHEN 10; 325 MG/1; MG/1
1 TABLET ORAL EVERY 6 HOURS PRN
Qty: 30 TABLET | Refills: 0 | Status: SHIPPED | OUTPATIENT
Start: 2022-11-21

## 2022-11-21 NOTE — TELEPHONE ENCOUNTER
Patient called. Upset because I informed him he needs to find provider in [place]. He states he doesn't live in [place] and stays with grandmother in [place]. Yet, his last rx was sent in [place]. I ended call. He needs to find provider in [place].

## 2022-11-21 NOTE — TELEPHONE ENCOUNTER
Message noted: Chart reviewed and may refill medication as requested to pharmacy in [place]. Explained that since norco in controlled substance and as I am not practicing in [place] or licensed there that this is problematic to send scripts to pharmacy in [address]. Patient verbalized understanding.

## 2022-11-21 NOTE — TELEPHONE ENCOUNTER
Talked with patient. He has already talked with Dr. Kb Barron regarding the issue of sending controlled substances to other states, as well as treating patients in other states. Patient reports feeling sleep deprived and with headache due to lack of sleep. [place] resident [place] goes to [place] frequently to care for his elderly grandmother. He has several ongoing issues with seizures and headaches that are managed by Dr. Seth Schwartz and Dr Kb Barron. Patient is satisfied with this call.

## 2022-11-28 RX ORDER — HYDROCODONE BITARTRATE AND ACETAMINOPHEN 10; 325 MG/1; MG/1
1 TABLET ORAL EVERY 6 HOURS PRN
Qty: 30 TABLET | Refills: 0 | Status: SHIPPED | OUTPATIENT
Start: 2022-11-28

## 2022-12-03 RX ORDER — HYDROCODONE BITARTRATE AND ACETAMINOPHEN 10; 325 MG/1; MG/1
1 TABLET ORAL EVERY 6 HOURS PRN
Qty: 30 TABLET | Refills: 0 | Status: SHIPPED | OUTPATIENT
Start: 2022-12-03

## 2022-12-05 ENCOUNTER — TELEMEDICINE (OUTPATIENT)
Dept: FAMILY MEDICINE CLINIC | Facility: CLINIC | Age: 27
End: 2022-12-05
Payer: MEDICAID

## 2022-12-05 ENCOUNTER — TELEPHONE (OUTPATIENT)
Dept: FAMILY MEDICINE CLINIC | Facility: CLINIC | Age: 27
End: 2022-12-05

## 2022-12-05 DIAGNOSIS — G40.802 OTHER EPILEPSY WITHOUT STATUS EPILEPTICUS, NOT INTRACTABLE (HCC): Primary | ICD-10-CM

## 2022-12-05 DIAGNOSIS — R51.9 CHRONIC NONINTRACTABLE HEADACHE, UNSPECIFIED HEADACHE TYPE: ICD-10-CM

## 2022-12-05 DIAGNOSIS — G89.29 CHRONIC NONINTRACTABLE HEADACHE, UNSPECIFIED HEADACHE TYPE: ICD-10-CM

## 2022-12-05 NOTE — TELEPHONE ENCOUNTER
Message noted and letter generated as requested. Sent to patient via [facility]. Pt notified.

## 2022-12-05 NOTE — TELEPHONE ENCOUNTER
Pt stated he had a seizure 2 weeks ago, on seizure medication, broke molar on right side, no swelling but have pain since Wednesday   Unable to get a dental appt to be extracted  Requesting letter excusing from work  11/30/22, 12/1/22 and today 12/5/22, RTW 12/6/22    Taking ibuprofen Sandraell High for pain      Ok to send letter

## 2022-12-08 RX ORDER — HYDROCODONE BITARTRATE AND ACETAMINOPHEN 10; 325 MG/1; MG/1
1 TABLET ORAL EVERY 6 HOURS PRN
Qty: 30 TABLET | Refills: 0 | Status: SHIPPED | OUTPATIENT
Start: 2022-12-08 | End: 2022-12-12

## 2022-12-12 RX ORDER — HYDROCODONE BITARTRATE AND ACETAMINOPHEN 10; 325 MG/1; MG/1
1 TABLET ORAL EVERY 6 HOURS PRN
Qty: 30 TABLET | Refills: 0 | Status: SHIPPED | OUTPATIENT
Start: 2022-12-12

## 2022-12-19 RX ORDER — HYDROCODONE BITARTRATE AND ACETAMINOPHEN 10; 325 MG/1; MG/1
1 TABLET ORAL EVERY 6 HOURS PRN
Qty: 30 TABLET | Refills: 0 | Status: SHIPPED | OUTPATIENT
Start: 2022-12-19

## 2022-12-23 RX ORDER — HYDROCODONE BITARTRATE AND ACETAMINOPHEN 10; 325 MG/1; MG/1
1 TABLET ORAL EVERY 6 HOURS PRN
Qty: 30 TABLET | Refills: 0 | OUTPATIENT
Start: 2022-12-23

## 2022-12-26 RX ORDER — HYDROCODONE BITARTRATE AND ACETAMINOPHEN 10; 325 MG/1; MG/1
1 TABLET ORAL EVERY 6 HOURS PRN
Qty: 30 TABLET | Refills: 0 | Status: SHIPPED | OUTPATIENT
Start: 2022-12-26

## 2022-12-26 NOTE — TELEPHONE ENCOUNTER
Please review. Protocol failed / No protocol. Requested Prescriptions   Pending Prescriptions Disp Refills    HYDROcodone-acetaminophen (NORCO)  MG Oral Tab 30 tablet 0     Sig: Take 1 tablet by mouth every 6 (six) hours as needed for Pain. Medication may causes sedation, constipation. Not to operate heavy machinery or drive on medication.        There is no refill protocol information for this order            Recent Outpatient Visits              3 weeks ago Other epilepsy without status epilepticus, not intractable [facility])    [address], [address] Jennifer Mccord MD    Telemedicine    4 months ago Stevenson Sanchez, [address] Jennifer Mccord MD    Office Visit    5 months ago Attention deficit disorder, unspecified hyperactivity presence    Jose Mccarty MD    Office Visit    7 months ago Chronic nonintractable headache, unspecified headache type    Jose Mccarty MD    Telemedicine    1 year ago Other epilepsy without status epilepticus, not intractable [facility])    [facility] [address], [address], [place], [place]    Telemedicine

## 2023-01-01 NOTE — TELEPHONE ENCOUNTER
Please review; protocol failed. Or has no protocol. Per medication record pt is using 30 tablets every 6-7 days. Please advise. Requested Prescriptions   Pending Prescriptions Disp Refills    HYDROcodone-acetaminophen (NORCO)  MG Oral Tab 30 tablet 0     Sig: Take 1 tablet by mouth every 6 (six) hours as needed for Pain. Medication may causes sedation, constipation. Not to operate heavy machinery or drive on medication.        There is no refill protocol information for this order           Recent Outpatient Visits              3 weeks ago Other epilepsy without status epilepticus, not intractable [facility])    [address], [address] Karlie Mccord MD    Telemedicine    4 months ago Stevenson Sanchez, [address] Karlie Mccord MD    Office Visit    5 months ago Attention deficit disorder, unspecified hyperactivity presence    Terri Keenan MD    Office Visit    7 months ago Chronic nonintractable headache, unspecified headache type    Terri Keenan MD    Telemedicine    1 year ago Other epilepsy without status epilepticus, not intractable [facility])    [address], [address], [place], [place]    Telemedicine

## 2023-01-01 NOTE — TELEPHONE ENCOUNTER
Per medication record pt is taking 30 tablets of norco every 6-7 days, please advise. Requested Prescriptions   Pending Prescriptions Disp Refills    HYDROcodone-acetaminophen (NORCO)  MG Oral Tab 30 tablet 0     Sig: Take 1 tablet by mouth every 6 (six) hours as needed for Pain. Medication may causes sedation, constipation. Not to operate heavy machinery or drive on medication.        There is no refill protocol information for this order           Recent Outpatient Visits              3 weeks ago Other epilepsy without status epilepticus, not intractable [facility])    [address], [address] Lupe Mccord MD    Telemedicine    4 months ago [address] Sanchez, [address] Lupe Mccord MD    Office Visit    5 months ago Attention deficit disorder, unspecified hyperactivity presence    Oscar Young MD    Office Visit    7 months ago Chronic nonintractable headache, unspecified headache type    Oscar Young MD    Telemedicine    1 year ago Other epilepsy without status epilepticus, not intractable [facility])    [place] [address], [address], [place], [place]    Telemedicine

## 2023-01-02 RX ORDER — HYDROCODONE BITARTRATE AND ACETAMINOPHEN 10; 325 MG/1; MG/1
1 TABLET ORAL EVERY 6 HOURS PRN
Qty: 30 TABLET | Refills: 0 | Status: SHIPPED | OUTPATIENT
Start: 2023-01-02

## 2023-01-03 NOTE — TELEPHONE ENCOUNTER
Message noted: Chart reviewed and may refill medication as requested. Script sent to listed pharmacy by secure method.     Pt notified through [facility]

## 2023-01-11 RX ORDER — HYDROCODONE BITARTRATE AND ACETAMINOPHEN 10; 325 MG/1; MG/1
1 TABLET ORAL EVERY 6 HOURS PRN
Qty: 30 TABLET | Refills: 0 | Status: SHIPPED | OUTPATIENT
Start: 2023-01-11 | End: 2023-01-16

## 2023-01-16 RX ORDER — HYDROCODONE BITARTRATE AND ACETAMINOPHEN 10; 325 MG/1; MG/1
1 TABLET ORAL EVERY 6 HOURS PRN
Qty: 30 TABLET | Refills: 0 | Status: SHIPPED | OUTPATIENT
Start: 2023-01-16

## 2023-01-16 NOTE — TELEPHONE ENCOUNTER
Message noted: Chart reviewed and may refill medication as requested. Script sent to listed pharmacy by secure method.     Pt notified through [facility]

## 2023-01-21 RX ORDER — HYDROCODONE BITARTRATE AND ACETAMINOPHEN 10; 325 MG/1; MG/1
1 TABLET ORAL EVERY 6 HOURS PRN
Qty: 30 TABLET | Refills: 0 | Status: SHIPPED | OUTPATIENT
Start: 2023-01-21

## 2023-01-26 RX ORDER — HYDROCODONE BITARTRATE AND ACETAMINOPHEN 10; 325 MG/1; MG/1
1 TABLET ORAL EVERY 6 HOURS PRN
Qty: 30 TABLET | Refills: 0 | Status: SHIPPED | OUTPATIENT
Start: 2023-01-26

## 2023-01-26 NOTE — TELEPHONE ENCOUNTER
Please review. Protocol Failed or has No Protocol. Requested Prescriptions   Pending Prescriptions Disp Refills    HYDROcodone-acetaminophen (NORCO)  MG Oral Tab 30 tablet 0     Sig: Take 1 tablet by mouth every 6 (six) hours as needed for Pain. Medication may causes sedation, constipation. Not to operate heavy machinery or drive on medication.        There is no refill protocol information for this order              Recent Outpatient Visits              1 month ago Other epilepsy without status epilepticus, not intractable ([facility].)    [address], [address] Tahira Mccord MD    Telemedicine    5 months ago [address], [address] Tahira Mccord MD    Office Visit    6 months ago Attention deficit disorder, unspecified hyperactivity presence    Miladys Tidwell MD    Office Visit    8 months ago Chronic nonintractable headache, unspecified headache type    Miladys Tidwell MD    Telemedicine    1 year ago Other epilepsy without status epilepticus, not intractable [facility])    [address], [address]    Telemedicine

## 2023-01-26 NOTE — TELEPHONE ENCOUNTER
Message noted: Chart reviewed and may refill medication as requested. Script sent to listed pharmacy by secure method.     Pt notified through [facility]

## 2023-02-01 RX ORDER — HYDROCODONE BITARTRATE AND ACETAMINOPHEN 10; 325 MG/1; MG/1
1 TABLET ORAL EVERY 6 HOURS PRN
Qty: 30 TABLET | Refills: 0 | Status: SHIPPED | OUTPATIENT
Start: 2023-02-01

## 2023-02-08 RX ORDER — HYDROCODONE BITARTRATE AND ACETAMINOPHEN 10; 325 MG/1; MG/1
1 TABLET ORAL EVERY 6 HOURS PRN
Qty: 30 TABLET | Refills: 0 | Status: SHIPPED | OUTPATIENT
Start: 2023-02-08

## 2023-02-12 RX ORDER — HYDROCODONE BITARTRATE AND ACETAMINOPHEN 10; 325 MG/1; MG/1
1 TABLET ORAL EVERY 6 HOURS PRN
Qty: 30 TABLET | Refills: 0 | Status: CANCELLED | OUTPATIENT
Start: 2023-02-12

## 2023-02-15 RX ORDER — HYDROCODONE BITARTRATE AND ACETAMINOPHEN 10; 325 MG/1; MG/1
1 TABLET ORAL EVERY 6 HOURS PRN
Qty: 30 TABLET | Refills: 0 | Status: SHIPPED | OUTPATIENT
Start: 2023-02-15

## 2023-02-15 NOTE — TELEPHONE ENCOUNTER
Please review. Protocol failed or has no protocol. Requested Prescriptions   Pending Prescriptions Disp Refills    HYDROcodone-acetaminophen (NORCO)  MG Oral Tab 30 tablet 0     Sig: Take 1 tablet by mouth every 6 (six) hours as needed for Pain. Medication may causes sedation, constipation. Not to operate heavy machinery or drive on medication.        There is no refill protocol information for this order          Recent Outpatient Visits              2 months ago Other epilepsy without status epilepticus, not intractable ([facility]    Iliana Stark, 148 Rogelio Mccord MD    Telemedicine    6 months ago [address], [address] Rogelio Mccord MD    Office Visit    6 months ago Attention deficit disorder, unspecified hyperactivity presence    Libertad William MD    Office Visit    9 months ago Chronic nonintractable headache, unspecified headache type    Libertad William MD    Telemedicine    1 year ago Other epilepsy without status epilepticus, not intractable [facility])    [address]    Telemedicine

## 2023-02-15 NOTE — TELEPHONE ENCOUNTER
Message noted: Chart reviewed and may refill medication as requested. Script sent to listed pharmacy by secure method.     Pt notified through [facility]

## 2023-02-21 RX ORDER — HYDROCODONE BITARTRATE AND ACETAMINOPHEN 10; 325 MG/1; MG/1
1 TABLET ORAL EVERY 6 HOURS PRN
Qty: 30 TABLET | Refills: 0 | Status: SHIPPED | OUTPATIENT
Start: 2023-02-21 | End: 2023-02-27

## 2023-02-21 NOTE — TELEPHONE ENCOUNTER
Dr. Hilda Harris - signed 6 days ago. Appears to be taking this every 6 hours daily    Signed 6 days ago (2/15/2023)      Please Review. Protocol Failed or has no protocol. Requested Prescriptions   Pending Prescriptions Disp Refills    HYDROcodone-acetaminophen (NORCO)  MG Oral Tab 30 tablet 0     Sig: Take 1 tablet by mouth every 6 (six) hours as needed for Pain. Medication may causes sedation, constipation. Not to operate heavy machinery or drive on medication.        There is no refill protocol information for this order        Recent Outpatient Visits              2 months ago Other epilepsy without status epilepticus, not intractable ([facility]    [address], [address] [facility] Mame Luna MD    Telemedicine    6 months ago [address], [address] Mame Mccord MD    Office Visit    7 months ago Attention deficit disorder, unspecified hyperactivity presence    Stephani Kumari MD    Office Visit    9 months ago Chronic nonintractable headache, unspecified headache type    Stephani Kumari MD    Telemedicine    1 year ago Other epilepsy without status epilepticus, not intractable [facility])    [address], [address], [place], [place]    Telemedicine

## 2023-02-27 RX ORDER — HYDROCODONE BITARTRATE AND ACETAMINOPHEN 10; 325 MG/1; MG/1
1 TABLET ORAL EVERY 6 HOURS PRN
Qty: 30 TABLET | Refills: 0 | Status: SHIPPED | OUTPATIENT
Start: 2023-02-27

## 2023-02-27 NOTE — TELEPHONE ENCOUNTER
Message noted: Chart reviewed and may refill medication as requested. Script sent to listed pharmacy by secure method.     Pt notified through [facility]

## 2023-03-09 RX ORDER — HYDROCODONE BITARTRATE AND ACETAMINOPHEN 10; 325 MG/1; MG/1
1 TABLET ORAL EVERY 6 HOURS PRN
Qty: 30 TABLET | Refills: 0 | Status: SHIPPED | OUTPATIENT
Start: 2023-03-09

## 2023-03-09 NOTE — TELEPHONE ENCOUNTER
Please review. Protocol failed or has no protocol. Requested Prescriptions   Pending Prescriptions Disp Refills    HYDROcodone-acetaminophen (NORCO)  MG Oral Tab 30 tablet 0     Sig: Take 1 tablet by mouth every 6 (six) hours as needed for Pain. Medication may causes sedation, constipation. Not to operate heavy machinery or drive on medication.        There is no refill protocol information for this order          Recent Outpatient Visits              3 months ago Other epilepsy without status epilepticus, not intractable ([facility]    Georgina Barron, 148 Rogelio Mccord MD    Telemedicine    6 months ago [address], [address] Rogelio Mccord MD    Office Visit    7 months ago Attention deficit disorder, unspecified hyperactivity presence    Libertad William MD    Office Visit    9 months ago Chronic nonintractable headache, unspecified headache type    Libertad William MD    Telemedicine    1 year ago Other epilepsy without status epilepticus, not intractable [facility])    Georgina Barron [address]    Telemedicine

## 2023-03-27 RX ORDER — HYDROCODONE BITARTRATE AND ACETAMINOPHEN 10; 325 MG/1; MG/1
1 TABLET ORAL EVERY 6 HOURS PRN
Qty: 30 TABLET | Refills: 0 | Status: SHIPPED | OUTPATIENT
Start: 2023-03-27

## 2023-03-27 NOTE — TELEPHONE ENCOUNTER
Please review; no protocol  Medication pended for your review and approval.      Requested Prescriptions   Pending Prescriptions Disp Refills    HYDROcodone-acetaminophen (NORCO)  MG Oral Tab 30 tablet 0     Sig: Take 1 tablet by mouth every 6 (six) hours as needed for Pain. Medication may causes sedation, constipation. Not to operate heavy machinery or drive on medication.        There is no refill protocol information for this order

## 2023-03-27 NOTE — TELEPHONE ENCOUNTER
Message noted: Chart reviewed and may refill medication as requested. Script sent to listed pharmacy by secure method.     Pt notified through [facility]

## 2023-04-16 NOTE — TELEPHONE ENCOUNTER
No protocol for requested medication. Please advise on refill request.      Requested Prescriptions     Pending Prescriptions Disp Refills    HYDROcodone-acetaminophen (NORCO)  MG Oral Tab 30 tablet 0     Sig: Take 1 tablet by mouth every 6 (six) hours as needed for Pain. Medication may causes sedation, constipation. Not to operate heavy machinery or drive on medication. Recent Visits  Date Type Provider Dept   08/15/22 Office Visit Kosta Calhoun MD [facility]   07/25/22 Office Visit Kosta Calhoun MD [facility]   Showing recent visits within past 540 days with a meds authorizing provider and meeting all other requirements  Future Appointments  No visits were found meeting these conditions. Showing future appointments within next 150 days with a meds authorizing provider and meeting all other requirements     Requested Prescriptions   Pending Prescriptions Disp Refills    HYDROcodone-acetaminophen (NORCO)  MG Oral Tab 30 tablet 0     Sig: Take 1 tablet by mouth every 6 (six) hours as needed for Pain. Medication may causes sedation, constipation. Not to operate heavy machinery or drive on medication.        There is no refill protocol information for this order            Recent Outpatient Visits              4 months ago Other epilepsy without status epilepticus, not intractable ([facility]    Chikis Joyce MD    Telemedicine    8 months ago Kyler Lo MD    Office Visit    8 months ago Attention deficit disorder, unspecified hyperactivity presence    Chikis Joyce MD    Office Visit    11 months ago Chronic nonintractable headache, unspecified headache type    Chikis Joyce MD    Telemedicine    1 year ago Other epilepsy without status epilepticus, not intractable [facility])    [address], [place]    Telemedicine

## 2023-04-17 RX ORDER — HYDROCODONE BITARTRATE AND ACETAMINOPHEN 10; 325 MG/1; MG/1
1 TABLET ORAL EVERY 6 HOURS PRN
Qty: 30 TABLET | Refills: 0 | Status: SHIPPED | OUTPATIENT
Start: 2023-04-17

## 2023-04-18 RX ORDER — HYDROCODONE BITARTRATE AND ACETAMINOPHEN 10; 325 MG/1; MG/1
1 TABLET ORAL EVERY 6 HOURS PRN
Qty: 30 TABLET | Refills: 0 | OUTPATIENT
Start: 2023-04-18

## 2023-04-23 NOTE — TELEPHONE ENCOUNTER
Please review; protocol failed. Requested Prescriptions   Pending Prescriptions Disp Refills    HYDROcodone-acetaminophen (NORCO)  MG Oral Tab 30 tablet 0     Sig: Take 1 tablet by mouth every 6 (six) hours as needed for Pain. Medication may causes sedation, constipation. Not to operate heavy machinery or drive on medication.        There is no refill protocol information for this order          Future Appointments         Provider Department Appt Notes    In 3 months DO Frances [facility] [place] Epilepsy check up            Recent Outpatient Visits              4 months ago Other epilepsy without status epilepticus, not intractable ([facility]    [address], [address] Karlie Mccord MD    Telemedicine    8 months ago [address], [address] Karlie Mccord MD    Office Visit    9 months ago Attention deficit disorder, unspecified hyperactivity presence    Rizwana Ramos MD    Office Visit    11 months ago Chronic nonintractable headache, unspecified headache type    Rizwana Ramos MD    Telemedicine    1 year ago Other epilepsy without status epilepticus, not intractable [facility])    [address], [facility]    Telemedicine

## 2023-04-24 RX ORDER — HYDROCODONE BITARTRATE AND ACETAMINOPHEN 10; 325 MG/1; MG/1
1 TABLET ORAL EVERY 6 HOURS PRN
Qty: 30 TABLET | Refills: 0 | Status: SHIPPED | OUTPATIENT
Start: 2023-04-24

## 2023-04-25 ENCOUNTER — PATIENT MESSAGE (OUTPATIENT)
Dept: NEUROLOGY | Facility: CLINIC | Age: 28
End: 2023-04-25

## 2023-04-25 DIAGNOSIS — G40.802 OTHER EPILEPSY WITHOUT STATUS EPILEPTICUS, NOT INTRACTABLE (HCC): Primary | ICD-10-CM

## 2023-04-26 NOTE — TELEPHONE ENCOUNTER
Patient called to follow up the request for the letter.   Please call him at [phone] or   you can MyChart him

## 2023-04-28 RX ORDER — HYDROCODONE BITARTRATE AND ACETAMINOPHEN 10; 325 MG/1; MG/1
1 TABLET ORAL EVERY 6 HOURS PRN
Qty: 30 TABLET | Refills: 0 | Status: SHIPPED | OUTPATIENT
Start: 2023-04-28

## 2023-04-28 NOTE — TELEPHONE ENCOUNTER
No protocol for requested medication. Please advise on refill request.      Requested Prescriptions     Pending Prescriptions Disp Refills    HYDROcodone-acetaminophen (NORCO)  MG Oral Tab 30 tablet 0     Sig: Take 1 tablet by mouth every 6 (six) hours as needed for Pain. Medication may causes sedation, constipation. Not to operate heavy machinery or drive on medication. Recent Visits  Date Type Provider Dept   08/15/22 Office Visit Suresh Gupta MD [facility]   07/25/22 Office Visit Suresh Gupta MD [facility]   Showing recent visits within past 540 days with a meds authorizing provider and meeting all other requirements  Future Appointments  No visits were found meeting these conditions. Showing future appointments within next 150 days with a meds authorizing provider and meeting all other requirements     Requested Prescriptions   Pending Prescriptions Disp Refills    HYDROcodone-acetaminophen (NORCO)  MG Oral Tab 30 tablet 0     Sig: Take 1 tablet by mouth every 6 (six) hours as needed for Pain. Medication may causes sedation, constipation. Not to operate heavy machinery or drive on medication.        There is no refill protocol information for this order         Future Appointments         Provider Department Appt Notes    In 3 months Vini Torrez, DO [address] [facility] Epilepsy check up           Recent Outpatient Visits              4 months ago Other epilepsy without status epilepticus, not intractable ([facility]    Rachid Gamez MD    Telemedicine    8 months ago Jeremie Mckeon MD    Office Visit    9 months ago Attention deficit disorder, unspecified hyperactivity presence    Rachid Gamez MD    Office Visit    11 months ago Chronic nonintractable headache, unspecified headache type    Ruben Barraza MD    Telemedicine    1 year ago Other epilepsy without status epilepticus, not intractable [facility])    [address], [facility], [place], [place]    Telemedicine

## 2023-05-06 RX ORDER — HYDROCODONE BITARTRATE AND ACETAMINOPHEN 10; 325 MG/1; MG/1
1 TABLET ORAL EVERY 6 HOURS PRN
Qty: 30 TABLET | Refills: 0 | Status: SHIPPED | OUTPATIENT
Start: 2023-05-06

## 2023-05-06 NOTE — TELEPHONE ENCOUNTER
Please review. Protocol failed / No Protocol. Requested Prescriptions   Pending Prescriptions Disp Refills    HYDROcodone-acetaminophen (NORCO)  MG Oral Tab 30 tablet 0     Sig: Take 1 tablet by mouth every 6 (six) hours as needed for Pain. Medication may causes sedation, constipation. Not to operate heavy machinery or drive on medication.        There is no refill protocol information for this order

## 2023-05-06 NOTE — TELEPHONE ENCOUNTER
Message noted: Chart reviewed and may refill medication as requested. Script sent to listed pharmacy by secure method.     Pt notified through [facility]

## 2023-05-07 ENCOUNTER — LAB ENCOUNTER (OUTPATIENT)
Dept: LAB | Facility: HOSPITAL | Age: 28
End: 2023-05-07
Attending: Other
Payer: MEDICAID

## 2023-05-07 DIAGNOSIS — G40.802 CURSIVE EPILEPSY (HCC): Primary | ICD-10-CM

## 2023-05-07 PROCEDURE — 80177 DRUG SCRN QUAN LEVETIRACETAM: CPT

## 2023-05-07 PROCEDURE — 36415 COLL VENOUS BLD VENIPUNCTURE: CPT

## 2023-05-09 LAB — LEVETIRACETAM LVL: 25.7 UG/ML

## 2023-05-11 ENCOUNTER — PATIENT MESSAGE (OUTPATIENT)
Dept: NEUROLOGY | Facility: CLINIC | Age: 28
End: 2023-05-11

## 2023-05-12 ENCOUNTER — TELEMEDICINE (OUTPATIENT)
Dept: NEUROLOGY | Facility: CLINIC | Age: 28
End: 2023-05-12
Payer: MEDICAID

## 2023-05-12 DIAGNOSIS — G40.802 OTHER EPILEPSY WITHOUT STATUS EPILEPTICUS, NOT INTRACTABLE (HCC): Primary | ICD-10-CM

## 2023-05-12 PROCEDURE — 99212 OFFICE O/P EST SF 10 MIN: CPT | Performed by: OTHER

## 2023-05-12 RX ORDER — LEVETIRACETAM 750 MG/1
750 TABLET ORAL 2 TIMES DAILY
Qty: 180 TABLET | Refills: 3 | Status: SHIPPED | OUTPATIENT
Start: 2023-05-12 | End: 2024-05-11

## 2023-05-15 RX ORDER — HYDROCODONE BITARTRATE AND ACETAMINOPHEN 10; 325 MG/1; MG/1
1 TABLET ORAL EVERY 6 HOURS PRN
Qty: 30 TABLET | Refills: 0 | Status: SHIPPED | OUTPATIENT
Start: 2023-05-15

## 2023-05-15 RX ORDER — HYDROCODONE BITARTRATE AND ACETAMINOPHEN 10; 325 MG/1; MG/1
1 TABLET ORAL EVERY 6 HOURS PRN
Qty: 30 TABLET | Refills: 0 | Status: CANCELLED | OUTPATIENT
Start: 2023-05-15

## 2023-05-20 RX ORDER — HYDROCODONE BITARTRATE AND ACETAMINOPHEN 10; 325 MG/1; MG/1
1 TABLET ORAL EVERY 6 HOURS PRN
Qty: 30 TABLET | Refills: 0 | Status: SHIPPED | OUTPATIENT
Start: 2023-05-20

## 2023-05-26 NOTE — TELEPHONE ENCOUNTER
Spoke to [facility] and patient picked up the 5/20/2023 hydrocodone prescription on 5/22/2023.

## 2023-05-26 NOTE — TELEPHONE ENCOUNTER
Please review. Protocol failed or has no protocol. Requested Prescriptions   Pending Prescriptions Disp Refills    HYDROcodone-acetaminophen (NORCO)  MG Oral Tab 30 tablet 0     Sig: Take 1 tablet by mouth every 6 (six) hours as needed for Pain. Medication may causes sedation, constipation. Not to operate heavy machinery or drive on medication.        There is no refill protocol information for this order          Recent Outpatient Visits              2 weeks ago Other epilepsy without status epilepticus, not intractable ([facility]    [address], [address] [facility],[address], [facility]    Telemedicine    5 months ago Other epilepsy without status epilepticus, not intractable ([facility]    [address], [address] [facility], Mame Brumfield MD    Telemedicine    9 months ago [address], Serge De La Rosa MD    Office Visit    10 months ago Attention deficit disorder, unspecified hyperactivity presence    Stephani Kumari MD    Office Visit    1 year ago Chronic nonintractable headache, unspecified headache type    Stephani Kumari MD    Telemedicine            Future Appointments         Provider Department Appt Notes    In 2 months Yany Yi DO [address], [address], [place] Epilepsy check up

## 2023-05-27 RX ORDER — HYDROCODONE BITARTRATE AND ACETAMINOPHEN 10; 325 MG/1; MG/1
1 TABLET ORAL EVERY 6 HOURS PRN
Qty: 30 TABLET | Refills: 0 | Status: SHIPPED | OUTPATIENT
Start: 2023-05-27

## 2023-05-27 NOTE — TELEPHONE ENCOUNTER
Message noted: Chart reviewed and may refill medication as requested. Script sent to listed pharmacy by secure method.     Pt notified through [facility] (2) cough or sneeze

## 2023-06-01 RX ORDER — HYDROCODONE BITARTRATE AND ACETAMINOPHEN 10; 325 MG/1; MG/1
1 TABLET ORAL EVERY 6 HOURS PRN
Qty: 30 TABLET | Refills: 0 | Status: SHIPPED | OUTPATIENT
Start: 2023-06-01

## 2023-06-03 RX ORDER — HYDROCODONE BITARTRATE AND ACETAMINOPHEN 10; 325 MG/1; MG/1
1 TABLET ORAL EVERY 6 HOURS PRN
Qty: 30 TABLET | Refills: 0 | OUTPATIENT
Start: 2023-06-03

## 2023-06-03 NOTE — TELEPHONE ENCOUNTER
Duplicate request, previously addressed. Past 14 days   Signed 2 days ago (6/1/2023): HYDROcodone-acetaminophen (NORCO)  MG Oral Tab   Sig: Take 1 tablet by mouth every 6 (six) hours as needed for Pain. Medication may causes sedation, constipation. Not to operate heavy machinery or drive on medication.    Disp:  30 tablet    Refills:  0   Signed by: Kinjal Schafer MD   Encounter Details

## 2023-06-08 RX ORDER — HYDROCODONE BITARTRATE AND ACETAMINOPHEN 10; 325 MG/1; MG/1
1 TABLET ORAL EVERY 6 HOURS PRN
Qty: 30 TABLET | Refills: 0 | Status: SHIPPED | OUTPATIENT
Start: 2023-06-08

## 2023-06-08 NOTE — TELEPHONE ENCOUNTER
Message noted: Chart reviewed and may refill medication as requested. Script sent to listed pharmacy by secure method.     Pt notified through [facility]

## 2023-06-14 NOTE — TELEPHONE ENCOUNTER
From: John Kang  To: Stew Rahman DO  Sent: 4/14/2021 3:08 PM CDT  Subject: Test Results Brandyn Smith,     I need to know what's going on with my EEG results. I'm trying to remain patient. But I'm fearing the worst. Please call me ASAP. negative...

## 2023-06-15 RX ORDER — HYDROCODONE BITARTRATE AND ACETAMINOPHEN 10; 325 MG/1; MG/1
1 TABLET ORAL EVERY 6 HOURS PRN
Qty: 30 TABLET | Refills: 0 | Status: SHIPPED | OUTPATIENT
Start: 2023-06-15 | End: 2023-06-19

## 2023-06-15 NOTE — TELEPHONE ENCOUNTER
Please review. Protocol failed or has no protocol. Requested Prescriptions   Pending Prescriptions Disp Refills    HYDROcodone-acetaminophen (NORCO)  MG Oral Tab 30 tablet 0     Sig: Take 1 tablet by mouth every 6 (six) hours as needed for Pain. Medication may causes sedation, constipation. Not to operate heavy machinery or drive on medication.        There is no refill protocol information for this order          Recent Outpatient Visits              1 month ago Other epilepsy without status epilepticus, not intractable ([facility]    [address], [address], [facility],     Telemedicine    6 months ago Other epilepsy without status epilepticus, not intractable ([facility]    [address], [address] Deisy Mccord MD    Telemedicine    10 months ago [address], Ale Samaniego MD    Office Visit    10 months ago Attention deficit disorder, unspecified hyperactivity presence    Charles Iyer MD    Office Visit    1 year ago Chronic nonintractable headache, unspecified headache type    Charles Iyer MD    Telemedicine            Future Appointments         Provider Department Appt Notes    In 1 month Ashly Garcia, DO [address], [address] [facility], [place] Epilepsy check up

## 2023-06-19 RX ORDER — HYDROCODONE BITARTRATE AND ACETAMINOPHEN 10; 325 MG/1; MG/1
1 TABLET ORAL EVERY 6 HOURS PRN
Qty: 30 TABLET | Refills: 0 | Status: SHIPPED | OUTPATIENT
Start: 2023-06-19

## 2023-06-29 RX ORDER — HYDROCODONE BITARTRATE AND ACETAMINOPHEN 10; 325 MG/1; MG/1
1 TABLET ORAL EVERY 6 HOURS PRN
Qty: 30 TABLET | Refills: 0 | Status: SHIPPED | OUTPATIENT
Start: 2023-06-29

## 2023-07-05 RX ORDER — HYDROCODONE BITARTRATE AND ACETAMINOPHEN 10; 325 MG/1; MG/1
1 TABLET ORAL EVERY 6 HOURS PRN
Qty: 30 TABLET | Refills: 0 | Status: SHIPPED | OUTPATIENT
Start: 2023-07-05

## 2023-07-05 NOTE — TELEPHONE ENCOUNTER
Message noted: Chart reviewed and may refill medication as requested. Script sent to listed pharmacy by secure method.     Pt notified through [facility]

## 2023-07-05 NOTE — TELEPHONE ENCOUNTER
Please review. Protocol failed or has no protocol. Requested Prescriptions   Pending Prescriptions Disp Refills    HYDROcodone-acetaminophen (NORCO)  MG Oral Tab 30 tablet 0     Sig: Take 1 tablet by mouth every 6 (six) hours as needed for Pain. Medication may causes sedation, constipation. Not to operate heavy machinery or drive on medication.        There is no refill protocol information for this order          Recent Outpatient Visits              1 month ago Other epilepsy without status epilepticus, not intractable ([facility]    [address], [address] [facility] [address], [facility]    Telemedicine    7 months ago Other epilepsy without status epilepticus, not intractable ([facility]    [address], [address] [place]    Telemedicine    10 months ago [address], Britney Alonso MD    Office Visit    11 months ago Attention deficit disorder, unspecified hyperactivity presence    Liyah Perrin MD    Office Visit    1 year ago Chronic nonintractable headache, unspecified headache type    Liyah Perrin MD    Telemedicine            Future Appointments         Provider Department Appt Notes    In 1 month Roxy Flanagan,  [address], [address], [place] Epilepsy check up

## 2023-07-11 RX ORDER — HYDROCODONE BITARTRATE AND ACETAMINOPHEN 10; 325 MG/1; MG/1
1 TABLET ORAL EVERY 6 HOURS PRN
Qty: 30 TABLET | Refills: 0 | Status: SHIPPED | OUTPATIENT
Start: 2023-07-11

## 2023-07-11 NOTE — TELEPHONE ENCOUNTER
Please review. Protocol failed or has no protocol. Requested Prescriptions   Pending Prescriptions Disp Refills    HYDROcodone-acetaminophen (NORCO)  MG Oral Tab 30 tablet 0     Sig: Take 1 tablet by mouth every 6 (six) hours as needed for Pain. Medication may causes sedation, constipation. Not to operate heavy machinery or drive on medication.        There is no refill protocol information for this order          Recent Outpatient Visits              2 months ago Other epilepsy without status epilepticus, not intractable ([facility]    Judson Sarmiento, [address] [facility],[address] [facility],     Telemedicine    7 months ago Other epilepsy without status epilepticus, not intractable ([facility]    [address], [address] [facility], Kade Jensen MD    Telemedicine    11 months ago [address], Mike Bryson MD    Office Visit    11 months ago Attention deficit disorder, unspecified hyperactivity presence    Tierra Brownlee MD    Office Visit    1 year ago Chronic nonintractable headache, unspecified headache type    Tierra Brownlee MD    Telemedicine            Future Appointments         Provider Department Appt Notes    In 1 month DO Judson Dawson, [address] [facility]

## 2023-07-18 RX ORDER — HYDROCODONE BITARTRATE AND ACETAMINOPHEN 10; 325 MG/1; MG/1
1 TABLET ORAL EVERY 6 HOURS PRN
Qty: 30 TABLET | Refills: 0 | Status: SHIPPED | OUTPATIENT
Start: 2023-07-18

## 2023-07-21 ENCOUNTER — HOSPITAL ENCOUNTER (EMERGENCY)
Facility: HOSPITAL | Age: 28
Discharge: NURSING, REHAB OR LONG-TERM CARE | End: 2023-07-22
Attending: EMERGENCY MEDICINE
Payer: MEDICAID

## 2023-07-21 DIAGNOSIS — F32.A DEPRESSION, UNSPECIFIED DEPRESSION TYPE: Primary | ICD-10-CM

## 2023-07-21 LAB
ANION GAP SERPL CALC-SCNC: 6 MMOL/L (ref 0–18)
APAP SERPL-MCNC: 12.7 UG/ML (ref 10–30)
BASOPHILS # BLD AUTO: 0.08 X10(3) UL (ref 0–0.2)
BASOPHILS NFR BLD AUTO: 0.8 %
BUN BLD-MCNC: 10 MG/DL (ref 7–18)
BUN/CREAT SERPL: 8.1 (ref 10–20)
CALCIUM BLD-MCNC: 8.7 MG/DL (ref 8.5–10.1)
CHLORIDE SERPL-SCNC: 106 MMOL/L (ref 98–112)
CO2 SERPL-SCNC: 26 MMOL/L (ref 21–32)
CREAT BLD-MCNC: 1.23 MG/DL
DEPRECATED RDW RBC AUTO: 39.5 FL (ref 35.1–46.3)
EGFRCR SERPLBLD CKD-EPI 2021: 81 ML/MIN/1.73M2 (ref 60–?)
EOSINOPHIL # BLD AUTO: 0.16 X10(3) UL (ref 0–0.7)
EOSINOPHIL NFR BLD AUTO: 1.6 %
ERYTHROCYTE [DISTWIDTH] IN BLOOD BY AUTOMATED COUNT: 12.2 % (ref 11–15)
ETHANOL SERPL-MCNC: <3 MG/DL (ref ?–3)
GLUCOSE BLD-MCNC: 127 MG/DL (ref 70–99)
HCT VFR BLD AUTO: 47 %
HGB BLD-MCNC: 16.4 G/DL
IMM GRANULOCYTES # BLD AUTO: 0.03 X10(3) UL (ref 0–1)
IMM GRANULOCYTES NFR BLD: 0.3 %
LYMPHOCYTES # BLD AUTO: 3.34 X10(3) UL (ref 1–4)
LYMPHOCYTES NFR BLD AUTO: 32.9 %
MCH RBC QN AUTO: 30.8 PG (ref 26–34)
MCHC RBC AUTO-ENTMCNC: 34.9 G/DL (ref 31–37)
MCV RBC AUTO: 88.2 FL
MONOCYTES # BLD AUTO: 0.75 X10(3) UL (ref 0.1–1)
MONOCYTES NFR BLD AUTO: 7.4 %
NEUTROPHILS # BLD AUTO: 5.78 X10 (3) UL (ref 1.5–7.7)
NEUTROPHILS # BLD AUTO: 5.78 X10(3) UL (ref 1.5–7.7)
NEUTROPHILS NFR BLD AUTO: 57 %
OSMOLALITY SERPL CALC.SUM OF ELEC: 287 MOSM/KG (ref 275–295)
PLATELET # BLD AUTO: 211 10(3)UL (ref 150–450)
POTASSIUM SERPL-SCNC: 3.3 MMOL/L (ref 3.5–5.1)
RBC # BLD AUTO: 5.33 X10(6)UL
SALICYLATES SERPL-MCNC: 5.9 MG/DL (ref 2.8–20)
SARS-COV-2 RNA RESP QL NAA+PROBE: NOT DETECTED
SODIUM SERPL-SCNC: 138 MMOL/L (ref 136–145)
WBC # BLD AUTO: 10.1 X10(3) UL (ref 4–11)

## 2023-07-21 PROCEDURE — 80143 DRUG ASSAY ACETAMINOPHEN: CPT | Performed by: EMERGENCY MEDICINE

## 2023-07-21 PROCEDURE — 82077 ASSAY SPEC XCP UR&BREATH IA: CPT | Performed by: EMERGENCY MEDICINE

## 2023-07-21 PROCEDURE — 80177 DRUG SCRN QUAN LEVETIRACETAM: CPT | Performed by: EMERGENCY MEDICINE

## 2023-07-21 PROCEDURE — 80048 BASIC METABOLIC PNL TOTAL CA: CPT | Performed by: EMERGENCY MEDICINE

## 2023-07-21 PROCEDURE — 99285 EMERGENCY DEPT VISIT HI MDM: CPT

## 2023-07-21 PROCEDURE — 80179 DRUG ASSAY SALICYLATE: CPT | Performed by: EMERGENCY MEDICINE

## 2023-07-21 PROCEDURE — 36415 COLL VENOUS BLD VENIPUNCTURE: CPT

## 2023-07-21 PROCEDURE — 80307 DRUG TEST PRSMV CHEM ANLYZR: CPT | Performed by: EMERGENCY MEDICINE

## 2023-07-21 PROCEDURE — 85025 COMPLETE CBC W/AUTO DIFF WBC: CPT | Performed by: EMERGENCY MEDICINE

## 2023-07-21 RX ORDER — NICOTINE 21 MG/24HR
1 PATCH, TRANSDERMAL 24 HOURS TRANSDERMAL DAILY
Status: DISCONTINUED | OUTPATIENT
Start: 2023-07-21 | End: 2023-07-22

## 2023-07-21 RX ORDER — HYDROCODONE BITARTRATE AND ACETAMINOPHEN 5; 325 MG/1; MG/1
1 TABLET ORAL ONCE
Status: COMPLETED | OUTPATIENT
Start: 2023-07-21 | End: 2023-07-21

## 2023-07-22 VITALS
DIASTOLIC BLOOD PRESSURE: 67 MMHG | RESPIRATION RATE: 18 BRPM | SYSTOLIC BLOOD PRESSURE: 113 MMHG | OXYGEN SATURATION: 98 % | TEMPERATURE: 98 F | HEART RATE: 75 BPM

## 2023-07-22 LAB
AMPHET UR QL SCN: NEGATIVE
BENZODIAZ UR QL SCN: NEGATIVE
BILIRUB UR QL: NEGATIVE
CANNABINOIDS UR QL SCN: NEGATIVE
CLARITY UR: CLEAR
COCAINE UR QL: NEGATIVE
CREAT UR-SCNC: 146 MG/DL
GLUCOSE UR-MCNC: NORMAL MG/DL
HGB UR QL STRIP.AUTO: NEGATIVE
KETONES UR-MCNC: NEGATIVE MG/DL
LEUKOCYTE ESTERASE UR QL STRIP.AUTO: NEGATIVE
MDMA UR QL SCN: NEGATIVE
NITRITE UR QL STRIP.AUTO: NEGATIVE
OXYCODONE UR QL SCN: NEGATIVE
PH UR: 6 [PH] (ref 5–8)
PROT UR-MCNC: NEGATIVE MG/DL
SP GR UR STRIP: 1.01 (ref 1–1.03)
UROBILINOGEN UR STRIP-ACNC: NORMAL

## 2023-07-22 NOTE — ED NOTES
This writer contacted [facility] for admission. Intake requested a packet to be faxed to [phone]. Faxed packet to [facility]    This writer contacted [facility]- no answer      This writer contacted [facility]. Patient preference.   Spoke with intake provided report for patient requested a packet to be faxed to [phone] is reviewing packet

## 2023-07-22 NOTE — ED PROVIDER NOTES
Patient endorsed to me by Dr Sol Butts in setting of Suicidal ideation. No issues on my shift. Seen by crisis team, feel pt should be transferred for further care at psychiatric facility. Medically cleared. Awaiting psychiatric transfer.

## 2023-07-22 NOTE — ED INITIAL ASSESSMENT (HPI)
Patient ambulatory to triage, patient is seeking mental health resources. Patient denies any plans of suicide, stated he has in the past but his dad committed suicide in 2021, since then his mental health has been declining. Patient stated he has thought about taking a bunch of pills or cutting his wrist open to end his life, but stated he does not intend to act on this.

## 2023-07-22 NOTE — ED NOTES
This writer presented to bedside of patient to review over rights of individuals and to complete voluntary admission. Rights reviewed and signed    Voluntary Admission completed. -Patient reports being interested in hospitals in [facility]. Patient reports mother needing to get his car from [place] parking lot. This writer will inform patient of admission.
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
## 2023-07-22 NOTE — ED QUICK NOTES
[facility] at bedside to transport patient to [facility]. Report given to ems. Public safety called for patient belongings.  All paperwork accompanying patient

## 2023-07-25 LAB — LEVETIRACETAM LVL: 35.4 UG/ML

## 2023-07-29 RX ORDER — HYDROCODONE BITARTRATE AND ACETAMINOPHEN 10; 325 MG/1; MG/1
1 TABLET ORAL EVERY 6 HOURS PRN
Qty: 10 TABLET | Refills: 0 | Status: SHIPPED | OUTPATIENT
Start: 2023-07-29 | End: 2023-07-31

## 2023-07-29 NOTE — TELEPHONE ENCOUNTER
Message noted. Attempted to call but no answer and unable to leave voice message. Received ER notification that pt was seen in ER recently for depression and has follow up scheduled for 7/31/23. Please call patient for update on condition and to notify  May refill small quantity for his norco until seen. Erx sent to listed pharmacy - [facility].

## 2023-07-31 ENCOUNTER — TELEPHONE (OUTPATIENT)
Dept: FAMILY MEDICINE CLINIC | Facility: CLINIC | Age: 28
End: 2023-07-31

## 2023-07-31 ENCOUNTER — OFFICE VISIT (OUTPATIENT)
Dept: FAMILY MEDICINE CLINIC | Facility: CLINIC | Age: 28
End: 2023-07-31

## 2023-07-31 VITALS
HEART RATE: 96 BPM | SYSTOLIC BLOOD PRESSURE: 139 MMHG | BODY MASS INDEX: 23.06 KG/M2 | HEIGHT: 73 IN | DIASTOLIC BLOOD PRESSURE: 69 MMHG | RESPIRATION RATE: 18 BRPM | WEIGHT: 174 LBS | TEMPERATURE: 98 F

## 2023-07-31 DIAGNOSIS — R51.9 CHRONIC NONINTRACTABLE HEADACHE, UNSPECIFIED HEADACHE TYPE: ICD-10-CM

## 2023-07-31 DIAGNOSIS — K13.0 LIP MASS: ICD-10-CM

## 2023-07-31 DIAGNOSIS — G89.29 CHRONIC NONINTRACTABLE HEADACHE, UNSPECIFIED HEADACHE TYPE: ICD-10-CM

## 2023-07-31 DIAGNOSIS — F32.A DEPRESSION, UNSPECIFIED DEPRESSION TYPE: ICD-10-CM

## 2023-07-31 DIAGNOSIS — F98.8 ATTENTION DEFICIT DISORDER, UNSPECIFIED HYPERACTIVITY PRESENCE: ICD-10-CM

## 2023-07-31 PROCEDURE — 3008F BODY MASS INDEX DOCD: CPT | Performed by: FAMILY MEDICINE

## 2023-07-31 PROCEDURE — 3078F DIAST BP <80 MM HG: CPT | Performed by: FAMILY MEDICINE

## 2023-07-31 PROCEDURE — 3075F SYST BP GE 130 - 139MM HG: CPT | Performed by: FAMILY MEDICINE

## 2023-07-31 PROCEDURE — 99214 OFFICE O/P EST MOD 30 MIN: CPT | Performed by: FAMILY MEDICINE

## 2023-07-31 RX ORDER — HYDROCODONE BITARTRATE AND ACETAMINOPHEN 10; 325 MG/1; MG/1
1 TABLET ORAL EVERY 6 HOURS PRN
Qty: 30 TABLET | Refills: 0 | Status: SHIPPED | OUTPATIENT
Start: 2023-07-31

## 2023-07-31 RX ORDER — DEXTROAMPHETAMINE SACCHARATE, AMPHETAMINE ASPARTATE, DEXTROAMPHETAMINE SULFATE AND AMPHETAMINE SULFATE 7.5; 7.5; 7.5; 7.5 MG/1; MG/1; MG/1; MG/1
30 TABLET ORAL DAILY
Qty: 30 TABLET | Refills: 0 | Status: SHIPPED | OUTPATIENT
Start: 2023-07-31

## 2023-07-31 RX ORDER — DEXTROAMPHETAMINE SACCHARATE, AMPHETAMINE ASPARTATE, DEXTROAMPHETAMINE SULFATE AND AMPHETAMINE SULFATE 7.5; 7.5; 7.5; 7.5 MG/1; MG/1; MG/1; MG/1
30 TABLET ORAL DAILY
COMMUNITY
End: 2023-07-31

## 2023-07-31 NOTE — TELEPHONE ENCOUNTER
amphetamine-dextroamphetamine 30 MG Oral Tab, Take 1 tablet (30 mg total) by mouth daily. , Disp: 30 tablet, Rfl: 0

## 2023-07-31 NOTE — PROGRESS NOTES
Subjective:   Patient ID: Beau Ayon is a 25year old male. Pt presents for follow up [facility]. Was discharges last Thursday for depression. Pt states he is being treated with prozac for his depression. He had not been taking any medications recently. Was on adderall in past for ADD and is asking for a script until he can see his psychiatrist. Pt has appointment for next month. No suicidal ideation. Has had some difficulty recently and has been living with uncle. Pt has temporarily returned for time being. Pt presents also with wart like growth on the upper lip for several month. No pains or drainage. History/Other:   Review of Systems   Constitutional:  Negative for fever. Psychiatric/Behavioral:  Positive for decreased concentration. Negative for self-injury and suicidal ideas. Dysphoric mood: stable on medication. The patient is not nervous/anxious. Current Outpatient Medications   Medication Sig Dispense Refill    HYDROcodone-acetaminophen (NORCO)  MG Oral Tab Take 1 tablet by mouth every 6 (six) hours as needed for Pain. Medication may causes sedation, constipation. Not to operate heavy machinery or drive on medication. 30 tablet 0    amphetamine-dextroamphetamine 30 MG Oral Tab Take 1 tablet (30 mg total) by mouth daily. 30 tablet 0    levetiracetam 750 MG Oral Tab Take 1 tablet (750 mg total) by mouth 2 (two) times daily. 180 tablet 3     Allergies:  Benadryl [Diphenhyd*    NAUSEA AND VOMITING  Tramadol                ANXIETY, ITCHING    Objective:   Physical Exam  Constitutional:       Appearance: Normal appearance. HENT:      Mouth/Throat:      Comments: Growth on upper lip. No bleeding or drainage. Cardiovascular:      Rate and Rhythm: Normal rate and regular rhythm. Pulses: Normal pulses. Heart sounds: Normal heart sounds. Pulmonary:      Effort: Pulmonary effort is normal.      Breath sounds: Normal breath sounds.    Neurological: Mental Status: He is alert. Psychiatric:         Mood and Affect: Mood normal.         Behavior: Behavior normal.         Thought Content: Thought content normal.         Judgment: Judgment normal.         Assessment & Plan:   Depression, unspecified depression type/ Attention deficit disorder, unspecified hyperactivity presence:  - Stable, continue present management and follow up with psychiatrist as scheduled. Adderall renewed this time until seen by psychiatrist. To drop off discharge summary. To call if any sig symptoms. Patient verbalized understanding of recommendations and agrees to plan. Chronic nonintractable headache, unspecified headache type:  - Stable: medication reviewed and renewed; To continue present treatment; To call if problems; Routine follow up in 6-12 months. Lip mass:  - After discussion, will send to ENT for further evaluation and treatment; To call if any significant symptoms. No orders of the defined types were placed in this encounter. Meds This Visit:  Requested Prescriptions     Signed Prescriptions Disp Refills    HYDROcodone-acetaminophen (NORCO)  MG Oral Tab 30 tablet 0     Sig: Take 1 tablet by mouth every 6 (six) hours as needed for Pain. Medication may causes sedation, constipation. Not to operate heavy machinery or drive on medication. amphetamine-dextroamphetamine 30 MG Oral Tab 30 tablet 0     Sig: Take 1 tablet (30 mg total) by mouth daily.        Imaging & Referrals:  ENT - INTERNAL

## 2023-08-02 ENCOUNTER — OFFICE VISIT (OUTPATIENT)
Dept: OTOLARYNGOLOGY | Facility: CLINIC | Age: 28
End: 2023-08-02

## 2023-08-02 DIAGNOSIS — K13.0 LIP LESION: ICD-10-CM

## 2023-08-02 DIAGNOSIS — K13.0 LIP MASS: Primary | ICD-10-CM

## 2023-08-02 PROCEDURE — 88305 TISSUE EXAM BY PATHOLOGIST: CPT | Performed by: STUDENT IN AN ORGANIZED HEALTH CARE EDUCATION/TRAINING PROGRAM

## 2023-08-02 NOTE — PROGRESS NOTES
Paloma Vazquez is a [address]17 [address] old male. Patient presents with:  Mass: Pt reports a lip mass. X 5 months. ASSESSMENT AND PLAN:   1. Lip mass  [age] [address] with a right upper lip lesion. Is been present for several months. Very bothersome to him with appearance and eating. About 1 x 1 cm papilloma appearing mass of his right upper lip. Not involving the vermilion border. It is just right of midline. Mostly of the wet lip encroaching onto the dry red lip mucosa as well. Decision was made to excise this under local anesthesia in the office. I advised him that if this comes back is more of a malignant process would need likely reexcision for larger margins if these are positive. Excision performed with dissolvable sutures. Discussed to not bite his lip and to avoid crunchy or hard foods. Can use Vaseline or antibiotic ointment once or twice a day for the crusting and healing. Can follow-up if any issues exist with the healing process. Consult from Dr. Encinas Area regarding lip mass    2. Lip lesion        The patient indicates understanding of these issues and agrees to the plan. EXAM:   There were no vitals taken for this visit.     Pertinent exam findings may also be noted above in assessment and plan     System Details   Skin Inspection - Normal.   Constitutional Overall appearance - Normal.   Head/Face Symmetric, TMJ tenderness not present    Eyes EOMI, PERRL   Right ear:  Canal clear, TM intact, no DANNY   Left ear:  Canal clear, TM intact, no DANNY   Nose: Septum midline, inferior turbinates not enlarged, nasal valves without collapse    Oral cavity/Oropharynx: No lesions or masses on inspection or palpation, tonsils symmetric    Neck: Soft without LAD, thyroid not enlarged  Voice clear/ no stridor   Other:      Scopes and Procedures:     Procedure: Excision of right upper lip lesion   consent obtained from patient  3cc of 1% lidocaine with 1:100,000 epinephrine injected into area of concern  After adequate anesthesia the lesion was excised with a 15 blade in elliptical fashion and scissors  The lesion was excised around its margins and along the deep aspect  This was sent for permament specimen  Silver nitrate and pressure applied for hemostasis  Times 3 chromic suture 4-0 in size replaced to bring the incision back together. Patient tolerated this well        REVIEW OF SYSTEMS:   GENERAL HEALTH: feels well otherwise  GENERAL : denies fever, chills, sweats, weight loss, weight gain  SKIN: denies any unusual skin lesions or rashes  RESPIRATORY: denies shortness of breath with exertion  NEURO: denies headaches    Current Outpatient Medications   Medication Sig Dispense Refill    HYDROcodone-acetaminophen (NORCO)  MG Oral Tab Take 1 tablet by mouth every 6 (six) hours as needed for Pain. Medication may causes sedation, constipation. Not to operate heavy machinery or drive on medication. 30 tablet 0    amphetamine-dextroamphetamine 30 MG Oral Tab Take 1 tablet (30 mg total) by mouth daily. 30 tablet 0    levetiracetam 750 MG Oral Tab Take 1 tablet (750 mg total) by mouth 2 (two) times daily. 180 tablet 3      Past Medical History:   Diagnosis Date    Anxiety     Alferd Ralph disease 2016    Panic disorder 04/2017    [facility]     Reactive hypoglycemia     Simple febrile seizure ([facility] [address] 1994      Social History:  Social History     Socioeconomic History    Marital status: Single   Tobacco Use    Smoking status: Every Day     Packs/day: 0.50     Years: 10.00     Pack years: 5.00     Types: Cigarettes    Smokeless tobacco: Never   Vaping Use    Vaping Use: Never used   Substance and Sexual Activity    Alcohol use: Yes     Comment: special occassions only    Drug use: Yes     Types: Cannabis     Comment: smoke marijuana   Other Topics Concern    Caffeine Concern Yes     Comment: coffee pop daily    Exercise No   Social History Narrative    The patient does not use an assistive device. .      The patient does live in a home with stairs.           Makeda Hamlin MD  8/2/2023  10:26 AM

## 2023-08-04 RX ORDER — HYDROCODONE BITARTRATE AND ACETAMINOPHEN 10; 325 MG/1; MG/1
1 TABLET ORAL EVERY 6 HOURS PRN
Qty: 30 TABLET | Refills: 0 | OUTPATIENT
Start: 2023-08-04

## 2023-08-05 RX ORDER — HYDROCODONE BITARTRATE AND ACETAMINOPHEN 10; 325 MG/1; MG/1
1 TABLET ORAL EVERY 6 HOURS PRN
Qty: 30 TABLET | Refills: 0 | Status: CANCELLED | OUTPATIENT
Start: 2023-08-05

## 2023-08-05 NOTE — TELEPHONE ENCOUNTER
Please review. Protocol Failed or has No Protocol. Requested Prescriptions   Refused Prescriptions Disp Refills    HYDROcodone-acetaminophen (NORCO)  MG Oral Tab 30 tablet 0     Sig: Take 1 tablet by mouth every 6 (six) hours as needed for Pain. Medication may causes sedation, constipation. Not to operate heavy machinery or drive on medication.        There is no refill protocol information for this order          Recent Outpatient Visits              3 days ago Lip mass    [facility], Miriam Barbosa MD    Office Visit    5 days ago Depression, unspecified depression type    Chikis Joyce MD    Office Visit    2 months ago Other epilepsy without status epilepticus, not intractable ([facility]    [address], [address] [facility],[address], [facility],     Telemedicine    8 months ago Other epilepsy without status epilepticus, not intractable ([facility]    Chikis Joyce MD    Telemedicine    11 months ago Lianet Damon, Luis Velarde MD    Office Visit            Future Appointments         Provider Department Appt Notes    In 5 days Yue Dubois DO [address], [address], [place] [facility] check up

## 2023-08-07 ENCOUNTER — TELEPHONE (OUTPATIENT)
Dept: FAMILY MEDICINE CLINIC | Facility: CLINIC | Age: 28
End: 2023-08-07

## 2023-08-07 RX ORDER — HYDROCODONE BITARTRATE AND ACETAMINOPHEN 10; 325 MG/1; MG/1
1 TABLET ORAL EVERY 6 HOURS PRN
Qty: 30 TABLET | Refills: 0 | OUTPATIENT
Start: 2023-08-07

## 2023-08-07 RX ORDER — HYDROCODONE BITARTRATE AND ACETAMINOPHEN 10; 325 MG/1; MG/1
1 TABLET ORAL EVERY 6 HOURS PRN
Qty: 30 TABLET | Refills: 0 | Status: SHIPPED | OUTPATIENT
Start: 2023-08-07

## 2023-08-07 NOTE — TELEPHONE ENCOUNTER
Pt asking Dr Danette Mehta to refill: (pt requested in [facility] message also) H will be out today.     [address] [address], [phone], [phone]

## 2023-08-11 RX ORDER — HYDROCODONE BITARTRATE AND ACETAMINOPHEN 10; 325 MG/1; MG/1
1 TABLET ORAL EVERY 6 HOURS PRN
Qty: 30 TABLET | Refills: 0 | Status: SHIPPED | OUTPATIENT
Start: 2023-08-11 | End: 2023-08-19

## 2023-08-11 NOTE — TELEPHONE ENCOUNTER
Please review. Protocol failed / Has no protocol. Requested Prescriptions   Pending Prescriptions Disp Refills    HYDROcodone-acetaminophen (NORCO)  MG Oral Tab 30 tablet 0     Sig: Take 1 tablet by mouth every 6 (six) hours as needed for Pain. Medication may causes sedation, constipation. Not to operate heavy machinery or drive on medication.        There is no refill protocol information for this order           Recent Outpatient Visits              1 week ago Lip mass    [facility], Maddy Mak MD    Office Visit    1 week ago Depression, unspecified depression type    Winston Gupta MD    Office Visit    3 months ago Other epilepsy without status epilepticus, not intractable [facility])    [address] [place],[address], [address] [facility],[address], [facility],     Telemedicine    8 months ago Other epilepsy without status epilepticus, not intractable [facility])    Winston Gupta MD    Telemedicine    12 months ago Claudette Aragon MD    Office Visit

## 2023-08-14 ENCOUNTER — TELEPHONE (OUTPATIENT)
Dept: FAMILY MEDICINE CLINIC | Facility: CLINIC | Age: 28
End: 2023-08-14

## 2023-08-14 DIAGNOSIS — R51.9 CHRONIC NONINTRACTABLE HEADACHE, UNSPECIFIED HEADACHE TYPE: Primary | ICD-10-CM

## 2023-08-14 DIAGNOSIS — G89.29 CHRONIC NONINTRACTABLE HEADACHE, UNSPECIFIED HEADACHE TYPE: Primary | ICD-10-CM

## 2023-08-14 NOTE — TELEPHONE ENCOUNTER
Pharmacy notified, they need to know why this is being dispensed every 7 days   Weekly fills need pharmacy note with reason for weekly dispense       Chronic pain should be every 30 days per pharmacy

## 2023-08-14 NOTE — TELEPHONE ENCOUNTER
Pharmacy is asking for dx code for medication use of Norco.  Please advise of diagnosis to provide for pharmacy.

## 2023-08-16 NOTE — TELEPHONE ENCOUNTER
Spoke to [employer]. She said that whatever the issue was- was resolved because the medication was sold. Explained it was about the quantity of Rx. [facility] rep said it was resolved.

## 2023-08-16 NOTE — TELEPHONE ENCOUNTER
Medication has been as needed script for patient. Have been trying to see if other medications helping for his headache and pain. Has been seeing neurology for pain. Do not wish to increase quantity at this time.

## 2023-08-21 RX ORDER — HYDROCODONE BITARTRATE AND ACETAMINOPHEN 10; 325 MG/1; MG/1
1 TABLET ORAL EVERY 6 HOURS PRN
Qty: 30 TABLET | Refills: 0 | Status: SHIPPED | OUTPATIENT
Start: 2023-08-21

## 2023-08-21 RX ORDER — HYDROCODONE BITARTRATE AND ACETAMINOPHEN 10; 325 MG/1; MG/1
1 TABLET ORAL EVERY 6 HOURS PRN
Qty: 30 TABLET | Refills: 0 | OUTPATIENT
Start: 2023-08-21

## 2023-08-21 NOTE — TELEPHONE ENCOUNTER
Message noted: Chart reviewed and may refill medication as requested. Script sent to listed pharmacy by secure method.     Pt notified through [facility]

## 2023-08-21 NOTE — TELEPHONE ENCOUNTER
Patient called for an update, advised pending PCP reply.  Rx pended    Advised not to send additional request as it will auto cancel

## 2023-08-25 NOTE — TELEPHONE ENCOUNTER
Please review; protocol failed. Requested Prescriptions   Pending Prescriptions Disp Refills    HYDROcodone-acetaminophen (NORCO)  MG Oral Tab 30 tablet 0     Sig: Take 1 tablet by mouth every 6 (six) hours as needed for Pain. Medication may causes sedation, constipation. Not to operate heavy machinery or drive on medication.        There is no refill protocol information for this order        Recent Outpatient Visits              3 weeks ago Lip mass    Ibeth Phillips, [address], Louise Hernandez MD    Office Visit    3 weeks ago Depression, unspecified depression type    Eboni Sloan MD    Office Visit    3 months ago Other epilepsy without status epilepticus, not intractable [facility])    Ibeth Phillips, [address], [place],     Telemedicine    8 months ago Other epilepsy without status epilepticus, not intractable ([facility] [address]    Eboni Sloan MD    Telemedicine    1 year ago Linnette Adams MD    Office Visit

## 2023-08-26 RX ORDER — HYDROCODONE BITARTRATE AND ACETAMINOPHEN 10; 325 MG/1; MG/1
1 TABLET ORAL EVERY 6 HOURS PRN
Qty: 30 TABLET | Refills: 0 | Status: SHIPPED | OUTPATIENT
Start: 2023-08-26

## 2023-08-30 RX ORDER — HYDROCODONE BITARTRATE AND ACETAMINOPHEN 10; 325 MG/1; MG/1
1 TABLET ORAL EVERY 6 HOURS PRN
Qty: 30 TABLET | Refills: 0 | Status: SHIPPED | OUTPATIENT
Start: 2023-08-30

## 2023-09-07 RX ORDER — HYDROCODONE BITARTRATE AND ACETAMINOPHEN 10; 325 MG/1; MG/1
1 TABLET ORAL EVERY 6 HOURS PRN
Qty: 30 TABLET | Refills: 0 | Status: SHIPPED | OUTPATIENT
Start: 2023-09-07

## 2023-09-07 NOTE — TELEPHONE ENCOUNTER
Please review; protocol failed. Requested Prescriptions   Pending Prescriptions Disp Refills    HYDROcodone-acetaminophen (NORCO)  MG Oral Tab 30 tablet 0     Sig: Take 1 tablet by mouth every 6 (six) hours as needed for Pain. Medication may causes sedation, constipation. Not to operate heavy machinery or drive on medication.        There is no refill protocol information for this order        Recent Outpatient Visits              1 month ago Lip mass    [address], [address], Ryan Norwood MD    Office Visit    1 month ago Depression, unspecified depression type    Rizwana Ramos MD    Office Visit    3 months ago Other epilepsy without status epilepticus, not intractable [facility])    [address], [address], [place], [place]    Telemedicine    9 months ago Other epilepsy without status epilepticus, not intractable ([facility] [address]    Rizwana Ramos MD    Telemedicine    1 year ago Romero Hugo MD    Office Visit

## 2023-09-18 RX ORDER — HYDROCODONE BITARTRATE AND ACETAMINOPHEN 10; 325 MG/1; MG/1
1 TABLET ORAL EVERY 6 HOURS PRN
Qty: 30 TABLET | Refills: 0 | Status: SHIPPED | OUTPATIENT
Start: 2023-09-18

## 2023-09-19 ENCOUNTER — TELEPHONE (OUTPATIENT)
Dept: FAMILY MEDICINE CLINIC | Facility: CLINIC | Age: 28
End: 2023-09-19

## 2023-09-26 RX ORDER — HYDROCODONE BITARTRATE AND ACETAMINOPHEN 7.5; 325 MG/1; MG/1
1 TABLET ORAL EVERY 6 HOURS PRN
Qty: 30 TABLET | Refills: 0 | Status: SHIPPED | OUTPATIENT
Start: 2023-09-26

## 2023-09-26 NOTE — TELEPHONE ENCOUNTER
Message noted. May change to norco 7.5mg  as requested. Erx sent to listed pharmacy.   Please notify patient

## 2023-10-03 RX ORDER — HYDROCODONE BITARTRATE AND ACETAMINOPHEN 10; 325 MG/1; MG/1
1 TABLET ORAL EVERY 6 HOURS PRN
Qty: 30 TABLET | Refills: 0 | Status: SHIPPED | OUTPATIENT
Start: 2023-10-03 | End: 2023-10-07

## 2023-10-03 RX ORDER — HYDROCODONE BITARTRATE AND ACETAMINOPHEN 7.5; 325 MG/1; MG/1
1 TABLET ORAL EVERY 6 HOURS PRN
Qty: 30 TABLET | Refills: 0 | OUTPATIENT
Start: 2023-10-03

## 2023-10-03 NOTE — TELEPHONE ENCOUNTER
Please review. Protocol failed / Has no protocol. Requested Prescriptions   Pending Prescriptions Disp Refills    HYDROcodone-acetaminophen (NORCO)  MG Oral Tab 30 tablet 0     Sig: Take 1 tablet by mouth every 6 (six) hours as needed for Pain.        There is no refill protocol information for this order           Recent Outpatient Visits              2 months ago Lip mass    [address], [address], Kaelyn Floyd MD    Office Visit    2 months ago Depression, unspecified depression type    Tierra Brownlee MD    Office Visit    4 months ago Other epilepsy without status epilepticus, not intractable [facility])    [address], [address], [place],     Telemedicine    10 months ago Other epilepsy without status epilepticus, not intractable ([facility] [address]    Tierra Brownlee MD    Telemedicine    1 year ago Altagracia Rodriguez MD    Office Visit

## 2023-10-08 NOTE — TELEPHONE ENCOUNTER
Please review; protocol failed. Requested Prescriptions   Pending Prescriptions Disp Refills    HYDROcodone-acetaminophen (NORCO)  MG Oral Tab 30 tablet 0     Sig: Take 1 tablet by mouth every 6 (six) hours as needed for Pain.        There is no refill protocol information for this order              Recent Outpatient Visits              2 months ago Lip mass    Mary Patten, [address], Jessica Ash MD    Office Visit    2 months ago Depression, unspecified depression type    Nir Coats MD    Office Visit    4 months ago Other epilepsy without status epilepticus, not intractable [facility])    Mary Patten, [address], [place]    Telemedicine    10 months ago Other epilepsy without status epilepticus, not intractable ([facility] [address]    Nir Coats MD    Telemedicine    1 year ago Aye Alexander MD    Office Visit

## 2023-10-09 RX ORDER — HYDROCODONE BITARTRATE AND ACETAMINOPHEN 10; 325 MG/1; MG/1
1 TABLET ORAL EVERY 6 HOURS PRN
Qty: 30 TABLET | Refills: 0 | Status: SHIPPED | OUTPATIENT
Start: 2023-10-09

## 2023-10-13 ENCOUNTER — HOSPITAL ENCOUNTER (EMERGENCY)
Age: 28
Discharge: HOME | End: 2023-10-13
Attending: EMERGENCY MEDICINE

## 2023-10-13 VITALS
OXYGEN SATURATION: 99 % | SYSTOLIC BLOOD PRESSURE: 124 MMHG | HEART RATE: 84 BPM | RESPIRATION RATE: 16 BRPM | TEMPERATURE: 98.1 F | DIASTOLIC BLOOD PRESSURE: 68 MMHG

## 2023-10-13 DIAGNOSIS — K08.89 PAIN, DENTAL: Primary | ICD-10-CM

## 2023-10-13 LAB
ALBUMIN SERPL-MCNC: 4.2 G/DL (ref 3.6–5.1)
ALBUMIN/GLOB SERPL: 1.1 {RATIO} (ref 1–2.4)
ALP SERPL-CCNC: 153 UNITS/L (ref 45–117)
ALT SERPL-CCNC: 27 UNITS/L
ANION GAP SERPL CALC-SCNC: 9 MMOL/L (ref 7–19)
AST SERPL-CCNC: 29 UNITS/L
BASOPHILS # BLD: 0.1 K/MCL (ref 0–0.3)
BASOPHILS NFR BLD: 1 %
BILIRUB SERPL-MCNC: 0.7 MG/DL (ref 0.2–1)
BUN SERPL-MCNC: 13 MG/DL (ref 6–20)
BUN/CREAT SERPL: 13 (ref 7–25)
CALCIUM SERPL-MCNC: 9.3 MG/DL (ref 8.4–10.2)
CHLORIDE SERPL-SCNC: 108 MMOL/L (ref 97–110)
CO2 SERPL-SCNC: 27 MMOL/L (ref 21–32)
CREAT SERPL-MCNC: 1 MG/DL (ref 0.67–1.17)
DEPRECATED RDW RBC: 47.9 FL (ref 39–50)
EGFRCR SERPLBLD CKD-EPI 2021: >90 ML/MIN/{1.73_M2}
EOSINOPHIL # BLD: 0.2 K/MCL (ref 0–0.5)
EOSINOPHIL NFR BLD: 2 %
ERYTHROCYTE [DISTWIDTH] IN BLOOD: 13.3 % (ref 11–15)
FASTING DURATION TIME PATIENT: ABNORMAL H
GLOBULIN SER-MCNC: 3.7 G/DL (ref 2–4)
GLUCOSE SERPL-MCNC: 106 MG/DL (ref 70–99)
HCT VFR BLD CALC: 47.6 % (ref 39–51)
HGB BLD-MCNC: 15.4 G/DL (ref 13–17)
IMM GRANULOCYTES # BLD AUTO: 0.1 K/MCL (ref 0–0.2)
IMM GRANULOCYTES # BLD: 1 %
LYMPHOCYTES # BLD: 1.6 K/MCL (ref 1–4.8)
LYMPHOCYTES NFR BLD: 18 %
MCH RBC QN AUTO: 31.2 PG (ref 26–34)
MCHC RBC AUTO-ENTMCNC: 32.4 G/DL (ref 32–36.5)
MCV RBC AUTO: 96.6 FL (ref 78–100)
MONOCYTES # BLD: 0.7 K/MCL (ref 0.3–0.9)
MONOCYTES NFR BLD: 8 %
NEUTROPHILS # BLD: 6.4 K/MCL (ref 1.8–7.7)
NEUTROPHILS NFR BLD: 70 %
NRBC BLD MANUAL-RTO: 0 /100 WBC
PLATELET # BLD AUTO: 222 K/MCL (ref 140–450)
POTASSIUM SERPL-SCNC: 4.1 MMOL/L (ref 3.4–5.1)
PROT SERPL-MCNC: 7.9 G/DL (ref 6.4–8.2)
RBC # BLD: 4.93 MIL/MCL (ref 4.5–5.9)
SODIUM SERPL-SCNC: 140 MMOL/L (ref 135–145)
WBC # BLD: 9 K/MCL (ref 4.2–11)

## 2023-10-13 PROCEDURE — 99283 EMERGENCY DEPT VISIT LOW MDM: CPT

## 2023-10-13 PROCEDURE — 85025 COMPLETE CBC W/AUTO DIFF WBC: CPT | Performed by: EMERGENCY MEDICINE

## 2023-10-13 PROCEDURE — 80053 COMPREHEN METABOLIC PANEL: CPT | Performed by: EMERGENCY MEDICINE

## 2023-10-13 PROCEDURE — 99282 EMERGENCY DEPT VISIT SF MDM: CPT | Performed by: EMERGENCY MEDICINE

## 2023-10-13 ASSESSMENT — ENCOUNTER SYMPTOMS
LIGHT-HEADEDNESS: 0
NAUSEA: 0
ADENOPATHY: 0
DIARRHEA: 0
TROUBLE SWALLOWING: 0
ABDOMINAL PAIN: 0
BACK PAIN: 0
WEAKNESS: 0
CONFUSION: 0
VOMITING: 0
COUGH: 0
SORE THROAT: 0
SHORTNESS OF BREATH: 0
VOICE CHANGE: 0
SEIZURES: 0
BRUISES/BLEEDS EASILY: 0
POLYDIPSIA: 0
CHILLS: 0
BLOOD IN STOOL: 0
FEVER: 0
EYE PAIN: 0
FACIAL SWELLING: 0
RHINORRHEA: 0
HEADACHES: 0
WOUND: 0

## 2023-10-16 RX ORDER — HYDROCODONE BITARTRATE AND ACETAMINOPHEN 10; 325 MG/1; MG/1
1 TABLET ORAL EVERY 6 HOURS PRN
Qty: 30 TABLET | Refills: 0 | Status: SHIPPED | OUTPATIENT
Start: 2023-10-16

## 2023-10-16 NOTE — TELEPHONE ENCOUNTER
Please review. Protocol failed / Has no protocol. Last fills, all for qty 30 of 10mg Norco 9/3, 9/11, 9/19 (7.5mg), 9/26 (7.5mg), 10/3, 10/9    Requested Prescriptions   Pending Prescriptions Disp Refills    HYDROcodone-acetaminophen (NORCO)  MG Oral Tab 30 tablet 0     Sig: Take 1 tablet by mouth every 6 (six) hours as needed for Pain.        There is no refill protocol information for this order           Recent Outpatient Visits              2 months ago Lip mass    Drexel Boas, [address], Kristi Chase MD    Office Visit    2 months ago Depression, unspecified depression type    Lashon Hunter MD    Office Visit    5 months ago Other epilepsy without status epilepticus, not intractable [facility])    Drexel Boas, [address], [facility]    Telemedicine    10 months ago Other epilepsy without status epilepticus, not intractable ([facility] [address]    Lashon Hunter MD    Telemedicine    1 year ago Perez Jackman MD    Office Visit

## 2023-10-24 RX ORDER — HYDROCODONE BITARTRATE AND ACETAMINOPHEN 10; 325 MG/1; MG/1
1 TABLET ORAL EVERY 6 HOURS PRN
Qty: 30 TABLET | Refills: 0 | Status: SHIPPED | OUTPATIENT
Start: 2023-10-24

## 2023-10-24 RX ORDER — HYDROCODONE BITARTRATE AND ACETAMINOPHEN 10; 325 MG/1; MG/1
1 TABLET ORAL EVERY 6 HOURS PRN
Qty: 30 TABLET | Refills: 0 | OUTPATIENT
Start: 2023-10-24

## 2023-10-24 RX ORDER — HYDROCODONE BITARTRATE AND ACETAMINOPHEN 7.5; 325 MG/1; MG/1
1 TABLET ORAL EVERY 6 HOURS PRN
Qty: 30 TABLET | Refills: 0 | Status: CANCELLED | OUTPATIENT
Start: 2023-10-24

## 2023-10-24 NOTE — TELEPHONE ENCOUNTER
Please review; protocol failed. Requested Prescriptions   Pending Prescriptions Disp Refills    HYDROcodone-acetaminophen (NORCO) 7.5-325 MG Oral Tab 30 tablet 0     Sig: Take 1 tablet by mouth every 6 (six) hours as needed for Pain. Medication may causes sedation, constipation. Not to operate heavy machinery or drive on medication.        There is no refill protocol information for this order        Recent Outpatient Visits              2 months ago Lip mass    [address], [address], Shi Locke MD    Office Visit    2 months ago Depression, unspecified depression type    Libertad William MD    Office Visit    5 months ago Other epilepsy without status epilepticus, not intractable [facility])    [address], [address], [place],     Telemedicine    10 months ago Other epilepsy without status epilepticus, not intractable ([facility] [address]    Libertad William MD    Telemedicine    1 year ago Carter Ware MD    Office Visit

## 2023-10-24 NOTE — TELEPHONE ENCOUNTER
Please review; protocol failed.   Requested Prescriptions   Pending Prescriptions Disp Refills    HYDROcodone-acetaminophen (NORCO)  MG Oral Tab 30 tablet 0     Sig: Take 1 tablet by mouth every 6 (six) hours as needed for Pain.       There is no refill protocol information for this order        Recent Outpatient Visits              2 months ago [facility]    [facility]    [facility]    2 months ago Depression, unspecified depression type    [facility]    [facility]    5 months ago Other epilepsy without status epilepticus, not intractable ([facility])    [facility]    Telemedicine    10 months ago Other epilepsy without status epilepticus, not intractable ([facility])    [facility]    [facility]    1 year ago Dorsalgia    [facility]    [facility] Visit

## 2023-10-24 NOTE — TELEPHONE ENCOUNTER
Patient called and stated he is able to get the 10-325mg and he took his last one today.   Please advise

## 2023-10-30 NOTE — TELEPHONE ENCOUNTER
Please review refill protocol failed/ no protocol  Requested Prescriptions   Pending Prescriptions Disp Refills    HYDROcodone-acetaminophen (NORCO)  MG Oral Tab 30 tablet 0     Sig: Take 1 tablet by mouth every 6 (six) hours as needed for Pain.        There is no refill protocol information for this order

## 2023-10-31 RX ORDER — HYDROCODONE BITARTRATE AND ACETAMINOPHEN 10; 325 MG/1; MG/1
1 TABLET ORAL EVERY 6 HOURS PRN
Qty: 30 TABLET | Refills: 0 | Status: SHIPPED | OUTPATIENT
Start: 2023-10-31

## 2023-10-31 RX ORDER — HYDROCODONE BITARTRATE AND ACETAMINOPHEN 7.5; 325 MG/1; MG/1
1 TABLET ORAL EVERY 6 HOURS PRN
Qty: 30 TABLET | Refills: 0 | Status: SHIPPED | OUTPATIENT
Start: 2023-10-31

## 2023-10-31 NOTE — TELEPHONE ENCOUNTER
Dr. Jonathan Da Silva - request for 7.5mg-325mg [place] , instead    [facility]    10-325mg [facility] of [facility] per patient's message.

## 2023-10-31 NOTE — TELEPHONE ENCOUNTER
Message noted: Chart reviewed and may refill norco 7.5mg  as requested. Script sent to listed pharmacy by secure method. Pt notified through [facility]  Can cancel norco 10mg.

## 2023-10-31 NOTE — TELEPHONE ENCOUNTER
Patient is calling to get his Hydrocodone, wants sent right away to  on way home from work. I told him Dr Edith Frank will get to it as soon as he can.

## 2023-10-31 NOTE — TELEPHONE ENCOUNTER
Pt wants below script Norco 10/325mg canceled to pharm in [place] and send - out of stock.     Send instead to (in stock)  Larry  #[id number] - [address], [address] [facility], [phone], [address]  [address]   Phone: [phone] Fax: [phone]   Hours: Not open 24 hours

## 2023-11-01 RX ORDER — HYDROCODONE BITARTRATE AND ACETAMINOPHEN 7.5; 325 MG/1; MG/1
1 TABLET ORAL EVERY 6 HOURS PRN
Qty: 30 TABLET | Refills: 0 | Status: SHIPPED | OUTPATIENT
Start: 2023-11-01

## 2023-11-01 RX ORDER — HYDROCODONE BITARTRATE AND ACETAMINOPHEN 7.5; 325 MG/1; MG/1
1 TABLET ORAL EVERY 6 HOURS PRN
Qty: 30 TABLET | Refills: 0 | OUTPATIENT
Start: 2023-11-01

## 2023-11-01 NOTE — TELEPHONE ENCOUNTER
Please review; protocol failed. Please advise patient stating Hydrocodone out of stock at [facility] that was sent 10/31/2023, submitted different [id number] Kathie Akhtar (11:32 AM)     [place]  Medication pended - changed pharmacy        Patient called said pharmacy is out of [place]            Requested Prescriptions   Pending Prescriptions Disp Refills    HYDROcodone-acetaminophen (NORCO) 7.5-325 MG Oral Tab 30 tablet 0     Sig: Take 1 tablet by mouth every 6 (six) hours as needed for Pain. Medication may causes sedation, constipation. Not to operate heavy machinery or drive on medication.        There is no refill protocol information for this order        Recent Outpatient Visits              3 months ago Lip mass    [address], [address], Louise Hernandez MD    Office Visit    3 months ago Depression, unspecified depression type    Eboni Sloan MD    Office Visit    5 months ago Other epilepsy without status epilepticus, not intractable [facility])    [address], [address], [place], [place]    Telemedicine    11 months ago Other epilepsy without status epilepticus, not intractable ([address]    Eboni Sloan MD    Telemedicine    1 year ago Linnette Adams MD    Office Visit

## 2023-11-01 NOTE — TELEPHONE ENCOUNTER
Message noted: Chart reviewed and may refill medication as requested. Prescription sent to listed pharmacy. Pharmacy to notify patient.  Pt notified through [facility]

## 2023-11-12 RX ORDER — HYDROCODONE BITARTRATE AND ACETAMINOPHEN 10; 325 MG/1; MG/1
1 TABLET ORAL EVERY 6 HOURS PRN
Qty: 30 TABLET | Refills: 0 | Status: CANCELLED | OUTPATIENT
Start: 2023-11-12

## 2023-11-13 RX ORDER — HYDROCODONE BITARTRATE AND ACETAMINOPHEN 7.5; 325 MG/1; MG/1
1 TABLET ORAL EVERY 6 HOURS PRN
Qty: 30 TABLET | Refills: 0 | Status: SHIPPED | OUTPATIENT
Start: 2023-11-13

## 2023-11-13 NOTE — TELEPHONE ENCOUNTER
Please review. Protocol failed/ No protocol. Requested Prescriptions   Pending Prescriptions Disp Refills    HYDROcodone-acetaminophen (NORCO) 7.5-325 MG Oral Tab 30 tablet 0     Sig: Take 1 tablet by mouth every 6 (six) hours as needed for Pain. Medication may causes sedation, constipation. Not to operate heavy machinery or drive on medication.        There is no refill protocol information for this order          Recent Outpatient Visits              3 months ago Lip mass    [address], [address], Jessenia Shore MD    Office Visit    3 months ago Depression, unspecified depression type    Sumanth Mabry MD    Office Visit    6 months ago Other epilepsy without status epilepticus, not intractable [facility])    [address], [address], [place], [place]    Telemedicine    11 months ago Other epilepsy without status epilepticus, not intractable ([address]    Sumanth Mabry MD    Telemedicine    1 year ago Deacon Driscoll MD    Office Visit

## 2023-11-13 NOTE — TELEPHONE ENCOUNTER
Patient needs to have his prescription called into the pharmacy in [address]:    [address], [address], [phone], [phone].     He is out of his medication:      HYDROcodone-acetaminophen (NORCO) 7.5-325 MG Oral Tab

## 2023-11-20 RX ORDER — HYDROCODONE BITARTRATE AND ACETAMINOPHEN 7.5; 325 MG/1; MG/1
1 TABLET ORAL EVERY 6 HOURS PRN
Qty: 30 TABLET | Refills: 0 | Status: SHIPPED | OUTPATIENT
Start: 2023-11-20

## 2023-11-20 NOTE — TELEPHONE ENCOUNTER
Message noted: Chart reviewed and may refill medication as requested. Script sent to listed pharmacy by secure method.     Pt notified through [facility]

## 2023-11-20 NOTE — TELEPHONE ENCOUNTER
Please review; no protocol  Medication pended for your review and approval.     Requested Prescriptions   Pending Prescriptions Disp Refills    HYDROcodone-acetaminophen (NORCO) 7.5-325 MG Oral Tab 30 tablet 0     Sig: Take 1 tablet by mouth every 6 (six) hours as needed for Pain. Medication may causes sedation, constipation. Not to operate heavy machinery or drive on medication.        There is no refill protocol information for this order

## 2023-11-20 NOTE — TELEPHONE ENCOUNTER
Patient is calling and states that his prescription has been waiting at his pharmacy and then stated that they have been out of stock. .. He is now asking that this refilled is expedited as he is completely out of the medication at this time.

## 2023-11-27 ENCOUNTER — TELEPHONE (OUTPATIENT)
Dept: FAMILY MEDICINE CLINIC | Facility: CLINIC | Age: 28
End: 2023-11-27

## 2023-11-27 RX ORDER — HYDROCODONE BITARTRATE AND ACETAMINOPHEN 10; 325 MG/1; MG/1
1 TABLET ORAL EVERY 6 HOURS PRN
Qty: 30 TABLET | Refills: 0 | Status: SHIPPED | OUTPATIENT
Start: 2023-11-27

## 2023-11-27 NOTE — TELEPHONE ENCOUNTER
Patient calling to request hydrocodone, asking if can be sent through today, stated only has enough left for today, and will not have time to  tomorrow.

## 2023-11-27 NOTE — TELEPHONE ENCOUNTER
Please review; protocol failed. Requested Prescriptions   Pending Prescriptions Disp Refills    HYDROcodone-acetaminophen (NORCO)  MG Oral Tab 30 tablet 0     Sig: Take 1 tablet by mouth every 6 (six) hours as needed for Pain.        There is no refill protocol information for this order        Recent Outpatient Visits              3 months ago Lip mass    Jose De Los Santos, [address], Leigh Neely MD    Office Visit    3 months ago Depression, unspecified depression type    Joseline Simpson MD    Office Visit    6 months ago Other epilepsy without status epilepticus, not intractable [facility])    Jose De Los Santos, [address], [place], [place]    Telemedicine    11 months ago Other epilepsy without status epilepticus, not intractable ([facility] [address]    Joseline Simpson MD    Telemedicine    1 year ago Andre Matt MD    Office Visit

## 2023-11-27 NOTE — TELEPHONE ENCOUNTER
Message noted: Chart reviewed and may refill medication as requested. Script sent to listed pharmacy by secure method.     Pt notified through [facility]

## 2023-12-04 RX ORDER — HYDROCODONE BITARTRATE AND ACETAMINOPHEN 10; 325 MG/1; MG/1
1 TABLET ORAL EVERY 6 HOURS PRN
Qty: 30 TABLET | Refills: 0 | Status: SHIPPED | OUTPATIENT
Start: 2023-12-04 | End: 2023-12-04

## 2023-12-04 RX ORDER — HYDROCODONE BITARTRATE AND ACETAMINOPHEN 7.5; 325 MG/1; MG/1
1 TABLET ORAL EVERY 6 HOURS PRN
Qty: 30 TABLET | Refills: 0 | Status: SHIPPED | OUTPATIENT
Start: 2023-12-04

## 2023-12-04 NOTE — TELEPHONE ENCOUNTER
Please review; protocol failed. Requested Prescriptions   Pending Prescriptions Disp Refills    HYDROcodone-acetaminophen (NORCO)  MG Oral Tab 30 tablet 0     Sig: Take 1 tablet by mouth every 6 (six) hours as needed for Pain.        There is no refill protocol information for this order          Recent Outpatient Visits              4 months ago Lip mass    [address], [address], Johnie Cornejo MD    Office Visit    4 months ago Depression, unspecified depression type    Kirk Rodriguez MD    Office Visit    6 months ago Other epilepsy without status epilepticus, not intractable [facility])    [address], [address], [place], [place]    Telemedicine    12 months ago Other epilepsy without status epilepticus, not intractable ([facility] [address]    Kirk Rodriguez MD    Telemedicine    1 year ago Kavitha Carballo MD    Office Visit

## 2023-12-04 NOTE — TELEPHONE ENCOUNTER
Pt wants Dr Toro Myers to refill per his mychart 7.5 mg hydrocodone; he says they are out of stock of 10mg.     Pt is out of his medication    Preferred pharmacy: [address], [address] [place] [address], [phone], [phone]

## 2023-12-04 NOTE — TELEPHONE ENCOUNTER
Medication has no protocol.  Pended for your review and approval.     Patient calling again, hoping medication can be refilled so he can pick it up on his way home from work    Norco 10 mg tablets are currently out of stock

## 2023-12-08 RX ORDER — HYDROCODONE BITARTRATE AND ACETAMINOPHEN 7.5; 325 MG/1; MG/1
1 TABLET ORAL EVERY 6 HOURS PRN
Qty: 30 TABLET | Refills: 0 | Status: CANCELLED | OUTPATIENT
Start: 2023-12-08

## 2023-12-09 NOTE — TELEPHONE ENCOUNTER
Pharmacy    [address] [facility] [address]  [facility] [address] AT [address], [phone], [phone]      Disp Refills Start End    HYDROcodone-acetaminophen ([facility]) 7.5-325 MG Oral Tab 30 tablet 0 12/4/2023     Sig - Route: Take 1 tablet by mouth every 6 (six) hours as needed for Pain. Medication may causes sedation, constipation.  Not to operate heavy machinery or drive on medication. - Oral    Sent to pharmacy as: HYDROcodone-Acetaminophen 7.5-325 MG Oral Tablet    Earliest Fill Date: 12/4/2023    E-Prescribing Status: Receipt confirmed by pharmacy (12/4/2023  2:31 PM CST)

## 2023-12-11 RX ORDER — HYDROCODONE BITARTRATE AND ACETAMINOPHEN 7.5; 325 MG/1; MG/1
1 TABLET ORAL EVERY 6 HOURS PRN
Qty: 30 TABLET | Refills: 0 | Status: SHIPPED | OUTPATIENT
Start: 2023-12-11

## 2023-12-11 NOTE — TELEPHONE ENCOUNTER
Patient called (identified name and ),   Requesting refill of Norco which he uses for chronic migraine. Request was denied previously. He will be out of medication. Last office visit 2023 specified \"follow up in 6 to 12 months. \"    Dr Adin Fox, please advise, pended for review.

## 2023-12-12 RX ORDER — HYDROCODONE BITARTRATE AND ACETAMINOPHEN 7.5; 325 MG/1; MG/1
1 TABLET ORAL EVERY 6 HOURS PRN
Qty: 30 TABLET | Refills: 0 | OUTPATIENT
Start: 2023-12-12

## 2023-12-18 RX ORDER — HYDROCODONE BITARTRATE AND ACETAMINOPHEN 10; 325 MG/1; MG/1
1 TABLET ORAL EVERY 6 HOURS PRN
Qty: 30 TABLET | Refills: 0 | Status: SHIPPED | OUTPATIENT
Start: 2023-12-18

## 2023-12-18 RX ORDER — HYDROCODONE BITARTRATE AND ACETAMINOPHEN 7.5; 325 MG/1; MG/1
1 TABLET ORAL EVERY 6 HOURS PRN
Qty: 30 TABLET | Refills: 0 | Status: SHIPPED | OUTPATIENT
Start: 2023-12-18

## 2023-12-18 NOTE — TELEPHONE ENCOUNTER
Patient called stated Vikram Laura is out of the 7.5 mg asking for 10 mg    Advised him to check other pharmacy, stated at work and is not able to do that, please send the 10 mg    [facility] spoke with Stephanie Holliday they are out of 7.5 mg but have 10 mg    Please advise

## 2023-12-18 NOTE — TELEPHONE ENCOUNTER
Message noted: Chart reviewed and may refill medication as requested. Script sent to listed pharmacy by secure method.     Pt notified through [facility]

## 2023-12-18 NOTE — TELEPHONE ENCOUNTER
Message noted: May send requested medication for 10mg norco to requested pharmacy. Script sent to listed pharmacy by secure method. Can cancel other script.

## 2023-12-18 NOTE — TELEPHONE ENCOUNTER
Patient asking for update on script.     Also stated not feeling well but will call back when he is able to talk & schedule appointment

## 2023-12-23 RX ORDER — HYDROCODONE BITARTRATE AND ACETAMINOPHEN 10; 325 MG/1; MG/1
1 TABLET ORAL EVERY 6 HOURS PRN
Qty: 30 TABLET | Refills: 0 | Status: SHIPPED | OUTPATIENT
Start: 2023-12-23

## 2023-12-23 NOTE — TELEPHONE ENCOUNTER
Please review. Protocol failed or has no protocol. Requested Prescriptions   Pending Prescriptions Disp Refills    HYDROcodone-acetaminophen (NORCO)  MG Oral Tab 30 tablet 0     Sig: Take 1 tablet by mouth every 6 (six) hours as needed for Pain. Medication may causes sedation, constipation. Not to operate heavy machinery or drive on medication.        There is no refill protocol information for this order          Recent Outpatient Visits              4 months ago Lip mass    Rodo Macedo MD    Office Visit    4 months ago Depression, unspecified depression type    Zaria Austin MD    Office Visit    7 months ago Other epilepsy without status epilepticus, not intractable [facility])    [address], [address] [facility],[address], [facility],     Telemedicine    1 year ago Other epilepsy without status epilepticus, not intractable [facility])    Zaria Austin MD    Telemedicine    1 year ago Marielos Patiño MD    Office Visit

## 2023-12-28 RX ORDER — HYDROCODONE BITARTRATE AND ACETAMINOPHEN 10; 325 MG/1; MG/1
1 TABLET ORAL EVERY 6 HOURS PRN
Qty: 30 TABLET | Refills: 0 | Status: SHIPPED | OUTPATIENT
Start: 2023-12-28

## 2023-12-28 NOTE — TELEPHONE ENCOUNTER
Please review; protocol failed/ No protocol     Requested Prescriptions   Pending Prescriptions Disp Refills    HYDROcodone-acetaminophen (NORCO)  MG Oral Tab 30 tablet 0     Sig: Take 1 tablet by mouth every 6 (six) hours as needed for Pain. Medication may causes sedation, constipation. Not to operate heavy machinery or drive on medication.        There is no refill protocol information for this order          Recent Outpatient Visits              4 months ago Lip mass    [address] [facility], Shi Locke MD    Office Visit    5 months ago Depression, unspecified depression type    Libertda William MD    Office Visit    7 months ago Other epilepsy without status epilepticus, not intractable [facility])    [address], [address] [facility],[address], [facility] [place],     Telemedicine    1 year ago Other epilepsy without status epilepticus, not intractable [facility])    Libertad William MD    Telemedicine    1 year ago Carter Ware MD    Office Visit

## 2023-12-29 RX ORDER — HYDROCODONE BITARTRATE AND ACETAMINOPHEN 7.5; 325 MG/1; MG/1
1 TABLET ORAL EVERY 6 HOURS PRN
Qty: 30 TABLET | Refills: 0 | Status: SHIPPED | OUTPATIENT
Start: 2023-12-29

## 2023-12-29 NOTE — TELEPHONE ENCOUNTER
Patient calling again and would like to pick this up this morning if able to. He is getting out of work early and this [facility] location is close to where he is now and he has called to confirm they do have the 7.5 dose in stock.     Patient is out

## 2023-12-29 NOTE — TELEPHONE ENCOUNTER
Patient states [facility] is out of the 10 mg strength. REquesting 7.5 be sent. Pended for signature.

## 2024-01-02 DIAGNOSIS — G89.29 CHRONIC NONINTRACTABLE HEADACHE, UNSPECIFIED HEADACHE TYPE: Primary | ICD-10-CM

## 2024-01-02 DIAGNOSIS — R51.9 CHRONIC NONINTRACTABLE HEADACHE, UNSPECIFIED HEADACHE TYPE: Primary | ICD-10-CM

## 2024-01-02 RX ORDER — HYDROCODONE BITARTRATE AND ACETAMINOPHEN 7.5; 325 MG/1; MG/1
1 TABLET ORAL EVERY 6 HOURS PRN
Qty: 30 TABLET | Refills: 0 | Status: SHIPPED | OUTPATIENT
Start: 2024-01-02 | End: 2024-01-06

## 2024-01-02 NOTE — TELEPHONE ENCOUNTER
Please review; protocol failed/ No protocol     Requested Prescriptions   Pending Prescriptions Disp Refills    HYDROcodone-acetaminophen (NORCO) 7.5-325 MG Oral Tab 30 tablet 0     Sig: Take 1 tablet by mouth every 6 (six) hours as needed for Pain. Medication may causes sedation, constipation. Not to operate heavy machinery or drive on medication.       There is no refill protocol information for this order          Recent Outpatient Visits              5 months ago [facility]    [facility]    [facility]    5 months ago Depression, unspecified depression type    [facility]    [facility]    7 months ago Other epilepsy without status epilepticus, not intractable (HCC)    [facility]    [facility]icine    1 year ago Other epilepsy without status epilepticus, not intractable (HCC)    [facility] Branden Daley Nathaniel, MD    Telemedicine    1 year ago Dorsalgia    [facility] Branden Daley Nathaniel, MD    Office Visit

## 2024-01-02 NOTE — TELEPHONE ENCOUNTER
Message noted: Chart reviewed and may refill medication as requested. Script sent to listed pharmacy by secure method.    Pt notified through [employer]

## 2024-01-06 DIAGNOSIS — R51.9 CHRONIC NONINTRACTABLE HEADACHE, UNSPECIFIED HEADACHE TYPE: ICD-10-CM

## 2024-01-06 DIAGNOSIS — G89.29 CHRONIC NONINTRACTABLE HEADACHE, UNSPECIFIED HEADACHE TYPE: ICD-10-CM

## 2024-01-07 NOTE — TELEPHONE ENCOUNTER
Please review. Protocol Failed or has No Protocol.    Requested Prescriptions   Pending Prescriptions Disp Refills    HYDROcodone-acetaminophen (NORCO) 7.5-325 MG Oral Tab 30 tablet 0     Sig: Take 1 tablet by mouth every 6 (six) hours as needed for Pain. Medication may causes sedation, constipation. Not to operate heavy machinery or drive on medication.       There is no refill protocol information for this order          Recent Outpatient Visits              5 months ago [facility]    [facility]    [facility]    5 months ago Depression, unspecified depression type    [facility]    [facility]    8 months ago Other epilepsy without status epilepticus, not intractable (HCC)    [facility]    Tele[facility]icine    1 year ago Other epilepsy without status epilepticus, not intractable (HCC)    [facility]    [facility]    1 year ago Dorsalgia    [facility]    [facility] Visit

## 2024-01-08 RX ORDER — HYDROCODONE BITARTRATE AND ACETAMINOPHEN 7.5; 325 MG/1; MG/1
1 TABLET ORAL EVERY 6 HOURS PRN
Qty: 30 TABLET | Refills: 0 | Status: SHIPPED | OUTPATIENT
Start: 2024-01-08

## 2024-01-08 NOTE — TELEPHONE ENCOUNTER
Message noted: Chart reviewed and may refill medication as requested. Script sent to listed pharmacy by secure method.    Pt notified through [employer]

## 2024-01-12 DIAGNOSIS — G89.29 CHRONIC NONINTRACTABLE HEADACHE, UNSPECIFIED HEADACHE TYPE: Primary | ICD-10-CM

## 2024-01-12 DIAGNOSIS — R51.9 CHRONIC NONINTRACTABLE HEADACHE, UNSPECIFIED HEADACHE TYPE: Primary | ICD-10-CM

## 2024-01-14 NOTE — TELEPHONE ENCOUNTER
Please review. Protocol failed / No protocol.    Requested Prescriptions   Pending Prescriptions Disp Refills    HYDROcodone-acetaminophen (NORCO)  MG Oral Tab 30 tablet 0     Sig: Take 1 tablet by mouth every 6 (six) hours as needed for Pain. Medication may causes sedation, constipation. Not to operate heavy machinery or drive on medication.       There is no refill protocol information for this order          Recent Outpatient Visits              5 months ago [facility]    [facility]    [facility]    5 months ago Depression, unspecified depression type    [facility]    [facility]    8 months ago Other epilepsy without status epilepticus, not intractable ([facility])    [facility]    [facility]icine    1 year ago Other epilepsy without status epilepticus, not intractable (HCC)    [facility]    [facility]    1 year ago Dorsalgia    [facility]    [facility] Visit

## 2024-01-15 RX ORDER — HYDROCODONE BITARTRATE AND ACETAMINOPHEN 10; 325 MG/1; MG/1
1 TABLET ORAL EVERY 6 HOURS PRN
Qty: 30 TABLET | Refills: 0 | Status: SHIPPED | OUTPATIENT
Start: 2024-01-15

## 2024-01-15 NOTE — TELEPHONE ENCOUNTER
Message noted: Chart reviewed and may refill medication as requested. Script sent to listed pharmacy by secure method.    Pt notified through [employer]

## 2024-01-22 DIAGNOSIS — G89.29 CHRONIC NONINTRACTABLE HEADACHE, UNSPECIFIED HEADACHE TYPE: ICD-10-CM

## 2024-01-22 DIAGNOSIS — R51.9 CHRONIC NONINTRACTABLE HEADACHE, UNSPECIFIED HEADACHE TYPE: ICD-10-CM

## 2024-01-23 DIAGNOSIS — G89.29 CHRONIC NONINTRACTABLE HEADACHE, UNSPECIFIED HEADACHE TYPE: ICD-10-CM

## 2024-01-23 DIAGNOSIS — R51.9 CHRONIC NONINTRACTABLE HEADACHE, UNSPECIFIED HEADACHE TYPE: ICD-10-CM

## 2024-01-23 RX ORDER — HYDROCODONE BITARTRATE AND ACETAMINOPHEN 7.5; 325 MG/1; MG/1
1 TABLET ORAL EVERY 6 HOURS PRN
Qty: 30 TABLET | Refills: 0 | Status: SHIPPED | OUTPATIENT
Start: 2024-01-23 | End: 2024-01-29

## 2024-01-23 RX ORDER — HYDROCODONE BITARTRATE AND ACETAMINOPHEN 10; 325 MG/1; MG/1
1 TABLET ORAL EVERY 6 HOURS PRN
Qty: 30 TABLET | Refills: 0 | Status: SHIPPED | OUTPATIENT
Start: 2024-01-23

## 2024-01-23 NOTE — TELEPHONE ENCOUNTER
Patient calling with request for refill of [place] to [facility] pharmacy   Patient did get last refill given 1/15/24 - patient says that is 7 day supply and is out.     Please advise.

## 2024-01-23 NOTE — TELEPHONE ENCOUNTER
Patient calling ( identified name and  ) would like Norco 7.5mg to be sent to [facility] in  [place]   ( as 10mg is out of stock  )      Allergies reviewed and pharmacy confirmed    Medication has no protocol, med pended for your review/ approval

## 2024-01-23 NOTE — TELEPHONE ENCOUNTER
Spoke with pt,  verified  Pt wants to f/u on Rx ref for gen [place], pt was informed pending MD approval.   Pt was also advised due for f/u appt, he agree and stated he will schedule on line.       MICA

## 2024-01-23 NOTE — TELEPHONE ENCOUNTER
On 1/23/24  Norco 10/325 mg was sent to the [facility] pharmacy in [place].    I called the [facility] in [place] who stated they will cancel the Norco 10/325 mg/

## 2024-01-23 NOTE — TELEPHONE ENCOUNTER
Message noted:  May send norco 7.5 mg  to requested pharmacy.   Script sent to listed pharmacy by secure method.    Can cancel other script.

## 2024-01-23 NOTE — TELEPHONE ENCOUNTER
Message noted: Chart reviewed and may refill medication as requested. Script sent to listed pharmacy by secure method.    Pt notified through [employer]

## 2024-01-29 DIAGNOSIS — G89.29 CHRONIC NONINTRACTABLE HEADACHE, UNSPECIFIED HEADACHE TYPE: ICD-10-CM

## 2024-01-29 DIAGNOSIS — R51.9 CHRONIC NONINTRACTABLE HEADACHE, UNSPECIFIED HEADACHE TYPE: ICD-10-CM

## 2024-01-29 RX ORDER — HYDROCODONE BITARTRATE AND ACETAMINOPHEN 10; 325 MG/1; MG/1
1 TABLET ORAL EVERY 6 HOURS PRN
Qty: 30 TABLET | Refills: 0 | Status: CANCELLED | OUTPATIENT
Start: 2024-01-29

## 2024-01-29 RX ORDER — HYDROCODONE BITARTRATE AND ACETAMINOPHEN 7.5; 325 MG/1; MG/1
1 TABLET ORAL EVERY 6 HOURS PRN
Qty: 30 TABLET | Refills: 0 | Status: SHIPPED | OUTPATIENT
Start: 2024-01-29

## 2024-01-29 NOTE — TELEPHONE ENCOUNTER
Message noted: Chart reviewed and may refill medication as requested. Script sent to listed pharmacy by secure method.    Pt notified through [employer]    
Pt states [facility] does not have 10-325mg in stock and requesting for 7.5mg.  Per Pt, [facility] has 7.5mg in stock.  Rx sent on 1/23/24 for 7.5mg. Pt already picked up Rx and states it is only 7 days supply.    Please advise. No protocol. Pt states he is out of medication.    
36.8

## 2024-02-03 DIAGNOSIS — R51.9 CHRONIC NONINTRACTABLE HEADACHE, UNSPECIFIED HEADACHE TYPE: ICD-10-CM

## 2024-02-03 DIAGNOSIS — G89.29 CHRONIC NONINTRACTABLE HEADACHE, UNSPECIFIED HEADACHE TYPE: ICD-10-CM

## 2024-02-05 RX ORDER — HYDROCODONE BITARTRATE AND ACETAMINOPHEN 10; 325 MG/1; MG/1
1 TABLET ORAL EVERY 6 HOURS PRN
Qty: 30 TABLET | Refills: 0 | Status: SHIPPED | OUTPATIENT
Start: 2024-02-05

## 2024-02-05 NOTE — TELEPHONE ENCOUNTER
Please review; protocol failed/ has no protocol    Requested Prescriptions   Pending Prescriptions Disp Refills    HYDROcodone-acetaminophen (NORCO)  MG Oral Tab 30 tablet 0     Sig: Take 1 tablet by mouth every 6 (six) hours as needed for Pain. Medication may causes sedation, constipation. Not to operate heavy machinery or drive on medication.       There is no refill protocol information for this order        Recent Outpatient Visits              6 months ago Lip mass    [facility]    Office Visit    6 months ago Depression, unspecified depression type    [facility]    [facility] Visit    8 months ago Other epilepsy without status epilepticus, not intractable ([facility])    [facility]    Telemedicine    1 year ago Other epilepsy without status epilepticus, not intractable (HCC)    [facility] Branden Daley Nathaniel, MD    Telemedicine    1 year ago Dorsalgia    [facility] Branden Daley Nathaniel, MD    Office Visit          Future Appointments         Provider Department Appt Notes    In 3 weeks Hebert Phillips MD [facility]

## 2024-02-05 NOTE — TELEPHONE ENCOUNTER
Message noted: Chart reviewed and may refill medication as requested. Script sent to listed pharmacy by secure method.    Pt notified through [employer]

## 2024-02-11 DIAGNOSIS — R51.9 CHRONIC NONINTRACTABLE HEADACHE, UNSPECIFIED HEADACHE TYPE: ICD-10-CM

## 2024-02-11 DIAGNOSIS — G89.29 CHRONIC NONINTRACTABLE HEADACHE, UNSPECIFIED HEADACHE TYPE: ICD-10-CM

## 2024-02-13 RX ORDER — HYDROCODONE BITARTRATE AND ACETAMINOPHEN 10; 325 MG/1; MG/1
1 TABLET ORAL EVERY 6 HOURS PRN
Qty: 30 TABLET | Refills: 0 | Status: SHIPPED | OUTPATIENT
Start: 2024-02-13 | End: 2024-02-16

## 2024-02-13 NOTE — TELEPHONE ENCOUNTER
Message noted: Chart reviewed and may refill medication as requested. Script sent to listed pharmacy by secure method.    Pt notified through [employer]

## 2024-02-16 DIAGNOSIS — R51.9 CHRONIC NONINTRACTABLE HEADACHE, UNSPECIFIED HEADACHE TYPE: ICD-10-CM

## 2024-02-16 DIAGNOSIS — G89.29 CHRONIC NONINTRACTABLE HEADACHE, UNSPECIFIED HEADACHE TYPE: ICD-10-CM

## 2024-02-16 RX ORDER — HYDROCODONE BITARTRATE AND ACETAMINOPHEN 10; 325 MG/1; MG/1
1 TABLET ORAL EVERY 6 HOURS PRN
Qty: 30 TABLET | Refills: 0 | OUTPATIENT
Start: 2024-02-16

## 2024-02-16 RX ORDER — HYDROCODONE BITARTRATE AND ACETAMINOPHEN 10; 325 MG/1; MG/1
1 TABLET ORAL EVERY 6 HOURS PRN
Qty: 30 TABLET | Refills: 0 | Status: SHIPPED | OUTPATIENT
Start: 2024-02-19

## 2024-02-16 NOTE — TELEPHONE ENCOUNTER
Patient called in and states he gets this prescription weekly.     Pended for review. Due Monday, pended with start date of 2/19

## 2024-02-17 NOTE — TELEPHONE ENCOUNTER
Message noted: Chart reviewed and may refill medication as requested. Script sent to listed pharmacy by secure method.    Pt notified through [employer]

## 2024-02-23 DIAGNOSIS — R51.9 CHRONIC NONINTRACTABLE HEADACHE, UNSPECIFIED HEADACHE TYPE: ICD-10-CM

## 2024-02-23 DIAGNOSIS — G89.29 CHRONIC NONINTRACTABLE HEADACHE, UNSPECIFIED HEADACHE TYPE: ICD-10-CM

## 2024-02-24 ENCOUNTER — TELEPHONE (OUTPATIENT)
Dept: FAMILY MEDICINE CLINIC | Facility: CLINIC | Age: 29
End: 2024-02-24

## 2024-02-24 RX ORDER — HYDROCODONE BITARTRATE AND ACETAMINOPHEN 10; 325 MG/1; MG/1
1 TABLET ORAL EVERY 6 HOURS PRN
Qty: 30 TABLET | Refills: 0 | Status: SHIPPED | OUTPATIENT
Start: 2024-02-24 | End: 2024-03-01

## 2024-02-24 RX ORDER — HYDROCODONE BITARTRATE AND ACETAMINOPHEN 10; 325 MG/1; MG/1
1 TABLET ORAL EVERY 6 HOURS PRN
Qty: 30 TABLET | Refills: 0 | Status: SHIPPED | OUTPATIENT
Start: 2024-02-24 | End: 2024-02-24

## 2024-02-24 NOTE — TELEPHONE ENCOUNTER
This medication could not be e-scribed likely due to the nationwide [facility]  issue announced on 2/22/24.    Provider, please print the prescription instead.   Clinical staff, please let the patient know once the printed rx is available for  at your office.        Please review; protocol failed/no protocol.     Requested Prescriptions   Pending Prescriptions Disp Refills    HYDROcodone-acetaminophen (NORCO)  MG Oral Tab 30 tablet 0     Sig: Take 1 tablet by mouth every 6 (six) hours as needed for Pain. Medication may causes sedation, constipation. Not to operate heavy machinery or drive on medication.       Controlled Substance Medication Failed - 2/23/2024  6:52 AM        Failed - This medication is a controlled substance - forward to provider to refill              Recent Outpatient Visits              6 months ago [facility] mass    [facility]    Office Visit    6 months ago Depression, unspecified depression type    [facility]    Office Visit    9 months ago Other epilepsy without status epilepticus, not intractable (HCC)    [facility]    Telemedicine    1 year ago Other epilepsy without status epilepticus, not intractable (HCC)    [facility]    Telemedicine    1 year ago Dorsalgia    [facility]    Office Visit             Future Appointments         Provider Department Appt Notes    In 2 days Hebert Phillips MD [facility]

## 2024-02-24 NOTE — TELEPHONE ENCOUNTER
Message noted: Chart reviewed and may refill medication as requested. Script sent to listed pharmacy by secure method.    Pt notified through [employer]

## 2024-02-26 ENCOUNTER — TELEPHONE (OUTPATIENT)
Dept: FAMILY MEDICINE CLINIC | Facility: CLINIC | Age: 29
End: 2024-02-26

## 2024-02-26 ENCOUNTER — TELEMEDICINE (OUTPATIENT)
Dept: FAMILY MEDICINE CLINIC | Facility: CLINIC | Age: 29
End: 2024-02-26
Payer: MEDICAID

## 2024-02-26 DIAGNOSIS — G89.29 CHRONIC NONINTRACTABLE HEADACHE, UNSPECIFIED HEADACHE TYPE: Primary | ICD-10-CM

## 2024-02-26 DIAGNOSIS — R51.9 CHRONIC NONINTRACTABLE HEADACHE, UNSPECIFIED HEADACHE TYPE: Primary | ICD-10-CM

## 2024-02-26 NOTE — TELEPHONE ENCOUNTER
Dr Jacqueline NINO    Patient (name and  verified) called to cancel video visit appt today at 2pm, stating that he cannot wait for the provider any longer as he needs to get back to work.     I called the office, spoke with Mercedes to notify however the appt was already marked arrived so unable to cancel this appt.     Patient disconnected the line and did not reschedule appt during the call.

## 2024-02-26 NOTE — PROGRESS NOTES
Subjective:   Patient ID: Lb Aguilar is a 28 year old male.    This visit is conducted using Telemedicine with live, interactive video and audio during this Coronavirus pandemic.    Please note that the following visit was completed using two-way, real-time interactive audio and/or video communication.  This has been done in good jose r to provide continuity of care in the best interest of the provider-patient relationship, due to the ongoing public health crisis/national emergency and because of restrictions of visitation.  There are limitations of this visit as no physical exam could be performed.  Every conscious effort was taken to allow for sufficient and adequate time.  This billing was spent on reviewing labs, medications, radiology tests and decision making.  Appropriate medical decision-making and tests are ordered as detailed in the plan of care above    Virtual Telephone Check-In    Lb Aguilar verbally consents to a Virtual/Telephone Check-In visit on 02/26/24.  Patient has been referred to the [facility] website at [email or web address] to review the yearly Consent to Treat document.    Patient understands and accepts financial responsibility for any deductible, co-insurance and/or co-pays associated with this service.    Duration of the service: 5 minutes      Summary of topics discussed: chronic headaches    Patient is here for follow up for chronic medical issues- chronic headaches. The patient is compliant with medications and no side effects. There are no acute issues and patient does not need refills. The patient states medications have been helpful and effective.    Pt feeling well. Does see neurology also.     Hebert Phillips MD                  History/Other:   Review of Systems  Current Outpatient Medications   Medication Sig Dispense Refill    HYDROcodone-acetaminophen (NORCO) 7.5-325 MG Oral Tab Take 1 tablet by mouth every 6 (six) hours as needed for Pain. Medication may causes sedation,  constipation. Not to operate heavy machinery or drive on medication. 30 tablet 0    HYDROcodone-acetaminophen (NORCO)  MG Oral Tab Take 1 tablet by mouth every 6 (six) hours as needed for Pain. Medication may causes sedation, constipation. Not to operate heavy machinery or drive on medication. 30 tablet 0    HYDROcodone-acetaminophen (NORCO) 7.5-325 MG Oral Tab Take 1 tablet by mouth every 6 (six) hours as needed for Pain. Medication may causes sedation, constipation. Not to operate heavy machinery or drive on medication. 30 tablet 0    amphetamine-dextroamphetamine 30 MG Oral Tab Take 1 tablet (30 mg total) by mouth daily. 30 tablet 0    levetiracetam 750 MG Oral Tab Take 1 tablet (750 mg total) by mouth 2 (two) times daily. 180 tablet 3     Allergies:  Allergies   Allergen Reactions    Benadryl [Diphenhydramine] NAUSEA AND VOMITING    Tramadol ANXIETY and ITCHING       Objective:   Physical Exam  Neurological:      General: No focal deficit present.      Mental Status: He is alert and oriented to person, place, and time.   Psychiatric:         Mood and Affect: Mood normal.         Behavior: Behavior normal.         Assessment & Plan:   1. Chronic nonintractable headache, unspecified headache type:  - Stable: medication reviewed  To continue present treatment; discussed weaning off of norco; To call if problems; Routine follow up in 6  months.    VIDEO VISIT done.           No orders of the defined types were placed in this encounter.      Meds This Visit:  Requested Prescriptions      No prescriptions requested or ordered in this encounter       Imaging & Referrals:  None

## 2024-03-01 DIAGNOSIS — G89.29 CHRONIC NONINTRACTABLE HEADACHE, UNSPECIFIED HEADACHE TYPE: ICD-10-CM

## 2024-03-01 DIAGNOSIS — R51.9 CHRONIC NONINTRACTABLE HEADACHE, UNSPECIFIED HEADACHE TYPE: ICD-10-CM

## 2024-03-04 RX ORDER — HYDROCODONE BITARTRATE AND ACETAMINOPHEN 10; 325 MG/1; MG/1
1 TABLET ORAL EVERY 6 HOURS PRN
Qty: 30 TABLET | Refills: 0 | Status: SHIPPED | OUTPATIENT
Start: 2024-03-04

## 2024-03-04 NOTE — TELEPHONE ENCOUNTER
Please review; protocol failed/ has no protocol    Requested Prescriptions   Pending Prescriptions Disp Refills    HYDROcodone-acetaminophen (NORCO)  MG Oral Tab 30 tablet 0     Sig: Take 1 tablet by mouth every 6 (six) hours as needed for Pain. Medication may causes sedation, constipation. Not to operate heavy machinery or drive on medication.       Controlled Substance Medication Failed - 3/1/2024  2:10 PM        Failed - This medication is a controlled substance - forward to provider to refill           Recent Outpatient Visits              1 week ago Chronic nonintractable headache, unspecified headache type    [facility]    [facility]medicine    7 months ago Lip mass    [facility]    [facility]    7 months ago Depression, unspecified depression type    [facility]    Office Visit    9 months ago Other epilepsy without status epilepticus, not intractable (HCC)    [facility]    Tele[facility]    1 year ago Other epilepsy without status epilepticus, not intractable (HCC)    [facility]    Telemedicine

## 2024-03-04 NOTE — TELEPHONE ENCOUNTER
Message noted: Chart reviewed and may refill medication as requested. Script sent to listed pharmacy by secure method.    Pt notified through [employer]

## 2024-03-08 DIAGNOSIS — G89.29 CHRONIC NONINTRACTABLE HEADACHE, UNSPECIFIED HEADACHE TYPE: ICD-10-CM

## 2024-03-08 DIAGNOSIS — R51.9 CHRONIC NONINTRACTABLE HEADACHE, UNSPECIFIED HEADACHE TYPE: ICD-10-CM

## 2024-03-09 RX ORDER — HYDROCODONE BITARTRATE AND ACETAMINOPHEN 10; 325 MG/1; MG/1
1 TABLET ORAL EVERY 6 HOURS PRN
Qty: 30 TABLET | Refills: 0 | Status: SHIPPED | OUTPATIENT
Start: 2024-03-09

## 2024-03-09 NOTE — TELEPHONE ENCOUNTER
Message noted: Chart reviewed and may refill medication as requested. Script sent to listed pharmacy by secure method.    Pt notified through [employer]

## 2024-03-12 ENCOUNTER — NURSE TRIAGE (OUTPATIENT)
Dept: FAMILY MEDICINE CLINIC | Facility: CLINIC | Age: 29
End: 2024-03-12

## 2024-03-12 NOTE — TELEPHONE ENCOUNTER
Action Requested: Summary for Provider     []  Critical Lab, Recommendations Needed  [] Need Additional Advice  []   FYI    []   Need Orders  [] Need Medications Sent to Pharmacy  []  Other     SUMMARY: Patient requesting an appt to be seen today.  Patient reports sinus pressure with a headache.   States he has had chills yesterday and today.  Unsure about fever due to not having a thermometer.  Denies: congestion, cough  Appt made with available provider. Patient advised if symptoms worsen to go to IC/ED.   Patient verbalized understanding.     Future Appointments   Date Time Provider Department Center   3/12/2024  4:00 PM Alesia Whittington APRN [facility]         Reason for call: Sinus Problem  Onset: 3/8        Reason for Disposition   Patient wants to be seen    Protocols used: Sinus Pain and Congestion-A-[place]

## 2024-03-15 DIAGNOSIS — G89.29 CHRONIC NONINTRACTABLE HEADACHE, UNSPECIFIED HEADACHE TYPE: ICD-10-CM

## 2024-03-15 DIAGNOSIS — R51.9 CHRONIC NONINTRACTABLE HEADACHE, UNSPECIFIED HEADACHE TYPE: ICD-10-CM

## 2024-03-18 RX ORDER — HYDROCODONE BITARTRATE AND ACETAMINOPHEN 10; 325 MG/1; MG/1
1 TABLET ORAL EVERY 6 HOURS PRN
Qty: 30 TABLET | Refills: 0 | Status: SHIPPED | OUTPATIENT
Start: 2024-03-18

## 2024-03-18 NOTE — TELEPHONE ENCOUNTER
Protocol Failed/ No Protocol    Requested Prescriptions   Pending Prescriptions Disp Refills    HYDROcodone-acetaminophen (NORCO)  MG Oral Tab 30 tablet 0     Sig: Take 1 tablet by mouth every 6 (six) hours as needed for Pain. Medication may causes sedation, constipation. Not to operate heavy machinery or drive on medication.       Controlled Substance Medication Failed - 3/15/2024  4:01 PM        Failed - This medication is a controlled substance - forward to provider to refill               Recent Outpatient Visits              2 weeks ago Chronic nonintractable headache, unspecified headache type    [facility]    [facility]    7 months ago Lip mass    [facility] Fuentes Reece MD    Office Visit    7 months ago Depression, unspecified depression type    [facility]    Office Visit    10 months ago Other epilepsy without status epilepticus, not intractable (HCC)    [facility]    Telemedicine    1 year ago Other epilepsy without status epilepticus, not intractable (HCC)    [facility]    [facility]

## 2024-03-18 NOTE — TELEPHONE ENCOUNTER
Message noted: Chart reviewed and may refill medication as requested. Script sent to listed pharmacy by secure method.    Pt notified through [employer]

## 2024-03-21 ENCOUNTER — OFFICE VISIT (OUTPATIENT)
Dept: FAMILY MEDICINE CLINIC | Facility: CLINIC | Age: 29
End: 2024-03-21
Payer: MEDICAID

## 2024-03-21 VITALS
DIASTOLIC BLOOD PRESSURE: 82 MMHG | WEIGHT: 205 LBS | HEART RATE: 78 BPM | SYSTOLIC BLOOD PRESSURE: 118 MMHG | HEIGHT: 73 IN | OXYGEN SATURATION: 98 % | RESPIRATION RATE: 16 BRPM | BODY MASS INDEX: 27.17 KG/M2 | TEMPERATURE: 98 F

## 2024-03-21 DIAGNOSIS — J01.40 ACUTE NON-RECURRENT PANSINUSITIS: Primary | ICD-10-CM

## 2024-03-21 DIAGNOSIS — H61.21 IMPACTED CERUMEN OF RIGHT EAR: ICD-10-CM

## 2024-03-21 PROCEDURE — 99213 OFFICE O/P EST LOW 20 MIN: CPT

## 2024-03-21 RX ORDER — AMOXICILLIN AND CLAVULANATE POTASSIUM 875; 125 MG/1; MG/1
1 TABLET, FILM COATED ORAL 2 TIMES DAILY
Qty: 14 TABLET | Refills: 0 | Status: SHIPPED | OUTPATIENT
Start: 2024-03-21 | End: 2024-03-28

## 2024-03-21 RX ORDER — IBUPROFEN 600 MG/1
600 TABLET ORAL EVERY 6 HOURS PRN
Qty: 30 TABLET | Refills: 0 | Status: SHIPPED | OUTPATIENT
Start: 2024-03-21

## 2024-03-21 NOTE — PROGRESS NOTES
CHIEF COMPLAINT:     Chief Complaint   Patient presents with    Ear Problem    Bump       HPI:   Lb Aguilar is a 28 year old male who presents for cold symptoms since the beginning of last week. Symptoms have progressed into sinus congestion and been worsening since onset. Sinus congestion/pain is described as a pressure.  Patient also reports fatigue, headaches, ear pressure, and insomnia. Patient denies fever, dizziness, or  dental pain. Patient reports swollen lymph nodes to right axilla area last week, resolved currently. Has treated symptoms with OTC medications.       Current Outpatient Medications   Medication Sig Dispense Refill    amoxicillin clavulanate 875-125 MG Oral Tab Take 1 tablet by mouth 2 (two) times daily for 7 days. 14 tablet 0    ibuprofen 600 MG Oral Tab Take 1 tablet (600 mg total) by mouth every 6 (six) hours as needed for Pain. 30 tablet 0    HYDROcodone-acetaminophen (NORCO) 7.5-325 MG Oral Tab Take 1 tablet by mouth every 6 (six) hours as needed for Pain. Medication may causes sedation, constipation. Not to operate heavy machinery or drive on medication. 30 tablet 0    HYDROcodone-acetaminophen (NORCO)  MG Oral Tab Take 1 tablet by mouth every 6 (six) hours as needed for Pain. Medication may causes sedation, constipation. Not to operate heavy machinery or drive on medication. 30 tablet 0    amphetamine-dextroamphetamine 30 MG Oral Tab Take 1 tablet (30 mg total) by mouth daily. 30 tablet 0    levetiracetam 750 MG Oral Tab Take 1 tablet (750 mg total) by mouth 2 (two) times daily. 180 tablet 3      Past Medical History:   Diagnosis Date    Anxiety     Gilbert disease 2016    Panic [facility] 04/2017    [facility]     Reactive hypoglycemia     Simple febrile seizure (HCC) 1994      Past Surgical History:   Procedure Laterality Date    WISDOM TEETH REMOVED        Family History   Problem Relation Age of Onset    Hypertension Father     Tremor Father     Diabetes  Mother       Social History     Socioeconomic History    Marital status: Single   Tobacco Use    Smoking status: Every Day     Packs/day: 0.50     Years: 10.00     Additional pack years: 0.00     Total pack years: 5.00     Types: Cigarettes    Smokeless tobacco: Never   Vaping Use    Vaping Use: Never used   Substance and Sexual Activity    Alcohol use: Yes     Comment: special occassions only    Drug use: Yes     Types: Cannabis     Comment: smoke marijuana   Other Topics Concern    Caffeine Concern Yes     Comment: coffee pop daily    Exercise No   Social History Narrative    The patient does not use an assistive device..      The patient does live in a home with stairs.         REVIEW OF SYSTEMS:   GENERAL:  denies  diminished appetite  SKIN: no rashes or abnormal skin lesions  HEENT: See HPI.    LUNGS: denies shortness of breath or wheezing, See HPI  CARDIOVASCULAR: denies chest pain or palpitations   GI: denies N/V/C or abdominal pain  NEURO: + sinus headaches.  No numbness or tingling in face.    EXAM:   /82   Pulse 78   Temp 97.8 °F (36.6 °C)   Resp 16   Ht 6' 1\" (1.854 m)   Wt 205 lb (93 kg)   SpO2 98%   BMI 27.05 kg/m²   Physical Exam  Vitals reviewed.   Constitutional:       General: He is not in acute distress.     Appearance: Normal appearance. He is not ill-appearing or toxic-appearing.   HENT:      Head: Normocephalic and atraumatic.      Right Ear: Hearing and external ear normal. There is impacted cerumen.      Left Ear: Hearing, ear canal and external ear normal. A middle ear effusion is present. Tympanic membrane is not injected, perforated, erythematous, retracted or bulging.      Nose: Congestion present.      Mouth/Throat:      Mouth: Mucous membranes are moist.      Pharynx: Oropharynx is clear. Uvula midline. Posterior oropharyngeal erythema present. No pharyngeal swelling, oropharyngeal exudate or uvula swelling.      Comments: +cobblestoning  Eyes:      Conjunctiva/sclera:  Conjunctivae normal.   Cardiovascular:      Rate and Rhythm: Normal rate and regular rhythm.      Pulses: Normal pulses.      Heart sounds: Normal heart sounds.   Pulmonary:      Effort: Pulmonary effort is normal. No respiratory distress.      Breath sounds: Normal breath sounds. No stridor. No wheezing or rhonchi.   Musculoskeletal:         General: Normal range of motion.      Cervical back: Normal range of motion and neck supple. No rigidity.   Lymphadenopathy:      Cervical: No cervical adenopathy.   Skin:     General: Skin is warm and dry.      Capillary Refill: Capillary refill takes less than 2 seconds.      Findings: No rash.   Neurological:      General: No focal deficit present.      Mental Status: He is alert and oriented to person, place, and time.   Psychiatric:         Mood and Affect: Mood normal.         Behavior: Behavior normal.       ASSESSMENT AND PLAN:   Lb Aguilar is a 28 year old male who presents with URI symptoms that are consistent with:      ASSESSMENT:  Encounter Diagnoses   Name Primary?    Acute non-recurrent pansinusitis Yes    Impacted cerumen of right ear      PLAN:  Education provided.  Questions answered.  Reassurance given. Will treat with antibiotics. Medications as listed below.  Risks, benefits, side effects of medication addressed and explained. Advised patient to continue supportive care: maintain hydration and symptom management with OTC medications. Recommend OTC Debrox for cerumen impaction.  Comfort Care as listed in Patient Instructions. The patient indicates understanding of these issues and agrees to the plan. The patient is asked to f/u with PCP if sx's persist or worsen. Patient requested note for employer, copy provided and handed to patient upon discharge.    Meds & Refills for this Visit:  Requested Prescriptions     Signed Prescriptions Disp Refills    amoxicillin clavulanate 875-125 MG Oral Tab 14 tablet 0     Sig: Take 1 tablet by mouth 2 (two) times daily  for 7 days.    ibuprofen 600 MG Oral Tab 30 tablet 0     Sig: Take 1 tablet (600 mg total) by mouth every 6 (six) hours as needed for Pain.

## 2024-03-22 DIAGNOSIS — R51.9 CHRONIC NONINTRACTABLE HEADACHE, UNSPECIFIED HEADACHE TYPE: ICD-10-CM

## 2024-03-22 DIAGNOSIS — G89.29 CHRONIC NONINTRACTABLE HEADACHE, UNSPECIFIED HEADACHE TYPE: ICD-10-CM

## 2024-03-22 NOTE — TELEPHONE ENCOUNTER
Please review; protocol failed/ No protocol     Requested Prescriptions   Pending Prescriptions Disp Refills    HYDROcodone-acetaminophen (NORCO)  MG Oral Tab 30 tablet 0     Sig: Take 1 tablet by mouth every 6 (six) hours as needed for Pain. Medication may causes sedation, constipation. Not to operate heavy machinery or drive on medication.       Controlled Substance Medication Failed - 3/22/2024  8:28 AM        Failed - This medication is a controlled substance - forward to provider to refill             Recent Outpatient Visits              Yesterday Acute non-recurrent pansinusitis    [facility], [facility], [facility] [place]    Office Visit    3 weeks ago Chronic nonintractable headache, unspecified headache type    [facility]    [facility]    7 months ago Lip mass    [facility]    Office Visit    7 months ago Depression, unspecified depression type    [facility]    [facility] Visit    10 months ago Other epilepsy without status epilepticus, not intractable (HCC)    [facility]    [facility]

## 2024-03-23 RX ORDER — HYDROCODONE BITARTRATE AND ACETAMINOPHEN 10; 325 MG/1; MG/1
1 TABLET ORAL EVERY 6 HOURS PRN
Qty: 30 TABLET | Refills: 0 | Status: SHIPPED | OUTPATIENT
Start: 2024-03-23

## 2024-03-27 DIAGNOSIS — G89.29 CHRONIC NONINTRACTABLE HEADACHE, UNSPECIFIED HEADACHE TYPE: ICD-10-CM

## 2024-03-27 DIAGNOSIS — R51.9 CHRONIC NONINTRACTABLE HEADACHE, UNSPECIFIED HEADACHE TYPE: ICD-10-CM

## 2024-03-29 NOTE — TELEPHONE ENCOUNTER
Please review. Rx failed/no protocol.    Patient's dispense hx: 03/23/24, 03/18/24, and 03/09/24.    Requested Prescriptions   Pending Prescriptions Disp Refills    HYDROcodone-acetaminophen (NORCO)  MG Oral Tab 30 tablet 0     Sig: Take 1 tablet by mouth every 6 (six) hours as needed for Pain. Medication may causes sedation, constipation. Not to operate heavy machinery or drive on medication.       Controlled Substance Medication Failed - 3/27/2024  1:26 PM        Failed - This medication is a controlled substance - forward to provider to refill               Recent Outpatient Visits              1 week ago Acute non-recurrent pansinusitis    [facility], [facility], [facility] [place] [facility]    Office Visit    1 month ago Chronic nonintractable headache, unspecified headache type    [facility]    8 months ago Lip mass    [facility] [place]    Office Visit    8 months ago Depression, unspecified depression type    [facility]    [facility]    10 months ago Other epilepsy without status epilepticus, not intractable (HCC)    [facility]    [facility]

## 2024-03-30 RX ORDER — HYDROCODONE BITARTRATE AND ACETAMINOPHEN 10; 325 MG/1; MG/1
1 TABLET ORAL EVERY 6 HOURS PRN
Qty: 30 TABLET | Refills: 0 | Status: SHIPPED | OUTPATIENT
Start: 2024-03-30

## 2024-03-30 NOTE — TELEPHONE ENCOUNTER
Message noted: Chart reviewed and may refill medication as requested. Script sent to listed pharmacy by secure method.    Pt notified through [employer]

## 2024-04-04 DIAGNOSIS — R51.9 CHRONIC NONINTRACTABLE HEADACHE, UNSPECIFIED HEADACHE TYPE: ICD-10-CM

## 2024-04-04 DIAGNOSIS — G89.29 CHRONIC NONINTRACTABLE HEADACHE, UNSPECIFIED HEADACHE TYPE: ICD-10-CM

## 2024-04-05 RX ORDER — HYDROCODONE BITARTRATE AND ACETAMINOPHEN 10; 325 MG/1; MG/1
1 TABLET ORAL EVERY 6 HOURS PRN
Qty: 30 TABLET | Refills: 0 | Status: SHIPPED | OUTPATIENT
Start: 2024-04-05

## 2024-04-05 NOTE — TELEPHONE ENCOUNTER
Message noted: Chart reviewed and may refill medication as requested. Script sent to listed pharmacy by secure method.    Pt notified through [employer]

## 2024-04-05 NOTE — TELEPHONE ENCOUNTER
Please review. Protocol failed or has no protocol.    Requested Prescriptions   Pending Prescriptions Disp Refills    HYDROcodone-acetaminophen (NORCO)  MG Oral Tab 30 tablet 0     Sig: Take 1 tablet by mouth every 6 (six) hours as needed for Pain. Medication may causes sedation, constipation. Not to operate heavy machinery or drive on medication.       Controlled Substance Medication Failed - 4/4/2024  3:20 PM        Failed - This medication is a controlled substance - forward to provider to refill             Recent Outpatient Visits              2 weeks ago Acute non-recurrent pansinusitis    [facility], [facility], [facility] [place]    Office Visit    1 month ago Chronic nonintractable headache, unspecified headache type    [facility]    8 months ago Lip mass    [place]    Office Visit    8 months ago Depression, unspecified depression type    [facility]    [facility]    10 months ago Other epilepsy without status epilepticus, not intractable (HCC)    [facility]    Telemedicine

## 2024-04-09 DIAGNOSIS — G89.29 CHRONIC NONINTRACTABLE HEADACHE, UNSPECIFIED HEADACHE TYPE: ICD-10-CM

## 2024-04-09 DIAGNOSIS — R51.9 CHRONIC NONINTRACTABLE HEADACHE, UNSPECIFIED HEADACHE TYPE: ICD-10-CM

## 2024-04-10 RX ORDER — HYDROCODONE BITARTRATE AND ACETAMINOPHEN 10; 325 MG/1; MG/1
1 TABLET ORAL EVERY 6 HOURS PRN
Qty: 30 TABLET | Refills: 0 | Status: SHIPPED | OUTPATIENT
Start: 2024-04-10

## 2024-04-10 NOTE — TELEPHONE ENCOUNTER
Message noted: Chart reviewed and may refill medication as requested. Script sent to listed pharmacy by secure method.    Pt notified through [employer]

## 2024-04-10 NOTE — TELEPHONE ENCOUNTER
Please review. Protocol failed or has no protocol.    Requested Prescriptions   Pending Prescriptions Disp Refills    HYDROcodone-acetaminophen (NORCO)  MG Oral Tab 30 tablet 0     Sig: Take 1 tablet by mouth every 6 (six) hours as needed for Pain. Medication may causes sedation, constipation. Not to operate heavy machinery or drive on medication.       Controlled Substance Medication Failed - 4/9/2024  9:07 PM        Failed - This medication is a controlled substance - forward to provider to refill             Recent Outpatient Visits              2 weeks ago Acute non-recurrent pansinusitis    [facility], [facility], [facility] [place]    Office Visit    1 month ago Chronic nonintractable headache, unspecified headache type    [facility]    [facility]    8 months ago Lip mass    [facility] [place] [facility]    Office Visit    8 months ago Depression, unspecified depression type    [facility]    [facility] Visit    11 months ago Other epilepsy without status epilepticus, not intractable (HCC)    [facility] Chase Watkins DO    Telemedicine

## 2024-04-15 DIAGNOSIS — G89.29 CHRONIC NONINTRACTABLE HEADACHE, UNSPECIFIED HEADACHE TYPE: ICD-10-CM

## 2024-04-15 DIAGNOSIS — R51.9 CHRONIC NONINTRACTABLE HEADACHE, UNSPECIFIED HEADACHE TYPE: ICD-10-CM

## 2024-04-16 RX ORDER — HYDROCODONE BITARTRATE AND ACETAMINOPHEN 10; 325 MG/1; MG/1
1 TABLET ORAL EVERY 6 HOURS PRN
Qty: 30 TABLET | Refills: 0 | Status: SHIPPED | OUTPATIENT
Start: 2024-04-16

## 2024-04-16 NOTE — TELEPHONE ENCOUNTER
Please review. Protocol failed or has no protocol.    Requested Prescriptions   Pending Prescriptions Disp Refills    HYDROcodone-acetaminophen (NORCO)  MG Oral Tab 30 tablet 0     Sig: Take 1 tablet by mouth every 6 (six) hours as needed for Pain. Medication may causes sedation, constipation. Not to operate heavy machinery or drive on medication.       Controlled Substance Medication Failed - 4/15/2024  5:02 PM        Failed - This medication is a controlled substance - forward to provider to refill             Recent Outpatient Visits              3 weeks ago Acute non-recurrent pansinusitis    [facility], [facility], [facility], [place]    Office Visit    1 month ago Chronic nonintractable headache, unspecified headache type    [facility]    8 months ago Lip mass    [facility]    Office Visit    8 months ago Depression, unspecified depression type    [facility]    [facility]    11 months ago Other epilepsy without status epilepticus, not intractable (HCC)    [facility] Chase Watkins DO    Telemedicine

## 2024-04-16 NOTE — TELEPHONE ENCOUNTER
Message noted: Chart reviewed and may refill medication as requested. Script sent to listed pharmacy by secure method.    Pt notified through [employer]

## 2024-04-22 DIAGNOSIS — G89.29 CHRONIC NONINTRACTABLE HEADACHE, UNSPECIFIED HEADACHE TYPE: ICD-10-CM

## 2024-04-22 DIAGNOSIS — R51.9 CHRONIC NONINTRACTABLE HEADACHE, UNSPECIFIED HEADACHE TYPE: ICD-10-CM

## 2024-04-23 RX ORDER — HYDROCODONE BITARTRATE AND ACETAMINOPHEN 10; 325 MG/1; MG/1
1 TABLET ORAL EVERY 6 HOURS PRN
Qty: 30 TABLET | Refills: 0 | Status: SHIPPED | OUTPATIENT
Start: 2024-04-26

## 2024-04-23 NOTE — TELEPHONE ENCOUNTER
Please review. Rx failed/no protocol.    Patient's dispense hx: 04/18/24, 04/11/24, and 04/05/24. (Due date: 04/26/24, okay to 04/24/24) - please adjust if not appropriate.  Last Rx written: 04/18/24  LOV: 02/26/2024 (Telehealth)      Requested Prescriptions   Pending Prescriptions Disp Refills    HYDROcodone-acetaminophen (NORCO)  MG Oral Tab 30 tablet 0     Sig: Take 1 tablet by mouth every 6 (six) hours as needed for Pain. Medication may causes sedation, constipation. Not to operate heavy machinery or drive on medication.       Controlled Substance Medication Failed - 4/22/2024  6:42 AM        Failed - This medication is a controlled substance - forward to provider to refill               Recent Outpatient Visits              1 month ago Acute non-recurrent pansinusitis    [facility]    Office Visit    1 month ago Chronic nonintractable headache, unspecified headache type    [facility]    [facility]    8 months ago Lip mass    [facility] Fuentes Minor MD    Office Visit    8 months ago Depression, unspecified depression type    [facility]    [facility]    11 months ago Other epilepsy without status epilepticus, not intractable (HCC)    [facility] Chase Watkins DO    Telemedicine

## 2024-04-23 NOTE — TELEPHONE ENCOUNTER
Message noted: Chart reviewed and may refill medication as requested. Script sent to listed pharmacy by secure method.    Pt notified through [employer]

## 2024-04-28 DIAGNOSIS — R51.9 CHRONIC NONINTRACTABLE HEADACHE, UNSPECIFIED HEADACHE TYPE: ICD-10-CM

## 2024-04-28 DIAGNOSIS — G89.29 CHRONIC NONINTRACTABLE HEADACHE, UNSPECIFIED HEADACHE TYPE: ICD-10-CM

## 2024-04-30 RX ORDER — HYDROCODONE BITARTRATE AND ACETAMINOPHEN 10; 325 MG/1; MG/1
1 TABLET ORAL EVERY 6 HOURS PRN
Qty: 30 TABLET | Refills: 0 | Status: SHIPPED | OUTPATIENT
Start: 2024-04-30

## 2024-04-30 NOTE — TELEPHONE ENCOUNTER
Please review; protocol failed. Or has no protocol    Requested Prescriptions   Pending Prescriptions Disp Refills    HYDROcodone-acetaminophen (NORCO)  MG Oral Tab 30 tablet 0     Sig: Take 1 tablet by mouth every 6 (six) hours as needed for Pain. Medication may causes sedation, constipation. Not to operate heavy machinery or drive on medication.       Controlled Substance Medication Failed - 4/28/2024  4:45 PM        Failed - This medication is a controlled substance - forward to provider to refill              Recent Outpatient Visits              1 month ago Acute non-recurrent pansinusitis    [facility], [facility], [facility], [place]    Office Visit    2 months ago Chronic nonintractable headache, unspecified headache type    [facility]    9 months ago Lip mass    [facility]    Office Visit    9 months ago Depression, unspecified depression type    [facility]    [facility]    11 months ago Other epilepsy without status epilepticus, not intractable (HCC)    [facility]    Telemedicine

## 2024-04-30 NOTE — TELEPHONE ENCOUNTER
Please review; protocol failed. Or has no protocol    Requested Prescriptions   Pending Prescriptions Disp Refills    HYDROcodone-acetaminophen (NORCO)  MG Oral Tab 30 tablet 0     Sig: Take 1 tablet by mouth every 6 (six) hours as needed for Pain. Medication may causes sedation, constipation. Not to operate heavy machinery or drive on medication.       Controlled Substance Medication Failed - 4/28/2024  4:45 PM        Failed - This medication is a controlled substance - forward to provider to refill              Recent Outpatient Visits              1 month ago Acute non-recurrent pansinusitis    [facility] [place], [facility], [facility] [place]    Office Visit    2 months ago Chronic nonintractable headache, unspecified headache type    [facility]    [facility]    9 months ago Lip mass    [facility]    [facility] Visit    9 months ago Depression, unspecified depression type    [facility]    [facility]    11 months ago Other epilepsy without status epilepticus, not intractable (HCC)    [facility]    Telemedicine

## 2024-04-30 NOTE — TELEPHONE ENCOUNTER
Message noted: Chart reviewed and may refill medication as requested. Script sent to listed pharmacy by secure method.    Pt notified through [employer]

## 2024-05-06 DIAGNOSIS — G89.29 CHRONIC NONINTRACTABLE HEADACHE, UNSPECIFIED HEADACHE TYPE: ICD-10-CM

## 2024-05-06 DIAGNOSIS — R51.9 CHRONIC NONINTRACTABLE HEADACHE, UNSPECIFIED HEADACHE TYPE: ICD-10-CM

## 2024-05-08 RX ORDER — HYDROCODONE BITARTRATE AND ACETAMINOPHEN 10; 325 MG/1; MG/1
1 TABLET ORAL EVERY 6 HOURS PRN
Qty: 30 TABLET | Refills: 0 | Status: SHIPPED | OUTPATIENT
Start: 2024-05-08

## 2024-05-08 NOTE — TELEPHONE ENCOUNTER
Please review. Protocol Failed; No Protocol    Recent fills: 4/18/2024, 4/25/2024, 5/2/2024 quantity 30 each  Last Rx written: 4/30/2024  Last office visit: 7/31/2023 Televisit : 2/26/2024      Requested Prescriptions   Pending Prescriptions Disp Refills    HYDROcodone-acetaminophen (NORCO)  MG Oral Tab 30 tablet 0     Sig: Take 1 tablet by mouth every 6 (six) hours as needed for Pain. Medication may causes sedation, constipation. Not to operate heavy machinery or drive on medication.       Controlled Substance Medication Failed - 5/6/2024 10:59 PM        Failed - This medication is a controlled substance - forward to provider to refill                 Recent Outpatient Visits              1 month ago Acute non-recurrent pansinusitis    [facility], [facility], [facility] [place]    Office Visit    2 months ago Chronic nonintractable headache, unspecified headache type    [facility]    [facility]    9 months ago Lip mass    [facility]    [facility] Visit    9 months ago Depression, unspecified depression type    [facility] [place]    Office Visit    12 months ago Other epilepsy without status epilepticus, not intractable (HCC)    [facility]    Tele[facility]

## 2024-05-08 NOTE — TELEPHONE ENCOUNTER
Message noted: Chart reviewed and may refill medication as requested. Script sent to listed pharmacy by secure method.    Pt notified through [employer]

## 2024-05-13 DIAGNOSIS — G89.29 CHRONIC NONINTRACTABLE HEADACHE, UNSPECIFIED HEADACHE TYPE: ICD-10-CM

## 2024-05-13 DIAGNOSIS — R51.9 CHRONIC NONINTRACTABLE HEADACHE, UNSPECIFIED HEADACHE TYPE: ICD-10-CM

## 2024-05-13 RX ORDER — HYDROCODONE BITARTRATE AND ACETAMINOPHEN 10; 325 MG/1; MG/1
1 TABLET ORAL EVERY 6 HOURS PRN
Qty: 30 TABLET | Refills: 0 | Status: CANCELLED | OUTPATIENT
Start: 2024-05-13

## 2024-05-14 RX ORDER — HYDROCODONE BITARTRATE AND ACETAMINOPHEN 10; 325 MG/1; MG/1
1 TABLET ORAL EVERY 6 HOURS PRN
Qty: 30 TABLET | Refills: 0 | Status: CANCELLED | OUTPATIENT
Start: 2024-06-14 | End: 2024-06-22

## 2024-05-14 RX ORDER — HYDROCODONE BITARTRATE AND ACETAMINOPHEN 10; 325 MG/1; MG/1
1 TABLET ORAL EVERY 6 HOURS PRN
Qty: 30 TABLET | Refills: 0 | Status: CANCELLED | OUTPATIENT
Start: 2024-06-05 | End: 2024-06-13

## 2024-05-14 NOTE — TELEPHONE ENCOUNTER
Please review.  Protocol failed/has no protocol.    Recent fills each quantity 30 : 3/4, 3/9, 3/18, 3/23, 3/30, 4/5, 4/11, 4/18, 4/25, 5/2, 5/8  *patient consistently fills, then requests next fill.  At earliest Due May 27    How long should quantity 30 last patient?     Last prescription written: 5/8/2024  Last office visit: 7/31/2023, telemedicine visit 2/26/2024    Pended with panel for quantity 30 to last 8 days - that is maximum usage per instructions of 1 tablet per 6 hours as needed.        Going to beginning of year, patient is not due for refill until 6/13/2024

## 2024-05-15 RX ORDER — HYDROCODONE BITARTRATE AND ACETAMINOPHEN 10; 325 MG/1; MG/1
1 TABLET ORAL EVERY 6 HOURS PRN
Qty: 30 TABLET | Refills: 0 | Status: SHIPPED | OUTPATIENT
Start: 2024-05-27 | End: 2024-06-04

## 2024-05-15 NOTE — TELEPHONE ENCOUNTER
Message noted: Chart reviewed and may refill medication as requested. Script sent to listed pharmacy by secure method.    Pt notified through [employer]

## 2024-06-03 DIAGNOSIS — G89.29 CHRONIC NONINTRACTABLE HEADACHE, UNSPECIFIED HEADACHE TYPE: ICD-10-CM

## 2024-06-03 DIAGNOSIS — R51.9 CHRONIC NONINTRACTABLE HEADACHE, UNSPECIFIED HEADACHE TYPE: ICD-10-CM

## 2024-06-05 DIAGNOSIS — G89.29 CHRONIC NONINTRACTABLE HEADACHE, UNSPECIFIED HEADACHE TYPE: ICD-10-CM

## 2024-06-05 DIAGNOSIS — R51.9 CHRONIC NONINTRACTABLE HEADACHE, UNSPECIFIED HEADACHE TYPE: ICD-10-CM

## 2024-06-06 RX ORDER — HYDROCODONE BITARTRATE AND ACETAMINOPHEN 10; 325 MG/1; MG/1
1 TABLET ORAL EVERY 6 HOURS PRN
Qty: 30 TABLET | Refills: 0 | Status: SHIPPED | OUTPATIENT
Start: 2024-06-06 | End: 2024-06-14

## 2024-06-06 NOTE — TELEPHONE ENCOUNTER
Message noted: Chart reviewed and may refill medication as requested. Script sent to listed pharmacy by secure method.    Pt notified through [employer]

## 2024-06-06 NOTE — TELEPHONE ENCOUNTER
Please Review. Protocol Failed; No Protocol     HYDROcodone-acetaminophen  MG Oral Tab  Recent fills: Quantity: 30  05/27/2024 05/08/2024 05/02/2024                                                                        Patient is due  Last Rx written: 05/15/2024  Last Office Visit: 07/31/2023      Requested Prescriptions   Pending Prescriptions Disp Refills    HYDROcodone-acetaminophen  MG Oral Tab 30 tablet 0     Sig: Take 1 tablet by mouth every 6 (six) hours as needed for Pain.       Controlled Substance Medication Failed - 6/3/2024 12:44 AM        Failed - This medication is a controlled substance - forward to provider to refill                 Recent Outpatient Visits              2 months ago Acute non-recurrent pansinusitis    [facility], [facility], [facility] [place]    Office Visit    3 months ago Chronic nonintractable headache, unspecified headache type    [facility]    [facility]    10 months ago Lip mass    [facility] [place] [facility]    Office Visit    10 months ago Depression, unspecified depression type    [facility]    Office Visit    1 year ago Other epilepsy without status epilepticus, not intractable (HCC)    [facility]    Telemedicine

## 2024-06-10 DIAGNOSIS — R51.9 CHRONIC NONINTRACTABLE HEADACHE, UNSPECIFIED HEADACHE TYPE: ICD-10-CM

## 2024-06-10 DIAGNOSIS — G89.29 CHRONIC NONINTRACTABLE HEADACHE, UNSPECIFIED HEADACHE TYPE: ICD-10-CM

## 2024-06-10 RX ORDER — HYDROCODONE BITARTRATE AND ACETAMINOPHEN 10; 325 MG/1; MG/1
1 TABLET ORAL EVERY 6 HOURS PRN
Qty: 30 TABLET | Refills: 0 | OUTPATIENT
Start: 2024-06-10

## 2024-06-13 RX ORDER — HYDROCODONE BITARTRATE AND ACETAMINOPHEN 10; 325 MG/1; MG/1
1 TABLET ORAL EVERY 6 HOURS PRN
Qty: 30 TABLET | Refills: 0 | Status: SHIPPED | OUTPATIENT
Start: 2024-06-13 | End: 2024-06-21

## 2024-06-13 NOTE — TELEPHONE ENCOUNTER
Message noted: Chart reviewed and may refill medication as requested. Script sent to listed pharmacy by secure method.    Pt notified through [employer]

## 2024-06-13 NOTE — TELEPHONE ENCOUNTER
Please Review. Protocol Failed; No Protocol     Recent fills: Quantity: 30  06/06/2024 05/27/2024 05/08/2024                                                                    Patient is due: 06/13/2024  Last Rx written: 06/06/2024  Last Office Visit: 07/31/2023        Requested Prescriptions   Pending Prescriptions Disp Refills    HYDROcodone-acetaminophen  MG Oral Tab 30 tablet 0     Sig: Take 1 tablet by mouth every 6 (six) hours as needed for Pain.       Controlled Substance Medication Failed - 6/10/2024  5:22 PM        Failed - This medication is a controlled substance - forward to provider to refill                 Recent Outpatient Visits              2 months ago Acute non-recurrent pansinusitis    [facility]    Office Visit    3 months ago Chronic nonintractable headache, unspecified headache type    [facility]    [facility]    10 months ago Lip mass    [facility]    Office Visit    10 months ago Depression, unspecified depression type    [facility]    [facility] Visit    1 year ago Other epilepsy without status epilepticus, not intractable (HCC)    [facility]    [facility]

## 2024-06-17 DIAGNOSIS — G89.29 CHRONIC NONINTRACTABLE HEADACHE, UNSPECIFIED HEADACHE TYPE: ICD-10-CM

## 2024-06-17 DIAGNOSIS — R51.9 CHRONIC NONINTRACTABLE HEADACHE, UNSPECIFIED HEADACHE TYPE: ICD-10-CM

## 2024-06-19 RX ORDER — HYDROCODONE BITARTRATE AND ACETAMINOPHEN 10; 325 MG/1; MG/1
1 TABLET ORAL EVERY 6 HOURS PRN
Qty: 30 TABLET | Refills: 0 | Status: SHIPPED | OUTPATIENT
Start: 2024-06-19 | End: 2024-06-27

## 2024-06-19 NOTE — TELEPHONE ENCOUNTER
Message noted: Chart reviewed and may refill medication as requested. Script sent to listed pharmacy by secure method.    Pt notified through [employer]

## 2024-06-19 NOTE — TELEPHONE ENCOUNTER
Please review. Protocol Failed; No Protocol      Recent fills: 5/27/2024, 6/6/2024, 6/13/2024  Last Rx written: 6/13/2024  Last office visit: 7/31/2023  Televisit: 2/26/2024          Requested Prescriptions   Pending Prescriptions Disp Refills    HYDROcodone-acetaminophen  MG Oral Tab 30 tablet 0     Sig: Take 1 tablet by mouth every 6 (six) hours as needed for Pain.       Controlled Substance Medication Failed - 6/17/2024  4:02 PM        Failed - This medication is a controlled substance - forward to provider to refill                 Recent Outpatient Visits              3 months ago Acute non-recurrent pansinusitis    [facility]    Office Visit    3 months ago Chronic nonintractable headache, unspecified headache type    [facility]    [facility]    10 months ago Lip mass    [facility] Z[facility]    Office Visit    10 months ago Depression, unspecified depression type    [facility]    [facility] Visit    1 year ago Other epilepsy without status epilepticus, not intractable (HCC)    [facility]    [facility]

## 2024-06-24 DIAGNOSIS — R51.9 CHRONIC NONINTRACTABLE HEADACHE, UNSPECIFIED HEADACHE TYPE: ICD-10-CM

## 2024-06-24 DIAGNOSIS — G89.29 CHRONIC NONINTRACTABLE HEADACHE, UNSPECIFIED HEADACHE TYPE: ICD-10-CM

## 2024-06-26 RX ORDER — HYDROCODONE BITARTRATE AND ACETAMINOPHEN 10; 325 MG/1; MG/1
1 TABLET ORAL EVERY 6 HOURS PRN
Qty: 30 TABLET | Refills: 0 | Status: SHIPPED | OUTPATIENT
Start: 2024-06-26 | End: 2024-07-04

## 2024-06-26 NOTE — TELEPHONE ENCOUNTER
Please review; protocol failed/ has no protocol      Recent fills: 06/19/2024,06/13/2024,06/06/2024  Last Rx written: 06/19/2024  Last Office Visit : 07/31/2023/ Last Telemedicine 02/26/2024    Recent Visits  Date Type Provider Dept   07/31/23 Office Visit Hebert Phillips MD [facility]         Requested Prescriptions   Pending Prescriptions Disp Refills    HYDROcodone-acetaminophen  MG Oral Tab 30 tablet 0     Sig: Take 1 tablet by mouth every 6 (six) hours as needed for Pain.       Controlled Substance Medication Failed - 6/24/2024  1:43 PM        Failed - This medication is a controlled substance - forward to provider to refill           Recent Outpatient Visits              3 months ago Acute non-recurrent pansinusitis    [facility], [facility] [place]    Office Visit    4 months ago Chronic nonintractable headache, unspecified headache type    [facility]    [facility]    10 months ago Lip mass    [facility]    Office Visit    11 months ago Depression, unspecified depression type    [facility]    Office Visit    1 year ago Other epilepsy without status epilepticus, not intractable (HCC)    [facility] [place] [facility]    Telemedicine

## 2024-06-26 NOTE — TELEPHONE ENCOUNTER
Message noted: Chart reviewed and may refill medication as requested. Script sent to listed pharmacy by secure method.    Pt notified through [email or web address]

## 2024-07-01 DIAGNOSIS — G89.29 CHRONIC NONINTRACTABLE HEADACHE, UNSPECIFIED HEADACHE TYPE: ICD-10-CM

## 2024-07-01 DIAGNOSIS — R51.9 CHRONIC NONINTRACTABLE HEADACHE, UNSPECIFIED HEADACHE TYPE: ICD-10-CM

## 2024-07-03 RX ORDER — HYDROCODONE BITARTRATE AND ACETAMINOPHEN 10; 325 MG/1; MG/1
1 TABLET ORAL EVERY 6 HOURS PRN
Qty: 30 TABLET | Refills: 0 | Status: SHIPPED | OUTPATIENT
Start: 2024-07-03 | End: 2024-07-11

## 2024-07-03 NOTE — TELEPHONE ENCOUNTER
Please Review. Protocol Failed; No Protocol     Recent fills: Quantity: 30  06/26/2024 06/19/2024 06/13/2024 06/06/2024 05/27/2024                                                                      Patient is due  Last Rx written: 06/26/2024  Last Office Visit: 07/31/2023        Requested Prescriptions   Pending Prescriptions Disp Refills    HYDROcodone-acetaminophen  MG Oral Tab 30 tablet 0     Sig: Take 1 tablet by mouth every 6 (six) hours as needed for Pain.       Controlled Substance Medication Failed - 7/1/2024  8:37 AM        Failed - This medication is a controlled substance - forward to provider to refill                 Recent Outpatient Visits              3 months ago Acute non-recurrent pansinusitis    [facility]    Office Visit    4 months ago Chronic nonintractable headache, unspecified headache type    [facility]    [facility]    11 months ago [facility] mass    [facility]    Office Visit    11 months ago Depression, unspecified depression type    [facility]    Office Visit    1 year ago Other epilepsy without status epilepticus, not intractable (HCC)    [facility]    [facility]

## 2024-07-03 NOTE — TELEPHONE ENCOUNTER
Message noted: Chart reviewed and may refill medication as requested. Script sent to listed pharmacy by secure method.    Pt notified through [employer]

## 2024-07-07 DIAGNOSIS — G89.29 CHRONIC NONINTRACTABLE HEADACHE, UNSPECIFIED HEADACHE TYPE: ICD-10-CM

## 2024-07-07 DIAGNOSIS — R51.9 CHRONIC NONINTRACTABLE HEADACHE, UNSPECIFIED HEADACHE TYPE: ICD-10-CM

## 2024-07-10 ENCOUNTER — TELEPHONE (OUTPATIENT)
Dept: FAMILY MEDICINE CLINIC | Facility: CLINIC | Age: 29
End: 2024-07-10

## 2024-07-10 RX ORDER — HYDROCODONE BITARTRATE AND ACETAMINOPHEN 10; 325 MG/1; MG/1
1 TABLET ORAL EVERY 6 HOURS PRN
Qty: 30 TABLET | Refills: 0 | Status: SHIPPED | OUTPATIENT
Start: 2024-07-10 | End: 2024-07-18

## 2024-07-10 NOTE — TELEPHONE ENCOUNTER
Message noted: Chart reviewed and may refill medication as requested. Script sent to listed pharmacy by secure method.    Pt notified through [employer]

## 2024-07-10 NOTE — TELEPHONE ENCOUNTER
Patient requesting refill       [id number] - [place], [place] - [address] AT [facility] [address]     Medication Detail    Medication Quantity Refills Start End   HYDROcodone-acetaminophen  MG Oral Tab 30 tablet 0 7/3/2024 7/11/2024   Sig:   Take 1 tablet by mouth every 6 (six) hours as needed for Pain.     Route:   Oral     Note to Pharmacy:   Lb Aguilar is responsible for safekeeping of all of his unfilled prescriptions as well as his previously dispensed medications.     PRN Reason(s):   Pain     Earliest Fill Date:   7/3/2024     Order #:   [id number]

## 2024-07-10 NOTE — TELEPHONE ENCOUNTER
Please Review. Protocol Failed; No Protocol     Recent fills: Quantity: 30  07/03/2024 06/26/2024 06/19/2024                                                                      Patient is due  Last Rx written: 07/03/2024  Last Office Visit: 07/31/2024      Requested Prescriptions   Pending Prescriptions Disp Refills    HYDROcodone-acetaminophen  MG Oral Tab 30 tablet 0     Sig: Take 1 tablet by mouth every 6 (six) hours as needed for Pain.       Controlled Substance Medication Failed - 7/7/2024  1:19 PM        Failed - This medication is a controlled substance - forward to provider to refill                 Recent Outpatient Visits              3 months ago Acute non-recurrent pansinusitis    [facility]    Office Visit    4 months ago Chronic nonintractable headache, unspecified headache type    [facility]    [facility]    11 months ago Lip mass    [facility]    Office Visit    11 months ago Depression, unspecified depression type    [facility]    Office Visit    1 year ago Other epilepsy without status epilepticus, not intractable (HCC)    [facility]    [facility]

## 2024-07-15 DIAGNOSIS — R51.9 CHRONIC NONINTRACTABLE HEADACHE, UNSPECIFIED HEADACHE TYPE: ICD-10-CM

## 2024-07-15 DIAGNOSIS — G89.29 CHRONIC NONINTRACTABLE HEADACHE, UNSPECIFIED HEADACHE TYPE: ICD-10-CM

## 2024-07-17 RX ORDER — HYDROCODONE BITARTRATE AND ACETAMINOPHEN 10; 325 MG/1; MG/1
1 TABLET ORAL EVERY 6 HOURS PRN
Qty: 30 TABLET | Refills: 0 | Status: SHIPPED | OUTPATIENT
Start: 2024-07-17 | End: 2024-07-25

## 2024-07-17 NOTE — TELEPHONE ENCOUNTER
Please review; protocol failed/No Protocol    Recent Fills: 06/26/2024, 07/03/2024, 07/10/2024    Last Rx Written: 07/10/2024    Last Office Visit: 07/31/2023    Requested Prescriptions   Pending Prescriptions Disp Refills    HYDROcodone-acetaminophen  MG Oral Tab 30 tablet 0     Sig: Take 1 tablet by mouth every 6 (six) hours as needed for Pain.       Controlled Substance Medication Failed - 7/17/2024  1:21 PM        Failed - This medication is a controlled substance - forward to provider to refill             Recent Outpatient Visits              3 months ago Acute non-recurrent pansinusitis    [facility]    Office Visit    4 months ago Chronic nonintractable headache, unspecified headache type    [facility]    [facility]    11 months ago Lip mass    [facility]    Office Visit    11 months ago Depression, unspecified depression type    [facility]    Office Visit    1 year ago Other epilepsy without status epilepticus, not intractable (HCC)    [facility] Bass DO    Telemedicine

## 2024-07-17 NOTE — TELEPHONE ENCOUNTER
Message noted: Chart reviewed and may refill medication as requested. Script sent to listed pharmacy by secure method.    Pt notified through [employer]

## 2024-07-17 NOTE — TELEPHONE ENCOUNTER
Patient asked if this  refill could be expedited. He is almost out. Rn relayed our refill policy for future reference.     Refill time, please assist.

## 2024-07-21 DIAGNOSIS — R51.9 CHRONIC NONINTRACTABLE HEADACHE, UNSPECIFIED HEADACHE TYPE: ICD-10-CM

## 2024-07-21 DIAGNOSIS — G89.29 CHRONIC NONINTRACTABLE HEADACHE, UNSPECIFIED HEADACHE TYPE: ICD-10-CM

## 2024-07-23 NOTE — TELEPHONE ENCOUNTER
Please review. Protocol Failed; No Protocol      Recent fills: 7/3/2024, 7/10/2024, 7/17/2024  Last Rx written: 7/17/2024  Last office visit: 7/31/2023        Requested Prescriptions   Pending Prescriptions Disp Refills    HYDROcodone-acetaminophen  MG Oral Tab 30 tablet 0     Sig: Take 1 tablet by mouth every 6 (six) hours as needed for Pain.       Controlled Substance Medication Failed - 7/21/2024  9:23 AM        Failed - This medication is a controlled substance - forward to provider to refill                 Recent Outpatient Visits              4 months ago Acute non-recurrent pansinusitis    [facility] [place] [facility]    Office Visit    4 months ago Chronic nonintractable headache, unspecified headache type    [facility]    [facility]    11 months ago Lip mass    [facility] [place] [facility]    Office Visit    11 months ago Depression, unspecified depression type    [facility]    Office Visit    1 year ago Other epilepsy without status epilepticus, not intractable (HCC)    [facility] [place] [facility]    Telemedicine

## 2024-07-24 RX ORDER — HYDROCODONE BITARTRATE AND ACETAMINOPHEN 10; 325 MG/1; MG/1
1 TABLET ORAL EVERY 6 HOURS PRN
Qty: 30 TABLET | Refills: 0 | Status: SHIPPED | OUTPATIENT
Start: 2024-07-24 | End: 2024-08-01

## 2024-07-24 NOTE — TELEPHONE ENCOUNTER
Message noted: Chart reviewed and may refill medication as requested. Script sent to listed pharmacy by secure method.    Pt notified through [employer]

## 2024-07-30 DIAGNOSIS — G89.29 CHRONIC NONINTRACTABLE HEADACHE, UNSPECIFIED HEADACHE TYPE: ICD-10-CM

## 2024-07-30 DIAGNOSIS — R51.9 CHRONIC NONINTRACTABLE HEADACHE, UNSPECIFIED HEADACHE TYPE: ICD-10-CM

## 2024-08-01 RX ORDER — HYDROCODONE BITARTRATE AND ACETAMINOPHEN 10; 325 MG/1; MG/1
1 TABLET ORAL EVERY 6 HOURS PRN
Qty: 30 TABLET | Refills: 0 | Status: SHIPPED | OUTPATIENT
Start: 2024-08-01 | End: 2024-08-09

## 2024-08-01 NOTE — TELEPHONE ENCOUNTER
Protocol Failed/ No Protocol    Requested Prescriptions   Pending Prescriptions Disp Refills    HYDROcodone-acetaminophen  MG Oral Tab 30 tablet 0     Sig: Take 1 tablet by mouth every 6 (six) hours as needed for Pain.       Controlled Substance Medication Failed - 8/1/2024 11:36 AM        Failed - This medication is a controlled substance - forward to provider to refill               Recent Outpatient Visits              4 months ago Acute non-recurrent pansinusitis    [facility] Kings[facility]    Office Visit    5 months ago Chronic nonintractable headache, unspecified headache type    [facility]    [facility]    1 year ago Lip mass    [facility] Fuentes Reece MD    Office Visit    1 year ago Depression, unspecified depression type    [facility]    Office Visit    1 year ago Other epilepsy without status epilepticus, not intractable (HCC)    [facility]    Telemedicine

## 2024-08-01 NOTE — TELEPHONE ENCOUNTER
Message noted: Chart reviewed and may refill medication as requested. Script sent to listed pharmacy by secure method.    Pt notified through [employer]

## 2024-08-01 NOTE — TELEPHONE ENCOUNTER
Spoke with patient, Date of Birth verified  He is looking for refill for gen norco for his migraine   Patient was advised to make a follow up or physical with PCP, he stated he will call back as he is currently driving.   Last Telemed 2-26-24  Routed to [facility] to run for protocol , thanks.           Requested Prescriptions     Pending Prescriptions Disp Refills    HYDROcodone-acetaminophen  MG Oral Tab 30 tablet 0     Sig: Take 1 tablet by mouth every 6 (six) hours as needed for Pain.       Last Office Visit with PCP: 2/26/2024

## 2024-08-05 DIAGNOSIS — R51.9 CHRONIC NONINTRACTABLE HEADACHE, UNSPECIFIED HEADACHE TYPE: ICD-10-CM

## 2024-08-05 DIAGNOSIS — G89.29 CHRONIC NONINTRACTABLE HEADACHE, UNSPECIFIED HEADACHE TYPE: ICD-10-CM

## 2024-08-07 RX ORDER — HYDROCODONE BITARTRATE AND ACETAMINOPHEN 10; 325 MG/1; MG/1
1 TABLET ORAL EVERY 6 HOURS PRN
Qty: 30 TABLET | Refills: 0 | Status: SHIPPED | OUTPATIENT
Start: 2024-08-07 | End: 2024-08-15

## 2024-08-07 NOTE — TELEPHONE ENCOUNTER
Message noted: Chart reviewed and may refill medication as requested. Script sent to listed pharmacy by secure method.    Pt notified through [employer]

## 2024-08-07 NOTE — TELEPHONE ENCOUNTER
Please Review. Protocol Failed; No Protocol   .  Recent fills: Quantity: 30  08/01/2024 07/24/2024 07/17/2024                                                                      Patient is due: 08/08/2024  Last Rx written: 08/01/2024  Last Office Visit: 07/31/2023        Requested Prescriptions   Pending Prescriptions Disp Refills    HYDROcodone-acetaminophen  MG Oral Tab 30 tablet 0     Sig: Take 1 tablet by mouth every 6 (six) hours as needed for Pain.       Controlled Substance Medication Failed - 8/5/2024  6:09 AM        Failed - This medication is a controlled substance - forward to provider to refill                 Recent Outpatient Visits              4 months ago Acute non-recurrent pansinusitis    [facility], [facility], [facility] [place] Pay[place] [facility]    Office Visit    5 months ago Chronic nonintractable headache, unspecified headache type    [facility]    [facility]    1 year ago Lip mass    [facility]    Office Visit    1 year ago Depression, unspecified depression type    [facility]    Office Visit    1 year ago Other epilepsy without status epilepticus, not intractable (HCC)    [facility]    Telemed[facility]

## 2024-08-11 DIAGNOSIS — R51.9 CHRONIC NONINTRACTABLE HEADACHE, UNSPECIFIED HEADACHE TYPE: ICD-10-CM

## 2024-08-11 DIAGNOSIS — G89.29 CHRONIC NONINTRACTABLE HEADACHE, UNSPECIFIED HEADACHE TYPE: ICD-10-CM

## 2024-08-14 RX ORDER — HYDROCODONE BITARTRATE AND ACETAMINOPHEN 10; 325 MG/1; MG/1
1 TABLET ORAL EVERY 6 HOURS PRN
Qty: 30 TABLET | Refills: 0 | Status: SHIPPED | OUTPATIENT
Start: 2024-08-14 | End: 2024-08-19

## 2024-08-14 NOTE — TELEPHONE ENCOUNTER
Please review.  Protocol failed / Has no protocol.    Norco 10mg Recent fills each quantity 30 : 6/6, 6/13, 6/19, 6/26, 7/3, 7/10, 7/17, 7/24, 8/1, 8/7   DUE 8/21/24  Last prescription written: 8/7/24  Last office visit: 7/31/23 Last visit was Telemedicine visit on 2/26/24.     [employer] message sent to patient to schedule an office visit with Primary Care Provider.      Requested Prescriptions   Pending Prescriptions Disp Refills    HYDROcodone-acetaminophen  MG Oral Tab 30 tablet 0     Sig: Take 1 tablet by mouth every 6 (six) hours as needed for Pain.       Controlled Substance Medication Failed - 8/11/2024  1:26 PM        Failed - This medication is a controlled substance - forward to provider to refill             Recent Outpatient Visits              4 months ago Acute non-recurrent pansinusitis    [facility], Walk-In Clinic, [facility] Elif Bates APRN    Office Visit    5 months ago Chronic nonintractable headache, unspecified headache type    [facility]    [facility]    1 year ago Lip mass    [facility]    Office Visit    1 year ago Depression, unspecified depression type    [facility]    Office Visit    1 year ago Other epilepsy without status epilepticus, not intractable (HCC)    [facility] Chase Watkins DO    Telemedicine

## 2024-08-14 NOTE — TELEPHONE ENCOUNTER
Message noted: Chart reviewed and may refill medication as requested. Script sent to listed pharmacy by secure method.    Pt notified through [employer]

## 2024-08-19 DIAGNOSIS — R51.9 CHRONIC NONINTRACTABLE HEADACHE, UNSPECIFIED HEADACHE TYPE: ICD-10-CM

## 2024-08-19 DIAGNOSIS — G89.29 CHRONIC NONINTRACTABLE HEADACHE, UNSPECIFIED HEADACHE TYPE: ICD-10-CM

## 2024-08-21 RX ORDER — HYDROCODONE BITARTRATE AND ACETAMINOPHEN 10; 325 MG/1; MG/1
1 TABLET ORAL EVERY 6 HOURS PRN
Qty: 30 TABLET | Refills: 0 | Status: SHIPPED | OUTPATIENT
Start: 2024-08-21 | End: 2024-08-29

## 2024-08-21 NOTE — TELEPHONE ENCOUNTER
Protocol Failed/ No Protocol    Requested Prescriptions   Pending Prescriptions Disp Refills    HYDROcodone-acetaminophen  MG Oral Tab 30 tablet 0     Sig: Take 1 tablet by mouth every 6 (six) hours as needed for Pain.       Controlled Substance Medication Failed - 8/19/2024 11:08 PM        Failed - This medication is a controlled substance - forward to provider to refill               Recent Outpatient Visits              5 months ago Acute non-recurrent pansinusitis    [facility], [facility], [facility], Elif Kingsley APRN    Office Visit    5 months ago Chronic nonintractable headache, unspecified headache type    [facility]    [facility]    1 year ago Lip mass    [facility], Fuentes Reece MD    Office Visit    1 year ago Depression, unspecified depression type    [facility]    Office Visit    1 year ago Other epilepsy without status epilepticus, not intractable (HCC)    [facility]    Telemedicine

## 2024-08-21 NOTE — TELEPHONE ENCOUNTER
Message noted: Chart reviewed and may refill medication as requested. Script sent to listed pharmacy by secure method.    Pt notified through [employer]

## 2024-08-26 DIAGNOSIS — R51.9 CHRONIC NONINTRACTABLE HEADACHE, UNSPECIFIED HEADACHE TYPE: ICD-10-CM

## 2024-08-26 DIAGNOSIS — G89.29 CHRONIC NONINTRACTABLE HEADACHE, UNSPECIFIED HEADACHE TYPE: ICD-10-CM

## 2024-08-27 RX ORDER — HYDROCODONE BITARTRATE AND ACETAMINOPHEN 10; 325 MG/1; MG/1
1 TABLET ORAL EVERY 6 HOURS PRN
Qty: 30 TABLET | Refills: 0 | Status: SHIPPED | OUTPATIENT
Start: 2024-08-27 | End: 2024-09-04

## 2024-08-27 NOTE — TELEPHONE ENCOUNTER
Please Review. Protocol Failed; No Protocol   Medication(s) to Refill:   Requested Prescriptions     Pending Prescriptions Disp Refills    HYDROcodone-acetaminophen  MG Oral Tab 30 tablet 0     Sig: Take 1 tablet by mouth every 6 (six) hours as needed for Pain.         Reason for Medication Refill being sent to Provider / Reason Protocol Failed:  [x] Controlled Substance       Quantity: 30  Last Time Medication was Filled:  08/21/2024, 08/14/2024, 08/07/2024  Last Time Medication was Written :08/21/2024      Last Office Visit : 07/31/2023        Requested Prescriptions   Pending Prescriptions Disp Refills    HYDROcodone-acetaminophen  MG Oral Tab 30 tablet 0     Sig: Take 1 tablet by mouth every 6 (six) hours as needed for Pain.       Controlled Substance Medication Failed - 8/26/2024 11:08 PM        Failed - This medication is a controlled substance - forward to provider to refill                 Recent Outpatient Visits              5 months ago Acute non-recurrent pansinusitis    [facility], [place]    Office Visit    6 months ago Chronic nonintractable headache, unspecified headache type    [facility]    [facility]    1 year ago Lip mass    [facility] [place] [facility] [facility]    Office Visit    1 year ago Depression, unspecified depression type    [facility]    Office Visit    1 year ago Other epilepsy without status epilepticus, not intractable (HCC)    [facility] [place] Bass DO    Telemedicine

## 2024-08-27 NOTE — TELEPHONE ENCOUNTER
Message noted: Chart reviewed and may refill medication as requested. Script sent to listed pharmacy by secure method.    Pt notified through [employer]

## 2024-08-31 DIAGNOSIS — R51.9 CHRONIC NONINTRACTABLE HEADACHE, UNSPECIFIED HEADACHE TYPE: ICD-10-CM

## 2024-08-31 DIAGNOSIS — G89.29 CHRONIC NONINTRACTABLE HEADACHE, UNSPECIFIED HEADACHE TYPE: ICD-10-CM

## 2024-09-03 RX ORDER — HYDROCODONE BITARTRATE AND ACETAMINOPHEN 10; 325 MG/1; MG/1
1 TABLET ORAL EVERY 6 HOURS PRN
Qty: 30 TABLET | Refills: 0 | Status: SHIPPED | OUTPATIENT
Start: 2024-09-03 | End: 2024-09-11

## 2024-09-03 NOTE — TELEPHONE ENCOUNTER
Please review. Protocol Failed; No Protocol      Recent fills: 8/14/2024, 8/21/2024, 8/27/2024  quantity 30 each  Last Rx written: 8/27/2024  Last office visit: 7/31/2023    Future Appointments  Date Type Provider Dept   10/07/24 Appointment Hebert Phillips MD [facility]   Showing future appointments within next 150 days with a meds authorizing provider and meeting all other requirements        Requested Prescriptions   Pending Prescriptions Disp Refills    HYDROcodone-acetaminophen  MG Oral Tab 30 tablet 0     Sig: Take 1 tablet by mouth every 6 (six) hours as needed for Pain.       Controlled Substance Medication Failed - 9/3/2024 10:27 AM        Failed - This medication is a controlled substance - forward to provider to refill               Future Appointments         Provider Department Appt Notes    In 1 month Hebert Phillips MD [facility]           Recent Outpatient Visits              5 months ago Acute non-recurrent pansinusitis    [facility], Walk-In Clinic, [facility] Elif Bates APRN    Office Visit    6 months ago Chronic nonintractable headache, unspecified headache type    [facility]    [facility]    1 year ago [facility] mass    [facility]    Office Visit    1 year ago Depression, unspecified depression type    [facility]    Office Visit    1 year ago Other epilepsy without status epilepticus, not intractable (HCC)    [facility] Chase Watkins DO    Telemedicine

## 2024-09-03 NOTE — TELEPHONE ENCOUNTER
Patient called looking for status on Rx; He is out of Rx. I made him aware I will garrett as an Urgent request. I also offered to schedule his overdue physical --> scheduled. Patient verbalized understanding. No further questions or concerns at this time.    Future Appointments   Date Time Provider Department Center   10/7/2024  1:00 PM Hebert Phillips MD [facility]     Routing as URGENT

## 2024-09-03 NOTE — TELEPHONE ENCOUNTER
Message noted: Chart reviewed and may refill medication as requested. Script sent to listed pharmacy by secure method.    Pt notified through [employer]

## 2024-09-08 DIAGNOSIS — R51.9 CHRONIC NONINTRACTABLE HEADACHE, UNSPECIFIED HEADACHE TYPE: ICD-10-CM

## 2024-09-08 DIAGNOSIS — G89.29 CHRONIC NONINTRACTABLE HEADACHE, UNSPECIFIED HEADACHE TYPE: ICD-10-CM

## 2024-09-11 RX ORDER — HYDROCODONE BITARTRATE AND ACETAMINOPHEN 10; 325 MG/1; MG/1
1 TABLET ORAL EVERY 6 HOURS PRN
Qty: 30 TABLET | Refills: 0 | Status: SHIPPED | OUTPATIENT
Start: 2024-09-11 | End: 2024-09-16

## 2024-09-11 NOTE — TELEPHONE ENCOUNTER
Message noted: Chart reviewed and may refill medication as requested. Script sent to listed pharmacy by secure method.    Pt notified through [employer]

## 2024-09-11 NOTE — TELEPHONE ENCOUNTER
Please review; protocol failed    Future Appointments   Date Time Provider Department Center   10/7/2024  1:00 PM Hebert Phillips MD [facility]     LAST OFFICE VISIT: 2/26/2024 (telemedicine)    Recent fill dates: 9/3/24, 8/27/24, 8/21/24, and 8/14/24     For qty of: #30 each for a 7 day supply  Date of  last written prescription:      Date: 9/3/2024       Requested Prescriptions   Pending Prescriptions Disp Refills    HYDROcodone-acetaminophen  MG Oral Tab 30 tablet 0     Sig: Take 1 tablet by mouth every 6 (six) hours as needed for Pain.       Controlled Substance Medication Failed - 9/11/2024 11:22 AM        Failed - This medication is a controlled substance - forward to provider to refill             Future Appointments         Provider Department Appt Notes    In 3 weeks [facility]           Recent Outpatient Visits              5 months ago Acute non-recurrent pansinusitis    [facility]    Office Visit    6 months ago Chronic nonintractable headache, unspecified headache type    [facility]    [facility]    1 year ago Lip mass    [facility]    Office Visit    1 year ago Depression, unspecified depression type    [facility]    Office Visit    1 year ago Other epilepsy without status epilepticus, not intractable (HCC)    [facility]    Telemedicine

## 2024-09-11 NOTE — TELEPHONE ENCOUNTER
Patient called, verified Name and . He is requesting for refill of hydrocodone. Medication pended to run through protocol. Verified pharmacy.

## 2024-09-14 DIAGNOSIS — G89.29 CHRONIC NONINTRACTABLE HEADACHE, UNSPECIFIED HEADACHE TYPE: ICD-10-CM

## 2024-09-14 DIAGNOSIS — R51.9 CHRONIC NONINTRACTABLE HEADACHE, UNSPECIFIED HEADACHE TYPE: ICD-10-CM

## 2024-09-16 DIAGNOSIS — R51.9 CHRONIC NONINTRACTABLE HEADACHE, UNSPECIFIED HEADACHE TYPE: ICD-10-CM

## 2024-09-16 DIAGNOSIS — G89.29 CHRONIC NONINTRACTABLE HEADACHE, UNSPECIFIED HEADACHE TYPE: ICD-10-CM

## 2024-09-16 RX ORDER — HYDROCODONE BITARTRATE AND ACETAMINOPHEN 10; 325 MG/1; MG/1
1 TABLET ORAL EVERY 6 HOURS PRN
Qty: 30 TABLET | Refills: 0 | Status: SHIPPED | OUTPATIENT
Start: 2024-09-16 | End: 2024-09-24

## 2024-09-16 NOTE — TELEPHONE ENCOUNTER
Message noted: Chart reviewed and may refill medication as requested. Script sent to listed pharmacy by secure method.    Pt notified through [employer]

## 2024-09-19 RX ORDER — HYDROCODONE BITARTRATE AND ACETAMINOPHEN 10; 325 MG/1; MG/1
1 TABLET ORAL EVERY 6 HOURS PRN
Qty: 30 TABLET | Refills: 0 | OUTPATIENT
Start: 2024-09-19 | End: 2024-09-27

## 2024-09-20 DIAGNOSIS — G89.29 CHRONIC NONINTRACTABLE HEADACHE, UNSPECIFIED HEADACHE TYPE: ICD-10-CM

## 2024-09-20 DIAGNOSIS — R51.9 CHRONIC NONINTRACTABLE HEADACHE, UNSPECIFIED HEADACHE TYPE: ICD-10-CM

## 2024-09-24 RX ORDER — HYDROCODONE BITARTRATE AND ACETAMINOPHEN 10; 325 MG/1; MG/1
1 TABLET ORAL EVERY 6 HOURS PRN
Qty: 30 TABLET | Refills: 0 | Status: SHIPPED | OUTPATIENT
Start: 2024-09-24 | End: 2024-10-02

## 2024-09-24 NOTE — TELEPHONE ENCOUNTER
Spoke to patient, full name and date of birth verified.  Patient reports that the prescription of Norco sent on 9/16/24 is a 1 week supply.   Patient requests refill to [facility] in [place], is more convenient.    No protocol for controlled substance, routing to Dr. Phillips.   Patient would like a call back when sent.     Chart review:  Last office visit 2/26/24 telemedicine visit    Refills on Norco:    Dispensed Written Strength Quantity Refills Days Supply Provider Pharmacy   HYDROCODONE BITARTRATE-ACETAMINOPHE 09/11/2024 09/11/2024 325-10 mg 30  7 PAE, RO[facility] #[id number]   HYDROCODONE BITARTRATE-ACETAMINOPHE 09/03/2024 09/03/2024 325-10 mg 30  7 PAE, [facility]   HYDROCODONE BITARTRATE-ACETAMINOPHE 08/27/2024 08/27/2024 325-10 mg 30  7 PAE, ROSE HILTON   HYDROCODONE BITARTRATE-ACETAMINOPHE 08/21/2024 08/21/2024 325-10 mg 30  7 PAE, ROSE DOTSON [facility]   HYDROCODONE BITARTRATE-ACETAMINOPHE 08/14/2024 08/14/2024 325-10 mg 30  7 PAE, ROSE DAWN.     RN called [facility] in [place], spoke to [facility] pharmacy tech.   Confirmed patient did  the prescription sent on 9/16/24, it was picked up on 9/17/24.

## 2024-09-24 NOTE — TELEPHONE ENCOUNTER
Message noted: Chart reviewed and may refill medication as requested. Script sent to listed pharmacy by secure method.    Pt notified through [employer]

## 2024-09-28 DIAGNOSIS — G89.29 CHRONIC NONINTRACTABLE HEADACHE, UNSPECIFIED HEADACHE TYPE: ICD-10-CM

## 2024-09-28 DIAGNOSIS — R51.9 CHRONIC NONINTRACTABLE HEADACHE, UNSPECIFIED HEADACHE TYPE: ICD-10-CM

## 2024-10-01 DIAGNOSIS — G40.802 OTHER EPILEPSY WITHOUT STATUS EPILEPTICUS, NOT INTRACTABLE (HCC): ICD-10-CM

## 2024-10-01 RX ORDER — HYDROCODONE BITARTRATE AND ACETAMINOPHEN 10; 325 MG/1; MG/1
1 TABLET ORAL EVERY 6 HOURS PRN
Qty: 30 TABLET | Refills: 0 | Status: SHIPPED | OUTPATIENT
Start: 2024-10-01 | End: 2024-10-05

## 2024-10-01 RX ORDER — LEVETIRACETAM 750 MG/1
750 TABLET ORAL 2 TIMES DAILY
Qty: 180 TABLET | Refills: 3 | OUTPATIENT
Start: 2024-10-01

## 2024-10-01 NOTE — TELEPHONE ENCOUNTER
Routing as high priority.    Patient (name and  verified) calling because he is out of medication. He states that he gets this prescription weekly. He states has been taking Norco since

## 2024-10-01 NOTE — TELEPHONE ENCOUNTER
Message noted: Chart reviewed and may refill medication as requested. Script sent to listed pharmacy by secure method.    Pt notified through [employer]

## 2024-10-01 NOTE — TELEPHONE ENCOUNTER
Please review. Protocol Failed; No Protocol      Recent fills: 9/11/2024, 9/17/2024, 9/24/2024  Last Rx written: 9/24/2024  Last office visit: 7/31/2023    Future Appointments  Date Type Provider Dept   10/07/24 Appointment Hebert Phillips MD [facility]   Showing future appointments within next 150 days with a meds authorizing provider and meeting all other requirements        Requested Prescriptions   Pending Prescriptions Disp Refills    HYDROcodone-acetaminophen  MG Oral Tab 30 tablet 0     Sig: Take 1 tablet by mouth every 6 (six) hours as needed for Pain.       Controlled Substance Medication Failed - 10/1/2024  9:03 AM        Failed - This medication is a controlled substance - forward to provider to refill               Future Appointments         Provider Department Appt Notes    In 6 days Hebert Phillips MD [facility]           Recent Outpatient Visits              6 months ago Acute non-recurrent pansinusitis    [facility], [facility] Elif Bates APRN    Office Visit    7 months ago [facility]int[facility], [facility]    1 [facility] ago [facility]    Office Visit    1 year ago [facility], unspecified [facility] type    [facility]    Office Visit    1 year ago Other epilepsy without status epilepticus, not intractable (HCC)    [facility] Chase Watkins DO    Telemedicine

## 2024-10-05 DIAGNOSIS — R51.9 CHRONIC NONINTRACTABLE HEADACHE, UNSPECIFIED HEADACHE TYPE: ICD-10-CM

## 2024-10-05 DIAGNOSIS — G89.29 CHRONIC NONINTRACTABLE HEADACHE, UNSPECIFIED HEADACHE TYPE: ICD-10-CM

## 2024-10-07 RX ORDER — HYDROCODONE BITARTRATE AND ACETAMINOPHEN 10; 325 MG/1; MG/1
1 TABLET ORAL EVERY 6 HOURS PRN
Qty: 30 TABLET | Refills: 0 | Status: SHIPPED | OUTPATIENT
Start: 2024-10-07 | End: 2024-10-15

## 2024-10-07 NOTE — TELEPHONE ENCOUNTER
Message noted: Chart reviewed and may refill medication as requested. Script sent to listed pharmacy by secure method.    Pt notified through [employer]

## 2024-10-10 DIAGNOSIS — G40.802 OTHER EPILEPSY WITHOUT STATUS EPILEPTICUS, NOT INTRACTABLE (HCC): ICD-10-CM

## 2024-10-10 NOTE — TELEPHONE ENCOUNTER
Dr. Phillips, pended prescription, if appropriate    Spoke to patient, full name and date of birth verified.  Patient calling to request refill on his Keppra, would like a 30-day supply (has to self-pay right now).     Patient has no insurance and cannot afford to see Dr. Watkins until his new insurance starts.    Patient states he is completely out of Keppra and confirmed he takes this for past history of epilepsy/seizures.

## 2024-10-11 DIAGNOSIS — G89.29 CHRONIC NONINTRACTABLE HEADACHE, UNSPECIFIED HEADACHE TYPE: ICD-10-CM

## 2024-10-11 DIAGNOSIS — R51.9 CHRONIC NONINTRACTABLE HEADACHE, UNSPECIFIED HEADACHE TYPE: ICD-10-CM

## 2024-10-11 RX ORDER — LEVETIRACETAM 750 MG/1
750 TABLET ORAL 2 TIMES DAILY
Qty: 60 TABLET | Refills: 0 | Status: SHIPPED | OUTPATIENT
Start: 2024-10-11

## 2024-10-12 RX ORDER — HYDROCODONE BITARTRATE AND ACETAMINOPHEN 10; 325 MG/1; MG/1
1 TABLET ORAL EVERY 6 HOURS PRN
Qty: 30 TABLET | Refills: 0 | Status: SHIPPED | OUTPATIENT
Start: 2024-10-12 | End: 2024-10-20

## 2024-10-12 NOTE — TELEPHONE ENCOUNTER
Message noted: Chart reviewed and may refill medication as requested. Script sent to listed pharmacy by secure method.    Pt notified through [employer]    
no

## 2024-10-20 DIAGNOSIS — R51.9 CHRONIC NONINTRACTABLE HEADACHE, UNSPECIFIED HEADACHE TYPE: ICD-10-CM

## 2024-10-20 DIAGNOSIS — G89.29 CHRONIC NONINTRACTABLE HEADACHE, UNSPECIFIED HEADACHE TYPE: ICD-10-CM

## 2024-10-21 ENCOUNTER — TELEPHONE (OUTPATIENT)
Dept: FAMILY MEDICINE CLINIC | Facility: CLINIC | Age: 29
End: 2024-10-21

## 2024-10-21 RX ORDER — HYDROCODONE BITARTRATE AND ACETAMINOPHEN 10; 325 MG/1; MG/1
1 TABLET ORAL EVERY 6 HOURS PRN
Qty: 30 TABLET | Refills: 0 | Status: SHIPPED | OUTPATIENT
Start: 2024-10-21 | End: 2024-10-29

## 2024-10-21 NOTE — TELEPHONE ENCOUNTER
Please review. Protocol Failed; No Protocol      Recent fills: 10/1/2024, 10/7/2024, 10/14/2024  Last Rx written: 10/12/2024  Last office visit: 7/31/2023  Tele visit: 2/26/2024          Requested Prescriptions   Pending Prescriptions Disp Refills    HYDROcodone-acetaminophen  MG Oral Tab 30 tablet 0     Sig: Take 1 tablet by mouth every 6 (six) hours as needed for Pain.       Controlled Substance Medication Failed - 10/21/2024  4:19 PM        Failed - This medication is a controlled substance - forward to provider to refill                 Recent Outpatient Visits              7 months ago Acute non-recurrent pansinusitis    [facility]    Office Visit    7 months ago Chronic nonintractable headache, unspecified [facility] type    [facility]    [facility]    1 [facility]    Office Visit    1 year ago Depression, unspecified depression type    [facility]    [facility] Visit    1 year ago Other epilepsy without status epilepticus, not intractable (HCC)    [facility]    [facility]

## 2024-10-21 NOTE — TELEPHONE ENCOUNTER
Patient is out of [place] and requesting refill. Medication pended. Routing as high priority for protocol.

## 2024-10-21 NOTE — TELEPHONE ENCOUNTER
Message noted: Chart reviewed and may refill medication as requested. Script sent to listed pharmacy by secure method.    Pt notified through [employer]

## 2024-10-21 NOTE — TELEPHONE ENCOUNTER
On-call note: I received a page from Richard from esther Gross.  Stating there was a drug verification for patient.  I waited on hold for 25 minutes and finally hung up.  Finally I decided to call back and spoke to the pharmacist.  He states that the patient said 210 [place]'s since September 11, 2024 and they do not feel comfortable filling any more as there is no Narcan prescribed.  He asked that the patient go back and see Dr. Phillips as may need a pain specialist.  I told the pharmacist I would leave a message for Dr. Phillips.

## 2024-10-22 NOTE — TELEPHONE ENCOUNTER
Last office visit 7/31/23  Called Patient and informed him of Dr. Phillips's message.  Patient states he does not have insurance at this time and cannot afford to pay out of pocket for an office visit.  Encouraged to schedule an appointment before his next [place] refill is due as his last appointment was last year.  Patient was upset and ended the call.

## 2024-10-22 NOTE — TELEPHONE ENCOUNTER
Message noted. Will have staff contact pt to appointment for follow up to discuss norco use and review treatment/ narcan.

## 2024-10-22 NOTE — TELEPHONE ENCOUNTER
Patient was called and inform of  message below. Patient made a appointment for Monday video visit.    Future Appointments   Date Time Provider Department Center   10/28/2024  3:30 PM Hebert Phillips MD [facility] LANDY Johnson

## 2024-10-24 ENCOUNTER — HOSPITAL ENCOUNTER (EMERGENCY)
Age: 29
Discharge: HOME | End: 2024-10-24
Attending: EMERGENCY MEDICINE

## 2024-10-24 VITALS
SYSTOLIC BLOOD PRESSURE: 139 MMHG | HEIGHT: 74 IN | HEART RATE: 94 BPM | OXYGEN SATURATION: 98 % | BODY MASS INDEX: 26.09 KG/M2 | DIASTOLIC BLOOD PRESSURE: 90 MMHG | RESPIRATION RATE: 16 BRPM | TEMPERATURE: 97.9 F | WEIGHT: 203.26 LBS

## 2024-10-24 DIAGNOSIS — R56.9 SEIZURE  (CMD): Primary | ICD-10-CM

## 2024-10-24 PROCEDURE — 10002803 HB RX 637

## 2024-10-24 PROCEDURE — 96374 THER/PROPH/DIAG INJ IV PUSH: CPT

## 2024-10-24 PROCEDURE — 99284 EMERGENCY DEPT VISIT MOD MDM: CPT

## 2024-10-24 PROCEDURE — 99283 EMERGENCY DEPT VISIT LOW MDM: CPT

## 2024-10-24 PROCEDURE — 10002800 HB RX 250 W HCPCS

## 2024-10-24 RX ORDER — LEVETIRACETAM 750 MG/1
750 TABLET ORAL 2 TIMES DAILY
Qty: 60 TABLET | Refills: 1 | Status: SHIPPED | OUTPATIENT
Start: 2024-10-24

## 2024-10-24 RX ORDER — LEVETIRACETAM 500 MG/5ML
20 INJECTION, SOLUTION, CONCENTRATE INTRAVENOUS ONCE
Status: COMPLETED | OUTPATIENT
Start: 2024-10-24 | End: 2024-10-24

## 2024-10-24 RX ORDER — LEVETIRACETAM 750 MG/1
750 TABLET ORAL 2 TIMES DAILY
Qty: 60 TABLET | Refills: 1 | Status: SHIPPED | OUTPATIENT
Start: 2024-10-24 | End: 2024-10-24 | Stop reason: ALTCHOICE

## 2024-10-24 RX ORDER — HYDROCODONE BITARTRATE AND ACETAMINOPHEN 7.5; 325 MG/1; MG/1
1 TABLET ORAL ONCE
Status: COMPLETED | OUTPATIENT
Start: 2024-10-24 | End: 2024-10-24

## 2024-10-24 RX ADMIN — LEVETIRACETAM 1840 MG: 100 INJECTION, SOLUTION INTRAVENOUS at 21:00

## 2024-10-24 RX ADMIN — HYDROCODONE BITARTRATE AND ACETAMINOPHEN 1 TABLET: 7.5; 325 TABLET ORAL at 21:00

## 2024-10-24 ASSESSMENT — PAIN SCALES - GENERAL: PAINLEVEL_OUTOF10: 7

## 2024-10-25 LAB
RAINBOW EXTRA TUBES HOLD SPECIMEN: NORMAL

## 2024-10-26 DIAGNOSIS — R51.9 CHRONIC NONINTRACTABLE HEADACHE, UNSPECIFIED HEADACHE TYPE: ICD-10-CM

## 2024-10-26 DIAGNOSIS — G89.29 CHRONIC NONINTRACTABLE HEADACHE, UNSPECIFIED HEADACHE TYPE: ICD-10-CM

## 2024-10-26 RX ORDER — HYDROCODONE BITARTRATE AND ACETAMINOPHEN 10; 325 MG/1; MG/1
1 TABLET ORAL EVERY 6 HOURS PRN
Qty: 30 TABLET | Refills: 0 | Status: SHIPPED | OUTPATIENT
Start: 2024-10-26 | End: 2024-11-03

## 2024-10-26 NOTE — TELEPHONE ENCOUNTER
Message noted: Chart reviewed and may refill medication as requested. Prescription sent to listed pharmacy. Pharmacy to notify patient. Pt notified through [employer]  Script for naloxone sent as well due to chronic opiate use.

## 2024-10-28 ENCOUNTER — TELEMEDICINE (OUTPATIENT)
Dept: FAMILY MEDICINE CLINIC | Facility: CLINIC | Age: 29
End: 2024-10-28

## 2024-10-28 DIAGNOSIS — R51.9 CHRONIC NONINTRACTABLE HEADACHE, UNSPECIFIED HEADACHE TYPE: Primary | ICD-10-CM

## 2024-10-28 DIAGNOSIS — G89.29 CHRONIC NONINTRACTABLE HEADACHE, UNSPECIFIED HEADACHE TYPE: Primary | ICD-10-CM

## 2024-10-28 PROCEDURE — 99441 PHONE E/M BY PHYS 5-10 MIN: CPT | Performed by: FAMILY MEDICINE

## 2024-10-28 NOTE — PROGRESS NOTES
Subjective:   Patient ID: Lb Aguilar is a 29 year old male.    Virtual Telephone Check-In    Lb Aguilar verbally consents to a Virtual/Telephone Check-In visit on 10/28/24.  Patient has been referred to the [facility] website at [email or web address] to review the yearly Consent to Treat document.    Patient understands and accepts financial responsibility for any deductible, co-insurance and/or co-pays associated with this service.    Duration of the service: 5 minutes      Summary of topics discussed: follow up on medication    Patient is here for follow up for chronic pain/ headaches . The patient is compliant with medications and no side effects. Pt states he has been taking as prescribed. Denies any other drug use of obtaining from other providers.   Discussed reduction of medication if possible. Also explained that script for narcan available to overdose at pharmacy.     Has hx of seizures and seeing neurology.     Hebert Phillips MD                  History/Other:   Review of Systems  Current Outpatient Medications   Medication Sig Dispense Refill    HYDROcodone-acetaminophen (NORCO) 7.5-325 MG Oral Tab Take 1 tablet by mouth every 6 (six) hours as needed for Pain. Medication may causes sedation, constipation. Not to operate heavy machinery or drive on medication. 30 tablet 0    HYDROcodone-acetaminophen  MG Oral Tab Take 1 tablet by mouth every 6 (six) hours as needed for Pain. 30 tablet 0    levetiracetam 750 MG Oral Tab Take 1 tablet (750 mg total) by mouth 2 (two) times daily. 60 tablet 0    ibuprofen 600 MG Oral Tab Take 1 tablet (600 mg total) by mouth every 6 (six) hours as needed for Pain. 30 tablet 0    amphetamine-dextroamphetamine 30 MG Oral Tab Take 1 tablet (30 mg total) by mouth daily. 30 tablet 0     Allergies:Allergies[1]    Objective:   Physical Exam  Neurological:      Mental Status: He is alert.   Psychiatric:         Mood and Affect: Mood normal.         Behavior: Behavior normal.          Thought Content: Thought content normal.         Judgment: Judgment normal.         Assessment & Plan:   1. Chronic nonintractable headache, unspecified headache type   - Stable: medication reviewed. Discussed pain contract and follow up when able for check up and testing. To continue present treatment; To call if problems; Routine follow up. Patient verbalized understanding of recommendations and agrees to plan. Encouraged weaning down on pain medication.             No orders of the defined types were placed in this encounter.      Meds This Visit:  Requested Prescriptions      No prescriptions requested or ordered in this encounter       Imaging & Referrals:  None         [1]   Allergies  Allergen Reactions    Benadryl [Diphenhydramine] NAUSEA AND VOMITING    Tramadol ANXIETY and ITCHING

## 2024-11-01 DIAGNOSIS — G89.29 CHRONIC NONINTRACTABLE HEADACHE, UNSPECIFIED HEADACHE TYPE: ICD-10-CM

## 2024-11-01 DIAGNOSIS — R51.9 CHRONIC NONINTRACTABLE HEADACHE, UNSPECIFIED HEADACHE TYPE: ICD-10-CM

## 2024-11-02 RX ORDER — HYDROCODONE BITARTRATE AND ACETAMINOPHEN 10; 325 MG/1; MG/1
1 TABLET ORAL EVERY 6 HOURS PRN
Qty: 30 TABLET | Refills: 0 | Status: SHIPPED | OUTPATIENT
Start: 2024-11-02 | End: 2024-11-10

## 2024-11-02 NOTE — TELEPHONE ENCOUNTER
Message noted: Chart reviewed and may refill medication as requested. Script sent to listed pharmacy by secure method.    Pt notified through [employer]

## 2024-11-06 DIAGNOSIS — G89.29 CHRONIC NONINTRACTABLE HEADACHE, UNSPECIFIED HEADACHE TYPE: ICD-10-CM

## 2024-11-06 DIAGNOSIS — R51.9 CHRONIC NONINTRACTABLE HEADACHE, UNSPECIFIED HEADACHE TYPE: ICD-10-CM

## 2024-11-06 RX ORDER — HYDROCODONE BITARTRATE AND ACETAMINOPHEN 10; 325 MG/1; MG/1
1 TABLET ORAL EVERY 6 HOURS PRN
Qty: 30 TABLET | Refills: 0 | Status: SHIPPED | OUTPATIENT
Start: 2024-11-06 | End: 2024-11-14

## 2024-11-06 NOTE — TELEPHONE ENCOUNTER
Message noted: Chart reviewed and may refill medication as requested. Script sent to listed pharmacy by secure method.    Pt notified through [employer]

## 2024-11-11 DIAGNOSIS — R51.9 CHRONIC NONINTRACTABLE HEADACHE, UNSPECIFIED HEADACHE TYPE: ICD-10-CM

## 2024-11-11 DIAGNOSIS — G89.29 CHRONIC NONINTRACTABLE HEADACHE, UNSPECIFIED HEADACHE TYPE: ICD-10-CM

## 2024-11-12 ENCOUNTER — HOSPITAL ENCOUNTER (EMERGENCY)
Age: 29
Discharge: HOME | End: 2024-11-12
Attending: EMERGENCY MEDICINE

## 2024-11-12 ENCOUNTER — APPOINTMENT (OUTPATIENT)
Dept: GENERAL RADIOLOGY | Age: 29
End: 2024-11-12
Attending: NURSE PRACTITIONER

## 2024-11-12 VITALS
SYSTOLIC BLOOD PRESSURE: 122 MMHG | DIASTOLIC BLOOD PRESSURE: 88 MMHG | HEART RATE: 88 BPM | RESPIRATION RATE: 16 BRPM | OXYGEN SATURATION: 97 % | TEMPERATURE: 98.1 F

## 2024-11-12 DIAGNOSIS — S61.211A LACERATION OF LEFT INDEX FINGER WITHOUT FOREIGN BODY WITHOUT DAMAGE TO NAIL, INITIAL ENCOUNTER: Primary | ICD-10-CM

## 2024-11-12 PROCEDURE — 99283 EMERGENCY DEPT VISIT LOW MDM: CPT

## 2024-11-12 PROCEDURE — 90715 TDAP VACCINE 7 YRS/> IM: CPT | Performed by: EMERGENCY MEDICINE

## 2024-11-12 PROCEDURE — 96372 THER/PROPH/DIAG INJ SC/IM: CPT | Performed by: EMERGENCY MEDICINE

## 2024-11-12 PROCEDURE — 99283 EMERGENCY DEPT VISIT LOW MDM: CPT | Performed by: EMERGENCY MEDICINE

## 2024-11-12 PROCEDURE — 10002800 HB RX 250 W HCPCS: Performed by: EMERGENCY MEDICINE

## 2024-11-12 PROCEDURE — 12001 RPR S/N/AX/GEN/TRNK 2.5CM/<: CPT

## 2024-11-12 PROCEDURE — 90471 IMMUNIZATION ADMIN: CPT | Performed by: EMERGENCY MEDICINE

## 2024-11-12 PROCEDURE — 73130 X-RAY EXAM OF HAND: CPT

## 2024-11-12 RX ORDER — LIDOCAINE HYDROCHLORIDE 10 MG/ML
10 INJECTION, SOLUTION EPIDURAL; INFILTRATION; INTRACAUDAL; PERINEURAL ONCE
Status: DISCONTINUED | OUTPATIENT
Start: 2024-11-12 | End: 2024-11-12

## 2024-11-12 RX ORDER — CEPHALEXIN 500 MG/1
500 CAPSULE ORAL 3 TIMES DAILY
Qty: 21 CAPSULE | Refills: 0 | Status: SHIPPED | OUTPATIENT
Start: 2024-11-12 | End: 2024-11-19

## 2024-11-12 RX ORDER — HYDROCODONE BITARTRATE AND ACETAMINOPHEN 10; 325 MG/1; MG/1
1 TABLET ORAL EVERY 6 HOURS PRN
Qty: 30 TABLET | Refills: 0 | Status: SHIPPED | OUTPATIENT
Start: 2024-11-12 | End: 2024-11-16

## 2024-11-12 RX ORDER — CEFAZOLIN SODIUM 1 G/3ML
2000 INJECTION, POWDER, FOR SOLUTION INTRAMUSCULAR; INTRAVENOUS ONCE
Status: COMPLETED | OUTPATIENT
Start: 2024-11-12 | End: 2024-11-12

## 2024-11-12 RX ORDER — LIDOCAINE HYDROCHLORIDE 10 MG/ML
5 INJECTION, SOLUTION EPIDURAL; INFILTRATION; INTRACAUDAL; PERINEURAL ONCE
Status: DISCONTINUED | OUTPATIENT
Start: 2024-11-12 | End: 2024-11-12 | Stop reason: HOSPADM

## 2024-11-12 RX ADMIN — CEFAZOLIN 2000 MG: 330 INJECTION, POWDER, FOR SOLUTION INTRAMUSCULAR; INTRAVENOUS at 16:21

## 2024-11-12 RX ADMIN — TETANUS TOXOID, REDUCED DIPHTHERIA TOXOID AND ACELLULAR PERTUSSIS VACCINE, ADSORBED 0.5 ML: 5; 2.5; 8; 8; 2.5 SUSPENSION INTRAMUSCULAR at 16:17

## 2024-11-12 NOTE — TELEPHONE ENCOUNTER
Message noted: Chart reviewed and may refill medication as requested. Script sent to listed pharmacy by secure method.    Pt notified through [employer]

## 2024-11-16 DIAGNOSIS — G89.29 CHRONIC NONINTRACTABLE HEADACHE, UNSPECIFIED HEADACHE TYPE: ICD-10-CM

## 2024-11-16 DIAGNOSIS — R51.9 CHRONIC NONINTRACTABLE HEADACHE, UNSPECIFIED HEADACHE TYPE: ICD-10-CM

## 2024-11-16 RX ORDER — HYDROCODONE BITARTRATE AND ACETAMINOPHEN 10; 325 MG/1; MG/1
1 TABLET ORAL EVERY 6 HOURS PRN
Qty: 30 TABLET | Refills: 0 | Status: SHIPPED | OUTPATIENT
Start: 2024-11-16 | End: 2024-11-24

## 2024-11-16 NOTE — TELEPHONE ENCOUNTER
Message noted: Chart reviewed and may refill medication as requested. Script sent to listed pharmacy by secure method.    Pt notified through [employer]

## 2024-11-20 DIAGNOSIS — R51.9 CHRONIC NONINTRACTABLE HEADACHE, UNSPECIFIED HEADACHE TYPE: ICD-10-CM

## 2024-11-20 DIAGNOSIS — G89.29 CHRONIC NONINTRACTABLE HEADACHE, UNSPECIFIED HEADACHE TYPE: ICD-10-CM

## 2024-11-20 RX ORDER — HYDROCODONE BITARTRATE AND ACETAMINOPHEN 10; 325 MG/1; MG/1
1 TABLET ORAL EVERY 6 HOURS PRN
Qty: 30 TABLET | Refills: 0 | Status: SHIPPED | OUTPATIENT
Start: 2024-11-20 | End: 2024-11-28

## 2024-11-20 NOTE — TELEPHONE ENCOUNTER
Message noted: Chart reviewed and may refill medication as requested. Script sent to listed pharmacy by secure method.    Pt notified through [employer]

## 2024-11-26 DIAGNOSIS — G89.29 CHRONIC NONINTRACTABLE HEADACHE, UNSPECIFIED HEADACHE TYPE: ICD-10-CM

## 2024-11-26 DIAGNOSIS — R51.9 CHRONIC NONINTRACTABLE HEADACHE, UNSPECIFIED HEADACHE TYPE: ICD-10-CM

## 2024-11-27 RX ORDER — HYDROCODONE BITARTRATE AND ACETAMINOPHEN 10; 325 MG/1; MG/1
1 TABLET ORAL EVERY 6 HOURS PRN
Qty: 30 TABLET | Refills: 0 | Status: SHIPPED | OUTPATIENT
Start: 2024-11-27 | End: 2024-12-02

## 2024-11-27 NOTE — TELEPHONE ENCOUNTER
Message noted: Chart reviewed and may refill medication as requested. Script sent to listed pharmacy by secure method.    Pt notified through [employer]

## 2024-12-02 DIAGNOSIS — G89.29 CHRONIC NONINTRACTABLE HEADACHE, UNSPECIFIED HEADACHE TYPE: ICD-10-CM

## 2024-12-02 DIAGNOSIS — R51.9 CHRONIC NONINTRACTABLE HEADACHE, UNSPECIFIED HEADACHE TYPE: ICD-10-CM

## 2024-12-02 RX ORDER — HYDROCODONE BITARTRATE AND ACETAMINOPHEN 10; 325 MG/1; MG/1
1 TABLET ORAL EVERY 6 HOURS PRN
Qty: 30 TABLET | Refills: 0 | Status: SHIPPED | OUTPATIENT
Start: 2024-12-02 | End: 2024-12-10

## 2024-12-02 NOTE — TELEPHONE ENCOUNTER
Please review. Protocol Failed; No Protocol      Recent fills: 11/16/2024, 11/22/2024, 11/27/2024  Last Rx written: 11/27/2024  Last office visit: 7/31/2023  Tele visit: 10/28/2024          Requested Prescriptions   Pending Prescriptions Disp Refills    HYDROcodone-acetaminophen  MG Oral Tab 30 tablet 0     Sig: Take 1 tablet by mouth every 6 (six) hours as needed for Pain.       Controlled Substance Medication Failed - 12/2/2024  4:14 PM        Failed - This medication is a controlled substance - forward to provider to refill                 Recent Outpatient Visits              1 month ago [facility] nonintractable [facility], unspecified headache type    [facility]    [facility]    8 months ago Acute non-recurrent pansinusitis    [facility], [facility], Elif Kingsley APRN    Office Visit    9 months ago Chronic nonintractable headache, unspecified headache type    [facility]    1 year ago [facility]    [facility]    1 year ago Depression, unspecified depression type    [facility]    [facility] Visit

## 2024-12-02 NOTE — TELEPHONE ENCOUNTER
Message noted: Chart reviewed and may refill medication as requested. Script sent to listed pharmacy by secure method.    Pt notified through [employer]

## 2024-12-02 NOTE — TELEPHONE ENCOUNTER
Patient calling to check status of Norco refill request,verified name and date of birth.   He is almost out of medication.  Medication  pended to run through protocol.  Pharmacy verified.

## 2024-12-06 DIAGNOSIS — R51.9 CHRONIC NONINTRACTABLE HEADACHE, UNSPECIFIED HEADACHE TYPE: ICD-10-CM

## 2024-12-06 DIAGNOSIS — G89.29 CHRONIC NONINTRACTABLE HEADACHE, UNSPECIFIED HEADACHE TYPE: ICD-10-CM

## 2024-12-07 RX ORDER — HYDROCODONE BITARTRATE AND ACETAMINOPHEN 10; 325 MG/1; MG/1
1 TABLET ORAL EVERY 6 HOURS PRN
Qty: 30 TABLET | Refills: 0 | Status: SHIPPED | OUTPATIENT
Start: 2024-12-07 | End: 2024-12-15

## 2024-12-07 NOTE — TELEPHONE ENCOUNTER
Message noted: Chart reviewed and may refill medication as requested. Script sent to listed pharmacy by secure method.    Pt notified through [employer]

## 2024-12-11 DIAGNOSIS — R51.9 CHRONIC NONINTRACTABLE HEADACHE, UNSPECIFIED HEADACHE TYPE: ICD-10-CM

## 2024-12-11 DIAGNOSIS — G89.29 CHRONIC NONINTRACTABLE HEADACHE, UNSPECIFIED HEADACHE TYPE: ICD-10-CM

## 2024-12-12 RX ORDER — HYDROCODONE BITARTRATE AND ACETAMINOPHEN 10; 325 MG/1; MG/1
1 TABLET ORAL EVERY 6 HOURS PRN
Qty: 30 TABLET | Refills: 0 | Status: SHIPPED | OUTPATIENT
Start: 2024-12-12 | End: 2024-12-20

## 2024-12-12 NOTE — TELEPHONE ENCOUNTER
Message noted: Chart reviewed and may refill medication as requested. Script sent to listed pharmacy by secure method.    Pt notified through [employer]

## 2024-12-16 DIAGNOSIS — G89.29 CHRONIC NONINTRACTABLE HEADACHE, UNSPECIFIED HEADACHE TYPE: ICD-10-CM

## 2024-12-16 DIAGNOSIS — R51.9 CHRONIC NONINTRACTABLE HEADACHE, UNSPECIFIED HEADACHE TYPE: ICD-10-CM

## 2024-12-18 RX ORDER — HYDROCODONE BITARTRATE AND ACETAMINOPHEN 10; 325 MG/1; MG/1
1 TABLET ORAL EVERY 6 HOURS PRN
Qty: 30 TABLET | Refills: 0 | Status: SHIPPED | OUTPATIENT
Start: 2024-12-18 | End: 2024-12-23

## 2024-12-19 NOTE — TELEPHONE ENCOUNTER
Message noted: Chart reviewed and may refill medication as requested. Script sent to listed pharmacy by secure method.    Pt notified through [employer]     Patient/Family

## 2024-12-23 DIAGNOSIS — R51.9 CHRONIC NONINTRACTABLE HEADACHE, UNSPECIFIED HEADACHE TYPE: ICD-10-CM

## 2024-12-23 DIAGNOSIS — G89.29 CHRONIC NONINTRACTABLE HEADACHE, UNSPECIFIED HEADACHE TYPE: ICD-10-CM

## 2024-12-23 RX ORDER — HYDROCODONE BITARTRATE AND ACETAMINOPHEN 10; 325 MG/1; MG/1
1 TABLET ORAL EVERY 6 HOURS PRN
Qty: 30 TABLET | Refills: 0 | Status: SHIPPED | OUTPATIENT
Start: 2024-12-23 | End: 2024-12-31

## 2024-12-23 NOTE — TELEPHONE ENCOUNTER
Message noted: Chart reviewed and may refill medication as requested. Script sent to listed pharmacy by secure method.    Pt notified through [employer]

## 2024-12-28 DIAGNOSIS — G89.29 CHRONIC NONINTRACTABLE HEADACHE, UNSPECIFIED HEADACHE TYPE: ICD-10-CM

## 2024-12-28 DIAGNOSIS — R51.9 CHRONIC NONINTRACTABLE HEADACHE, UNSPECIFIED HEADACHE TYPE: ICD-10-CM

## 2024-12-28 RX ORDER — HYDROCODONE BITARTRATE AND ACETAMINOPHEN 10; 325 MG/1; MG/1
1 TABLET ORAL EVERY 6 HOURS PRN
Qty: 30 TABLET | Refills: 0 | Status: SHIPPED | OUTPATIENT
Start: 2024-12-28 | End: 2025-01-05

## 2024-12-28 NOTE — TELEPHONE ENCOUNTER
Message noted: Chart reviewed and may refill medication as requested. Script sent to listed pharmacy by secure method.    Pt notified through [employer]

## 2025-01-03 DIAGNOSIS — G89.29 CHRONIC NONINTRACTABLE HEADACHE, UNSPECIFIED HEADACHE TYPE: ICD-10-CM

## 2025-01-03 DIAGNOSIS — R51.9 CHRONIC NONINTRACTABLE HEADACHE, UNSPECIFIED HEADACHE TYPE: ICD-10-CM

## 2025-01-03 RX ORDER — HYDROCODONE BITARTRATE AND ACETAMINOPHEN 10; 325 MG/1; MG/1
1 TABLET ORAL EVERY 6 HOURS PRN
Qty: 30 TABLET | Refills: 0 | Status: SHIPPED | OUTPATIENT
Start: 2025-01-03 | End: 2025-01-03

## 2025-01-03 NOTE — TELEPHONE ENCOUNTER
Patient requesting expedited refill for norco.  States he takes it every 6 hours and it lasts him 1 week. Routed high priority to refill team.

## 2025-01-03 NOTE — TELEPHONE ENCOUNTER
MICA   I spoke with Dr. Lopez Pharmacist at [facility].  Pt has been getting 6 tabs per day, despite written as Q6H.  Per pharmacist records, there are other providers in [place] from whom he is getting Norco fills from.  Pt will likely be flagged by GABY.  To review meds with pt, reconsider future fills due to concern for abuse.

## 2025-01-03 NOTE — TELEPHONE ENCOUNTER
Patient calling for status update, advised we are waiting for a response.  Patient says he has chipped a tooth and it's hurting him  Advised ER if needed although we need to wait for response on this from provider.

## 2025-01-03 NOTE — TELEPHONE ENCOUNTER
Received call Renato RoblesColumbia pharmacist at [facility].     Patient has received 180 tabs of Norco since 12/2/24. Patient is being over prescribed medication as instructions state he can take every 6 hours or up to 4 tabs per day and he has received 6 tabs per day. Renato states he needs to speak with provider asap as likely patient will be flagged by the GABY.     Dr. Phillips not in the office, per Amin prescription today was approved by Dr. Fuentes. Requesting to speak with first available provider.  Including both providers in message and sending as high priority. Pharmacy call back [phone]

## 2025-01-03 NOTE — TELEPHONE ENCOUNTER
Please review. Protocol Failed; No Protocol      Routing to podmates due to High Priority status and Dr. Phillips is out of office.        Recent fills: 12/18/2024, 12/23/2024, 12/28/2024  Last Rx written: 12/28/2024  Last office visit: 7/31/2023  Tele-visit: 10/28/2024        Requested Prescriptions   Pending Prescriptions Disp Refills    HYDROcodone-acetaminophen  MG Oral Tab 30 tablet 0     Sig: Take 1 tablet by mouth every 6 (six) hours as needed for Pain.       Controlled Substance Medication Failed - 1/3/2025  9:58 AM        Failed - This medication is a controlled substance - forward to provider to refill                 Recent Outpatient Visits              2 months ago Chronic nonintractable headache, unspecified headache type    [facility]    [facility]    9 months ago Acute non-recurrent pansinusitis    [facility], [facility], Elif Kingsley APRN    Office Visit    10 months ago Chronic nonintractable headache, unspecified headache type    [facility]    [facility]    1 year ago [facility] mass    [facility]    [facility]    1 year ago Depression, unspecified depression type    [facility]    [facility] Visit

## 2025-01-08 DIAGNOSIS — G89.29 OTHER CHRONIC PAIN: Primary | ICD-10-CM

## 2025-01-08 DIAGNOSIS — R51.9 CHRONIC NONINTRACTABLE HEADACHE, UNSPECIFIED HEADACHE TYPE: ICD-10-CM

## 2025-01-08 DIAGNOSIS — G89.29 CHRONIC NONINTRACTABLE HEADACHE, UNSPECIFIED HEADACHE TYPE: ICD-10-CM

## 2025-01-08 RX ORDER — HYDROCODONE BITARTRATE AND ACETAMINOPHEN 10; 325 MG/1; MG/1
1 TABLET ORAL EVERY 6 HOURS PRN
Qty: 30 TABLET | Refills: 0 | Status: SHIPPED | OUTPATIENT
Start: 2025-01-08 | End: 2025-01-09

## 2025-01-08 NOTE — TELEPHONE ENCOUNTER
Dr. Phillips, please advise comments below from Dr. Fuentes - patient suspected drug abuse.     Please advise patient is requesting a refill and stating he is almost out of medication.    **[place] prescription was refused 1/3/2024  **Patient last in-person office visit was 7/31/2023, last appointment was telemedicine 10/28/24.    The original prescription was discontinued on 1/3/2025 by Montse Fuentes MD. Renewing this prescription may not be appropriate.     Per \"refill encounter\" 1/3/25:  Montse Fuentes MD FYI   I spoke with Dr. Lopez Pharmacist at [facility].  Pt has been getting 6 tabs per day, despite written as Q6H.  Per pharmacist records, there are other providers in [place] from whom he is getting Norco fills from.  Pt will likely be flagged by GABY.  To review meds with pt, reconsider future fills due to concern for abuse.        Hebert Phillips MD   Message noted.         Requested Prescriptions     Pending Prescriptions Disp Refills    HYDROcodone-acetaminophen  MG Oral Tab 30 tablet 0     Sig: Take 1 tablet by mouth every 6 (six) hours as needed for Pain.

## 2025-01-08 NOTE — TELEPHONE ENCOUNTER
Message noted: Chart reviewed and may refill medication as requested. Script sent to listed pharmacy by secure method.    Pt notified through [employer] data reviewed and only getting norco from this office and also patient contacted and states not getting from anyone else.

## 2025-01-08 NOTE — TELEPHONE ENCOUNTER
Patient requesting refill for Norco. He is almost out of medication. Routing as high priority.     Future Appointments   Date Time Provider Department Center   1/13/2025  3:30 PM Hebert Phillips MD [facility] LANDY Johnson

## 2025-01-09 ENCOUNTER — TELEPHONE (OUTPATIENT)
Dept: FAMILY MEDICINE CLINIC | Facility: CLINIC | Age: 30
End: 2025-01-09

## 2025-01-09 RX ORDER — HYDROCODONE BITARTRATE AND ACETAMINOPHEN 10; 325 MG/1; MG/1
1 TABLET ORAL EVERY 6 HOURS PRN
Qty: 30 TABLET | Refills: 0 | Status: SHIPPED | OUTPATIENT
Start: 2025-01-09 | End: 2025-01-11

## 2025-01-09 NOTE — TELEPHONE ENCOUNTER
Patient calling to check status of refill, advised sent this morning and [facility] should have the refill  Patient to check with them, if problem call     No other questions at this time.

## 2025-01-09 NOTE — TELEPHONE ENCOUNTER
Patient called, verified name/.  States [facility] will only give him 5 days of medication.  Then he took a call coming in on his end.  Got back on phone and stated Dr Phillips just called and spoke with him about the script.

## 2025-01-09 NOTE — TELEPHONE ENCOUNTER
Pharmacy needs more information about the diagnosis in order to do the refill for the Hydrocodone. Pharmacist stated she needs to know what the chronic pain is due to.

## 2025-01-09 NOTE — TELEPHONE ENCOUNTER
Pt contacted and takes for chronic jaw pain/ hand pain from laceration of his hand. In addition to history of migraines.

## 2025-01-09 NOTE — TELEPHONE ENCOUNTER
Called the pharmacy to inform them of below message from Dr. Phillips, pharmacy had already been contacted.

## 2025-01-09 NOTE — TELEPHONE ENCOUNTER
Pharmacist Sandra stated Rx -[place] have to have Reason for pain   Stated Rx has R519- headache , what is dx or underlying condition     May resend rx or will take a verbal   Please advise

## 2025-01-11 DIAGNOSIS — G89.29 CHRONIC NONINTRACTABLE HEADACHE, UNSPECIFIED HEADACHE TYPE: ICD-10-CM

## 2025-01-11 DIAGNOSIS — R51.9 CHRONIC NONINTRACTABLE HEADACHE, UNSPECIFIED HEADACHE TYPE: ICD-10-CM

## 2025-01-11 DIAGNOSIS — G89.29 OTHER CHRONIC PAIN: ICD-10-CM

## 2025-01-13 RX ORDER — HYDROCODONE BITARTRATE AND ACETAMINOPHEN 10; 325 MG/1; MG/1
1 TABLET ORAL EVERY 6 HOURS PRN
Qty: 30 TABLET | Refills: 0 | Status: SHIPPED | OUTPATIENT
Start: 2025-01-13 | End: 2025-01-21

## 2025-01-13 NOTE — TELEPHONE ENCOUNTER
Message noted: Chart reviewed and may refill medication as requested. Script sent to listed pharmacy by secure method.    Pt notified through [employer]

## 2025-01-17 DIAGNOSIS — G89.29 CHRONIC NONINTRACTABLE HEADACHE, UNSPECIFIED HEADACHE TYPE: ICD-10-CM

## 2025-01-17 DIAGNOSIS — G89.29 OTHER CHRONIC PAIN: ICD-10-CM

## 2025-01-17 DIAGNOSIS — R51.9 CHRONIC NONINTRACTABLE HEADACHE, UNSPECIFIED HEADACHE TYPE: ICD-10-CM

## 2025-01-18 RX ORDER — HYDROCODONE BITARTRATE AND ACETAMINOPHEN 10; 325 MG/1; MG/1
1 TABLET ORAL EVERY 6 HOURS PRN
Qty: 30 TABLET | Refills: 0 | Status: SHIPPED | OUTPATIENT
Start: 2025-01-18 | End: 2025-01-26

## 2025-01-18 NOTE — TELEPHONE ENCOUNTER
Message noted: Chart reviewed and may refill medication as requested. Script sent to listed pharmacy by secure method.    Pt notified through [email or web address]

## 2025-01-21 ENCOUNTER — NURSE TRIAGE (OUTPATIENT)
Dept: FAMILY MEDICINE CLINIC | Facility: CLINIC | Age: 30
End: 2025-01-21

## 2025-01-21 NOTE — TELEPHONE ENCOUNTER
Please reply to pool: EM RN TRIAGE  Action Requested: Summary for Provider     []  Critical Lab, Recommendations Needed  [] Need Additional Advice  [x]   FYI    []   Need Orders  [] Need Medications Sent to Pharmacy  []  Other     SUMMARY: Patient contacts clinic reporting he injured his hand/finger in October.  Knuckle was cut and tendon was exposed.  He was seen in emergency room and now requests follow up for referral to ortho. Ongoing pain.   He has an appointment today but states it is too cold to come in.  Rescheduled for tomorrow.     Reason for call: Hand Pain  Onset: Data Unavailable                       Reason for Disposition   Injury and pain has not improved after 3 days    Protocols used: Finger Injury-A-OH

## 2025-01-22 ENCOUNTER — OFFICE VISIT (OUTPATIENT)
Dept: FAMILY MEDICINE CLINIC | Facility: CLINIC | Age: 30
End: 2025-01-22

## 2025-01-22 VITALS
BODY MASS INDEX: 27.17 KG/M2 | TEMPERATURE: 99 F | HEIGHT: 73 IN | DIASTOLIC BLOOD PRESSURE: 78 MMHG | RESPIRATION RATE: 20 BRPM | WEIGHT: 205 LBS | SYSTOLIC BLOOD PRESSURE: 128 MMHG | HEART RATE: 92 BPM

## 2025-01-22 DIAGNOSIS — S61.412D LACERATION OF LEFT HAND WITHOUT FOREIGN BODY, SUBSEQUENT ENCOUNTER: Primary | ICD-10-CM

## 2025-01-22 PROCEDURE — 99213 OFFICE O/P EST LOW 20 MIN: CPT | Performed by: FAMILY MEDICINE

## 2025-01-22 RX ORDER — FLUOXETINE HYDROCHLORIDE 60 MG/1
1 TABLET, FILM COATED ORAL; ORAL DAILY
COMMUNITY
Start: 2024-08-09

## 2025-01-22 NOTE — PROGRESS NOTES
Subjective:   Patient ID: Lb Aguilar is a 29 year old male.    Pt presents with hx of left hand laceration in November. Pt has this repaired in ER.  Was suppose to see a specialist but did not follow up due to insurance issues. Pt has had some residual pain and swelling of area. Pt requesting referral for hand specialist. Some problems with making fist.         History/Other:   Review of Systems  Current Outpatient Medications   Medication Sig Dispense Refill    FLUoxetine HCl 60 MG Oral Tab Take 1 tablet by mouth daily.      HYDROcodone-acetaminophen  MG Oral Tab Take 1 tablet by mouth every 6 (six) hours as needed for Pain. 30 tablet 0    levetiracetam 750 MG Oral Tab Take 1 tablet (750 mg total) by mouth 2 (two) times daily. 60 tablet 0    ibuprofen 600 MG Oral Tab Take 1 tablet (600 mg total) by mouth every 6 (six) hours as needed for Pain. 30 tablet 0    amphetamine-dextroamphetamine 30 MG Oral Tab Take 1 tablet (30 mg total) by mouth daily. 30 tablet 0     Allergies:Allergies[1]    Objective:   Physical Exam  Constitutional:       Appearance: Normal appearance.   Musculoskeletal:         General: No swelling.      Comments: Left hand: scar of the posterior aspect of 2nd finger at base. ROM intact. Neurovascular intact/ normal.      Neurological:      Mental Status: He is alert.         Assessment & Plan:   1. Laceration of left hand without foreign body, subsequent encounter:  - After discussion, will send to hand specialists for further evaluation and treatment; To call if any significant symptoms.          No orders of the defined types were placed in this encounter.      Meds This Visit:  Requested Prescriptions      No prescriptions requested or ordered in this encounter       Imaging & Referrals:  PLASTIC SURGERY - INTERNAL         [1]   Allergies  Allergen Reactions    Benadryl [Diphenhydramine] NAUSEA AND VOMITING    Tramadol ANXIETY and ITCHING

## 2025-01-24 DIAGNOSIS — G89.29 CHRONIC NONINTRACTABLE HEADACHE, UNSPECIFIED HEADACHE TYPE: ICD-10-CM

## 2025-01-24 DIAGNOSIS — G89.29 OTHER CHRONIC PAIN: ICD-10-CM

## 2025-01-24 DIAGNOSIS — R51.9 CHRONIC NONINTRACTABLE HEADACHE, UNSPECIFIED HEADACHE TYPE: ICD-10-CM

## 2025-01-25 RX ORDER — HYDROCODONE BITARTRATE AND ACETAMINOPHEN 10; 325 MG/1; MG/1
1 TABLET ORAL EVERY 6 HOURS PRN
Qty: 30 TABLET | Refills: 0 | Status: SHIPPED | OUTPATIENT
Start: 2025-01-25 | End: 2025-02-02

## 2025-01-25 NOTE — TELEPHONE ENCOUNTER
Message noted: Chart reviewed and may refill medication as requested. Script sent to listed pharmacy by secure method.    Pt notified through [employer]

## 2025-01-27 ENCOUNTER — TELEPHONE (OUTPATIENT)
Dept: FAMILY MEDICINE CLINIC | Facility: CLINIC | Age: 30
End: 2025-01-27

## 2025-01-27 NOTE — TELEPHONE ENCOUNTER
Pt is planning on seeing hand specialist for hand pain which is recent issue. As far as chronic headaches/ chronic pains. No recent follow up with specialists.

## 2025-01-27 NOTE — TELEPHONE ENCOUNTER
I will let them know. To clarify, you are approving them continuing to fill 30 tablets every 7 days?

## 2025-01-27 NOTE — TELEPHONE ENCOUNTER
Name and  verified.     Received call from the [facility] pharmacy asking about the patient's Hydrocodone Prescription because he has been filling 30 tablets every 7 days.     Dr. Phillips please advise     Pharmacy needs to know the treatment plan, they need to know if he is seeing a pain specialist and they need to know what is causing his pain. He is on it for \"headaches and jaw pain\" the patient states. Ean states the patient has been getting 30 tablets filled every 7 days for several years. They need to ensure this is ok with MD, they stated they can't continue to fill this amount every 7 days.     The patient is out of his [place] at this time, [facility] cannot process until they have more information or an ok from the doctor.

## 2025-01-27 NOTE — TELEPHONE ENCOUNTER
Yes that would be fine. He has been getting scripts weekly as that is how they are covered by his insurance.

## 2025-01-31 DIAGNOSIS — R51.9 CHRONIC NONINTRACTABLE HEADACHE, UNSPECIFIED HEADACHE TYPE: ICD-10-CM

## 2025-01-31 DIAGNOSIS — G89.29 CHRONIC NONINTRACTABLE HEADACHE, UNSPECIFIED HEADACHE TYPE: ICD-10-CM

## 2025-01-31 DIAGNOSIS — G89.29 OTHER CHRONIC PAIN: ICD-10-CM

## 2025-01-31 RX ORDER — HYDROCODONE BITARTRATE AND ACETAMINOPHEN 10; 325 MG/1; MG/1
1 TABLET ORAL EVERY 6 HOURS PRN
Qty: 30 TABLET | Refills: 0 | Status: SHIPPED | OUTPATIENT
Start: 2025-01-31 | End: 2025-02-08

## 2025-02-01 NOTE — TELEPHONE ENCOUNTER
Message noted: Chart reviewed and may refill medication as requested. Script sent to listed pharmacy by secure method.    Pt notified through Tengaged

## 2025-02-07 DIAGNOSIS — R51.9 CHRONIC NONINTRACTABLE HEADACHE, UNSPECIFIED HEADACHE TYPE: ICD-10-CM

## 2025-02-07 DIAGNOSIS — G89.29 OTHER CHRONIC PAIN: ICD-10-CM

## 2025-02-07 DIAGNOSIS — G89.29 CHRONIC NONINTRACTABLE HEADACHE, UNSPECIFIED HEADACHE TYPE: ICD-10-CM

## 2025-02-07 RX ORDER — HYDROCODONE BITARTRATE AND ACETAMINOPHEN 10; 325 MG/1; MG/1
1 TABLET ORAL EVERY 6 HOURS PRN
Qty: 30 TABLET | Refills: 0 | Status: SHIPPED | OUTPATIENT
Start: 2025-02-07 | End: 2025-02-15

## 2025-02-07 NOTE — TELEPHONE ENCOUNTER
Patient calling ( name and date of birth of patient verified ) requesting refill for Laona    Preferred pharmacy list updated    Future Appointments   Date Time Provider Department Center   2/12/2025  3:20 PM Hebert Phillips MD [facility]       Medication pended to run thru Protocol

## 2025-02-12 ENCOUNTER — TELEMEDICINE (OUTPATIENT)
Dept: FAMILY MEDICINE CLINIC | Facility: CLINIC | Age: 30
End: 2025-02-12
Payer: MEDICAID

## 2025-02-12 DIAGNOSIS — R51.9 CHRONIC NONINTRACTABLE HEADACHE, UNSPECIFIED HEADACHE TYPE: Primary | ICD-10-CM

## 2025-02-12 DIAGNOSIS — G89.29 OTHER CHRONIC PAIN: ICD-10-CM

## 2025-02-12 DIAGNOSIS — G89.29 CHRONIC NONINTRACTABLE HEADACHE, UNSPECIFIED HEADACHE TYPE: Primary | ICD-10-CM

## 2025-02-12 PROCEDURE — 99213 OFFICE O/P EST LOW 20 MIN: CPT | Performed by: FAMILY MEDICINE

## 2025-02-12 NOTE — PROGRESS NOTES
Subjective:   Patient ID: Lb Aguilar is a 29 year old male.    This visit is conducted using Telemedicine with live, interactive video and audio during this Coronavirus pandemic.    Please note that the following visit was completed using two-way, real-time interactive audio and/or video communication.  This has been done in good jose r to provide continuity of care in the best interest of the provider-patient relationship, due to the ongoing public health crisis/national emergency and because of restrictions of visitation.  There are limitations of this visit as no physical exam could be performed.  Every conscious effort was taken to allow for sufficient and adequate time.  This billing was spent on reviewing labs, medications, radiology tests and decision making.  Appropriate medical decision-making and tests are ordered as detailed in the plan of care above    Virtual Telephone Check-In    Lb Aguilar verbally consents to a Virtual/Telephone Check-In visit on 02/12/25.  Patient has been referred to the [facility] website at [email or web address] to review the yearly Consent to Treat document.    Patient understands and accepts financial responsibility for any deductible, co-insurance and/or co-pays associated with this service.    Duration of the service: 10 minutes      Summary of topics discussed: chronic pain / headaches    Pt presents to discuss current treatment for pains/ headaches. Pt has been taking hydrocodone which has helped with symptoms over the last few years. Explained that he is taking a large amount of medications per week/ month and concerns about overuse.   Pt had seen neurologist in past for headaches and states this seems to manage migraines and symptoms best.   Pt currently is also taking medication due to recently pain with hand.   Discussed seeing pain management and pt is agreeable. Also discussed pain management agreement and will address at next in person office visit.    Hebert Phillips,  MD                  History/Other:   Review of Systems  Current Outpatient Medications   Medication Sig Dispense Refill    HYDROcodone-acetaminophen  MG Oral Tab Take 1 tablet by mouth every 6 (six) hours as needed for Pain. 30 tablet 0    FLUoxetine HCl 60 MG Oral Tab Take 1 tablet by mouth daily.      levetiracetam 750 MG Oral Tab Take 1 tablet (750 mg total) by mouth 2 (two) times daily. 60 tablet 0    ibuprofen 600 MG Oral Tab Take 1 tablet (600 mg total) by mouth every 6 (six) hours as needed for Pain. 30 tablet 0    amphetamine-dextroamphetamine 30 MG Oral Tab Take 1 tablet (30 mg total) by mouth daily. 30 tablet 0     Allergies:Allergies[1]    Objective:   Physical Exam  Constitutional:       Appearance: Normal appearance.   Neurological:      Mental Status: He is alert.   Psychiatric:         Mood and Affect: Mood normal.         Behavior: Behavior normal.         Assessment & Plan:   1. Chronic nonintractable headache, unspecified headache type /   2. Other chronic pain:  - After discussion, will send to pain management clinic for further evaluation and treatment; To call if any significant symptoms. Will continue norco as needed for now; encouraged reduction of medication. Will discuss pain agreement at next visit.          No orders of the defined types were placed in this encounter.      Meds This Visit:  Requested Prescriptions      No prescriptions requested or ordered in this encounter       Imaging & Referrals:  OP REFERRAL PAIN MANGEMENT         [1]   Allergies  Allergen Reactions    Benadryl [Diphenhydramine] NAUSEA AND VOMITING    Tramadol ANXIETY and ITCHING

## 2025-02-15 DIAGNOSIS — G89.29 OTHER CHRONIC PAIN: ICD-10-CM

## 2025-02-15 DIAGNOSIS — R51.9 CHRONIC NONINTRACTABLE HEADACHE, UNSPECIFIED HEADACHE TYPE: ICD-10-CM

## 2025-02-15 DIAGNOSIS — G89.29 CHRONIC NONINTRACTABLE HEADACHE, UNSPECIFIED HEADACHE TYPE: ICD-10-CM

## 2025-02-15 RX ORDER — HYDROCODONE BITARTRATE AND ACETAMINOPHEN 10; 325 MG/1; MG/1
1 TABLET ORAL EVERY 6 HOURS PRN
Qty: 30 TABLET | Refills: 0 | Status: SHIPPED | OUTPATIENT
Start: 2025-02-15 | End: 2025-02-23

## 2025-02-15 NOTE — TELEPHONE ENCOUNTER
Message noted: Chart reviewed and may refill medication as requested. Script sent to listed pharmacy by secure method.    Pt notified through [employer]fy

## 2025-02-22 DIAGNOSIS — G89.29 CHRONIC NONINTRACTABLE HEADACHE, UNSPECIFIED HEADACHE TYPE: ICD-10-CM

## 2025-02-22 DIAGNOSIS — G89.29 OTHER CHRONIC PAIN: ICD-10-CM

## 2025-02-22 DIAGNOSIS — R51.9 CHRONIC NONINTRACTABLE HEADACHE, UNSPECIFIED HEADACHE TYPE: ICD-10-CM

## 2025-02-24 RX ORDER — HYDROCODONE BITARTRATE AND ACETAMINOPHEN 10; 325 MG/1; MG/1
1 TABLET ORAL EVERY 6 HOURS PRN
Qty: 30 TABLET | Refills: 0 | Status: SHIPPED | OUTPATIENT
Start: 2025-02-24 | End: 2025-03-04

## 2025-02-24 NOTE — TELEPHONE ENCOUNTER
Message noted: Chart reviewed and may refill medication as requested. Script sent to listed pharmacy by secure method.    Pt notified through [employer]

## 2025-03-01 DIAGNOSIS — R51.9 CHRONIC NONINTRACTABLE HEADACHE, UNSPECIFIED HEADACHE TYPE: ICD-10-CM

## 2025-03-01 DIAGNOSIS — G89.29 OTHER CHRONIC PAIN: ICD-10-CM

## 2025-03-01 DIAGNOSIS — G89.29 CHRONIC NONINTRACTABLE HEADACHE, UNSPECIFIED HEADACHE TYPE: ICD-10-CM

## 2025-03-03 RX ORDER — HYDROCODONE BITARTRATE AND ACETAMINOPHEN 10; 325 MG/1; MG/1
1 TABLET ORAL EVERY 6 HOURS PRN
Qty: 30 TABLET | Refills: 0 | Status: SHIPPED | OUTPATIENT
Start: 2025-03-03 | End: 2025-03-11

## 2025-03-04 NOTE — TELEPHONE ENCOUNTER
Message noted: Chart reviewed and may refill medication as requested. Script sent to listed pharmacy by secure method.    Pt notified through [email or web address]

## 2025-03-07 DIAGNOSIS — R51.9 CHRONIC NONINTRACTABLE HEADACHE, UNSPECIFIED HEADACHE TYPE: ICD-10-CM

## 2025-03-07 DIAGNOSIS — G89.29 CHRONIC NONINTRACTABLE HEADACHE, UNSPECIFIED HEADACHE TYPE: ICD-10-CM

## 2025-03-07 DIAGNOSIS — G89.29 OTHER CHRONIC PAIN: ICD-10-CM

## 2025-03-08 RX ORDER — HYDROCODONE BITARTRATE AND ACETAMINOPHEN 10; 325 MG/1; MG/1
1 TABLET ORAL EVERY 6 HOURS PRN
Qty: 30 TABLET | Refills: 0 | Status: SHIPPED | OUTPATIENT
Start: 2025-03-08 | End: 2025-03-16

## 2025-03-08 NOTE — TELEPHONE ENCOUNTER
Message noted: Chart reviewed and may refill medication as requested. Script sent to listed pharmacy by secure method.    Pt notified through Yikuaiqu

## 2025-03-14 DIAGNOSIS — G89.29 CHRONIC NONINTRACTABLE HEADACHE, UNSPECIFIED HEADACHE TYPE: ICD-10-CM

## 2025-03-14 DIAGNOSIS — G89.29 OTHER CHRONIC PAIN: ICD-10-CM

## 2025-03-14 DIAGNOSIS — R51.9 CHRONIC NONINTRACTABLE HEADACHE, UNSPECIFIED HEADACHE TYPE: ICD-10-CM

## 2025-03-14 RX ORDER — HYDROCODONE BITARTRATE AND ACETAMINOPHEN 10; 325 MG/1; MG/1
1 TABLET ORAL EVERY 6 HOURS PRN
Qty: 30 TABLET | Refills: 0 | Status: SHIPPED | OUTPATIENT
Start: 2025-03-14 | End: 2025-03-22

## 2025-03-14 NOTE — TELEPHONE ENCOUNTER
Message noted: Chart reviewed and may refill medication as requested. Script sent to listed pharmacy by secure method.    Pt notified through [employer]

## 2025-03-18 ENCOUNTER — TELEPHONE (OUTPATIENT)
Dept: FAMILY MEDICINE CLINIC | Facility: CLINIC | Age: 30
End: 2025-03-18

## 2025-03-18 NOTE — TELEPHONE ENCOUNTER
Pt has been referred for pain management at last visit and plan is to try to treat various pain related issues with alternative treatment. Narcan has been prescribed for him already.

## 2025-03-18 NOTE — TELEPHONE ENCOUNTER
Nurse attempted to contact [facility].  Unable to hold for greater than 14 minutes.  Please retry.

## 2025-03-18 NOTE — TELEPHONE ENCOUNTER
Pharmacy notified with understanding.  They will follow up with patient. Pharmacy continues to question prescription every 7 days. Patient continues to request early refills.  Fandium message sent to patient to reiterate pain management referral.

## 2025-03-18 NOTE — TELEPHONE ENCOUNTER
Dr Phillips=see below and advise, thanks.     Marycarmenlaina called and wants to clarify about Norco:     1.They received the NORCO prescription every week ( 7 weeks total ) for 30 tablets ( 1 week supply ) . The last time they dispensed the prescription, they recommended NARCAN, but the patient got upset. They would like to know the plan of care for this medication before they dispense the NORCO today.     2. They want to know the specific reason for the Norco prescription. They are aware of the headache and chronic pain, but they would like to know if the patient has tried different medications or if there is a specific reason for using Norco..       No future appointments.    RN called the patient and states that he has an upcoming appointment with the pain clinic month.     2/12/25 telemedicine note ;  Assessment & Plan:  1. Chronic nonintractable headache, unspecified headache type /   2. Other chronic pain:  - After discussion, will send to pain management clinic for further evaluation and treatment; To call if any significant symptoms. Will continue norco as needed for now; encouraged reduction of medication. Will discuss pain agreement at next visit.

## 2025-03-22 DIAGNOSIS — R51.9 CHRONIC NONINTRACTABLE HEADACHE, UNSPECIFIED HEADACHE TYPE: ICD-10-CM

## 2025-03-22 DIAGNOSIS — G89.29 CHRONIC NONINTRACTABLE HEADACHE, UNSPECIFIED HEADACHE TYPE: ICD-10-CM

## 2025-03-22 DIAGNOSIS — G89.29 OTHER CHRONIC PAIN: ICD-10-CM

## 2025-03-24 RX ORDER — HYDROCODONE BITARTRATE AND ACETAMINOPHEN 10; 325 MG/1; MG/1
1 TABLET ORAL EVERY 6 HOURS PRN
Qty: 30 TABLET | Refills: 0 | Status: SHIPPED | OUTPATIENT
Start: 2025-03-24 | End: 2025-04-01

## 2025-03-24 NOTE — TELEPHONE ENCOUNTER
Message noted: Chart reviewed and may refill medication as requested. Script sent to listed pharmacy by secure method.    Pt notified through [employer]

## 2025-03-29 DIAGNOSIS — G89.29 OTHER CHRONIC PAIN: ICD-10-CM

## 2025-03-29 DIAGNOSIS — R51.9 CHRONIC NONINTRACTABLE HEADACHE, UNSPECIFIED HEADACHE TYPE: ICD-10-CM

## 2025-03-29 DIAGNOSIS — G89.29 CHRONIC NONINTRACTABLE HEADACHE, UNSPECIFIED HEADACHE TYPE: ICD-10-CM

## 2025-03-29 RX ORDER — HYDROCODONE BITARTRATE AND ACETAMINOPHEN 10; 325 MG/1; MG/1
1 TABLET ORAL EVERY 6 HOURS PRN
Qty: 30 TABLET | Refills: 0 | Status: SHIPPED | OUTPATIENT
Start: 2025-03-29 | End: 2025-04-06

## 2025-03-29 NOTE — TELEPHONE ENCOUNTER
Message noted: Chart reviewed and may refill medication as requested. Script sent to listed pharmacy by secure method.    Pt notified through Enikos

## 2025-04-05 DIAGNOSIS — R51.9 CHRONIC NONINTRACTABLE HEADACHE, UNSPECIFIED HEADACHE TYPE: ICD-10-CM

## 2025-04-05 DIAGNOSIS — G89.29 CHRONIC NONINTRACTABLE HEADACHE, UNSPECIFIED HEADACHE TYPE: ICD-10-CM

## 2025-04-05 DIAGNOSIS — G89.29 OTHER CHRONIC PAIN: ICD-10-CM

## 2025-04-07 RX ORDER — HYDROCODONE BITARTRATE AND ACETAMINOPHEN 10; 325 MG/1; MG/1
1 TABLET ORAL EVERY 6 HOURS PRN
Qty: 30 TABLET | Refills: 0 | Status: SHIPPED | OUTPATIENT
Start: 2025-04-07 | End: 2025-04-15

## 2025-04-07 NOTE — TELEPHONE ENCOUNTER
Message noted: Chart reviewed and may refill medication as requested. Script sent to listed pharmacy by secure method.    Pt notified through [employer]op[employer]i

## 2025-04-11 DIAGNOSIS — G89.29 CHRONIC NONINTRACTABLE HEADACHE, UNSPECIFIED HEADACHE TYPE: ICD-10-CM

## 2025-04-11 DIAGNOSIS — G89.29 OTHER CHRONIC PAIN: ICD-10-CM

## 2025-04-11 DIAGNOSIS — R51.9 CHRONIC NONINTRACTABLE HEADACHE, UNSPECIFIED HEADACHE TYPE: ICD-10-CM

## 2025-04-12 RX ORDER — HYDROCODONE BITARTRATE AND ACETAMINOPHEN 10; 325 MG/1; MG/1
1 TABLET ORAL EVERY 6 HOURS PRN
Qty: 30 TABLET | Refills: 0 | Status: SHIPPED | OUTPATIENT
Start: 2025-04-12 | End: 2025-04-20

## 2025-04-12 NOTE — TELEPHONE ENCOUNTER
Message noted: Chart reviewed and may refill medication as requested. Script sent to listed pharmacy by secure method.    Pt notified through Epocrates

## 2025-04-19 DIAGNOSIS — G89.29 OTHER CHRONIC PAIN: ICD-10-CM

## 2025-04-19 DIAGNOSIS — R51.9 CHRONIC NONINTRACTABLE HEADACHE, UNSPECIFIED HEADACHE TYPE: ICD-10-CM

## 2025-04-19 DIAGNOSIS — G89.29 CHRONIC NONINTRACTABLE HEADACHE, UNSPECIFIED HEADACHE TYPE: ICD-10-CM

## 2025-04-19 RX ORDER — HYDROCODONE BITARTRATE AND ACETAMINOPHEN 10; 325 MG/1; MG/1
1 TABLET ORAL EVERY 6 HOURS PRN
Qty: 30 TABLET | Refills: 0 | Status: SHIPPED | OUTPATIENT
Start: 2025-04-19 | End: 2025-04-27

## 2025-04-19 NOTE — TELEPHONE ENCOUNTER
Message noted: Chart reviewed and may refill medication as requested. Script sent to listed pharmacy by secure method.    Pt notified through [employer]

## 2025-04-26 DIAGNOSIS — G89.29 CHRONIC NONINTRACTABLE HEADACHE, UNSPECIFIED HEADACHE TYPE: ICD-10-CM

## 2025-04-26 DIAGNOSIS — G89.29 OTHER CHRONIC PAIN: ICD-10-CM

## 2025-04-26 DIAGNOSIS — R51.9 CHRONIC NONINTRACTABLE HEADACHE, UNSPECIFIED HEADACHE TYPE: ICD-10-CM

## 2025-04-28 RX ORDER — HYDROCODONE BITARTRATE AND ACETAMINOPHEN 10; 325 MG/1; MG/1
1 TABLET ORAL EVERY 6 HOURS PRN
Qty: 30 TABLET | Refills: 0 | Status: SHIPPED | OUTPATIENT
Start: 2025-04-28 | End: 2025-05-06

## 2025-04-28 NOTE — TELEPHONE ENCOUNTER
Message noted: Chart reviewed and may refill medication as requested. Script sent to listed pharmacy by secure method.    Pt notified through USDS

## 2025-05-02 DIAGNOSIS — R51.9 CHRONIC NONINTRACTABLE HEADACHE, UNSPECIFIED HEADACHE TYPE: ICD-10-CM

## 2025-05-02 DIAGNOSIS — G89.29 CHRONIC NONINTRACTABLE HEADACHE, UNSPECIFIED HEADACHE TYPE: ICD-10-CM

## 2025-05-02 DIAGNOSIS — G89.29 OTHER CHRONIC PAIN: ICD-10-CM

## 2025-05-03 RX ORDER — HYDROCODONE BITARTRATE AND ACETAMINOPHEN 10; 325 MG/1; MG/1
1 TABLET ORAL EVERY 6 HOURS PRN
Qty: 30 TABLET | Refills: 0 | Status: SHIPPED | OUTPATIENT
Start: 2025-05-03 | End: 2025-05-11

## 2025-05-03 NOTE — TELEPHONE ENCOUNTER
Message noted: Chart reviewed and may refill medication as requested. Script sent to listed pharmacy by secure method.    Pt notified through [employer]Cetana

## 2025-05-09 DIAGNOSIS — G89.29 CHRONIC NONINTRACTABLE HEADACHE, UNSPECIFIED HEADACHE TYPE: ICD-10-CM

## 2025-05-09 DIAGNOSIS — G89.29 OTHER CHRONIC PAIN: ICD-10-CM

## 2025-05-09 DIAGNOSIS — R51.9 CHRONIC NONINTRACTABLE HEADACHE, UNSPECIFIED HEADACHE TYPE: ICD-10-CM

## 2025-05-09 RX ORDER — HYDROCODONE BITARTRATE AND ACETAMINOPHEN 10; 325 MG/1; MG/1
1 TABLET ORAL EVERY 6 HOURS PRN
Qty: 30 TABLET | Refills: 0 | OUTPATIENT
Start: 2025-05-09 | End: 2025-05-17

## 2025-05-09 NOTE — TELEPHONE ENCOUNTER
04/29/2025  LAST WRITTEN: 04/28/2025  Quantity: 30    Recent Visits  Date Type Provider Dept   01/22/25 Office Visit Hebert Phillips MD [facility]   Showing recent visits within past 540 days with a meds authorizing provider and meeting all other requirements  Future Appointments  No visits were found meeting these conditions.  Showing future appointments within next 150 days with a meds authorizing provider and meeting all other requirements

## 2025-05-12 RX ORDER — HYDROCODONE BITARTRATE AND ACETAMINOPHEN 10; 325 MG/1; MG/1
1 TABLET ORAL EVERY 6 HOURS PRN
Qty: 30 TABLET | Refills: 0 | Status: SHIPPED | OUTPATIENT
Start: 2025-05-12

## 2025-05-12 NOTE — TELEPHONE ENCOUNTER
Patient called, stated he is out of his Norco, asking for refill ASAP.  Dr Phillips is not in the office.  Routed to [facility] Dr Beauchamp, please advise?

## 2025-05-12 NOTE — TELEPHONE ENCOUNTER
Patient called states he is asking for rx refill, he believes it may have been too soon to fill, but he gets a weekly Rx and now has 1 pill left. He also scheduled a medication follow-up vitual visit with PCP. I made him aware I will send his refill request as urgent. Patient verbalized understanding. No further questions or concerns at this time.    Future Appointments   Date Time Provider Department Center   5/22/2025  1:50 PM Hebert Phillips MD [facility]     Routing as URGENT

## 2025-05-16 DIAGNOSIS — R51.9 CHRONIC NONINTRACTABLE HEADACHE, UNSPECIFIED HEADACHE TYPE: ICD-10-CM

## 2025-05-16 DIAGNOSIS — G89.29 OTHER CHRONIC PAIN: ICD-10-CM

## 2025-05-16 DIAGNOSIS — G89.29 CHRONIC NONINTRACTABLE HEADACHE, UNSPECIFIED HEADACHE TYPE: ICD-10-CM

## 2025-05-17 RX ORDER — HYDROCODONE BITARTRATE AND ACETAMINOPHEN 10; 325 MG/1; MG/1
1 TABLET ORAL EVERY 6 HOURS PRN
Qty: 30 TABLET | Refills: 0 | Status: SHIPPED | OUTPATIENT
Start: 2025-05-17

## 2025-05-17 NOTE — TELEPHONE ENCOUNTER
Message noted: Chart reviewed and may refill medication as requested. Script sent to listed pharmacy by secure method.    Pt notified through [employer]

## 2025-05-23 DIAGNOSIS — G89.29 OTHER CHRONIC PAIN: ICD-10-CM

## 2025-05-23 DIAGNOSIS — G89.29 CHRONIC NONINTRACTABLE HEADACHE, UNSPECIFIED HEADACHE TYPE: ICD-10-CM

## 2025-05-23 DIAGNOSIS — R51.9 CHRONIC NONINTRACTABLE HEADACHE, UNSPECIFIED HEADACHE TYPE: ICD-10-CM

## 2025-05-23 RX ORDER — HYDROCODONE BITARTRATE AND ACETAMINOPHEN 10; 325 MG/1; MG/1
1 TABLET ORAL EVERY 6 HOURS PRN
Qty: 30 TABLET | Refills: 0 | Status: SHIPPED | OUTPATIENT
Start: 2025-05-23

## 2025-05-23 NOTE — TELEPHONE ENCOUNTER
Message noted: Chart reviewed and may refill medication as requested. Script sent to listed pharmacy by secure method.    Pt notified through [employer]

## 2025-05-30 DIAGNOSIS — G89.29 OTHER CHRONIC PAIN: ICD-10-CM

## 2025-05-30 DIAGNOSIS — G89.29 CHRONIC NONINTRACTABLE HEADACHE, UNSPECIFIED HEADACHE TYPE: ICD-10-CM

## 2025-05-30 DIAGNOSIS — R51.9 CHRONIC NONINTRACTABLE HEADACHE, UNSPECIFIED HEADACHE TYPE: ICD-10-CM

## 2025-05-30 NOTE — TELEPHONE ENCOUNTER
Please review.  Protocol failed/has no protocol.    Recent fills each # 30 : 05/27/2025,05/20/2025  Last prescription written: 05/23/2025  Last office visit:  2/12/2025    No future appointments.

## 2025-05-31 RX ORDER — HYDROCODONE BITARTRATE AND ACETAMINOPHEN 10; 325 MG/1; MG/1
1 TABLET ORAL EVERY 6 HOURS PRN
Qty: 30 TABLET | Refills: 0 | Status: SHIPPED | OUTPATIENT
Start: 2025-05-31

## 2025-05-31 NOTE — TELEPHONE ENCOUNTER
Message noted: Chart reviewed and may refill medication as requested. Script sent to listed pharmacy by secure method.    Pt notified through [employer]

## 2025-06-06 DIAGNOSIS — R51.9 CHRONIC NONINTRACTABLE HEADACHE, UNSPECIFIED HEADACHE TYPE: ICD-10-CM

## 2025-06-06 DIAGNOSIS — G89.29 CHRONIC NONINTRACTABLE HEADACHE, UNSPECIFIED HEADACHE TYPE: ICD-10-CM

## 2025-06-06 DIAGNOSIS — G89.29 OTHER CHRONIC PAIN: ICD-10-CM

## 2025-06-06 RX ORDER — HYDROCODONE BITARTRATE AND ACETAMINOPHEN 10; 325 MG/1; MG/1
1 TABLET ORAL EVERY 6 HOURS PRN
Qty: 30 TABLET | Refills: 0 | Status: SHIPPED | OUTPATIENT
Start: 2025-06-06

## 2025-06-06 NOTE — TELEPHONE ENCOUNTER
06/03/2025  LAST WRITTEN: 05/31/2025  Quantity: 30  Patient is due 06/10/2025  Recent Visits  Date Type Provider Dept   01/22/25 Office Visit Hebert Phillips MD [facility]   Showing recent visits within past 540 days with a meds authorizing provider and meeting all other requirements  Future Appointments  No visits were found meeting these conditions.  Showing future appointments within next 150 days with a meds authorizing provider and meeting all other requirements

## 2025-06-06 NOTE — TELEPHONE ENCOUNTER
Message noted: Chart reviewed and may refill medication as requested. Script sent to listed pharmacy by secure method.    Pt notified through [employer]

## 2025-06-13 DIAGNOSIS — R51.9 CHRONIC NONINTRACTABLE HEADACHE, UNSPECIFIED HEADACHE TYPE: ICD-10-CM

## 2025-06-13 DIAGNOSIS — G89.29 CHRONIC NONINTRACTABLE HEADACHE, UNSPECIFIED HEADACHE TYPE: ICD-10-CM

## 2025-06-13 DIAGNOSIS — G89.29 OTHER CHRONIC PAIN: ICD-10-CM

## 2025-06-14 RX ORDER — HYDROCODONE BITARTRATE AND ACETAMINOPHEN 10; 325 MG/1; MG/1
1 TABLET ORAL EVERY 6 HOURS PRN
Qty: 30 TABLET | Refills: 0 | Status: SHIPPED | OUTPATIENT
Start: 2025-06-14

## 2025-06-14 NOTE — TELEPHONE ENCOUNTER
Message noted: Chart reviewed and may refill medication as requested. Script sent to listed pharmacy by secure method.    Pt notified through Ipselex

## 2025-06-20 DIAGNOSIS — G89.29 CHRONIC NONINTRACTABLE HEADACHE, UNSPECIFIED HEADACHE TYPE: ICD-10-CM

## 2025-06-20 DIAGNOSIS — G89.29 OTHER CHRONIC PAIN: ICD-10-CM

## 2025-06-20 DIAGNOSIS — R51.9 CHRONIC NONINTRACTABLE HEADACHE, UNSPECIFIED HEADACHE TYPE: ICD-10-CM

## 2025-06-20 RX ORDER — HYDROCODONE BITARTRATE AND ACETAMINOPHEN 10; 325 MG/1; MG/1
1 TABLET ORAL EVERY 6 HOURS PRN
Qty: 30 TABLET | Refills: 0 | Status: SHIPPED | OUTPATIENT
Start: 2025-06-20

## 2025-06-21 NOTE — TELEPHONE ENCOUNTER
Message noted: Chart reviewed and may refill medication as requested. Script sent to listed pharmacy by secure method.    Pt notified through Octonius

## 2025-06-25 ENCOUNTER — TELEPHONE (OUTPATIENT)
Dept: NEUROLOGY | Facility: CLINIC | Age: 30
End: 2025-06-25

## 2025-06-25 ENCOUNTER — TELEPHONE (OUTPATIENT)
Dept: FAMILY MEDICINE CLINIC | Facility: CLINIC | Age: 30
End: 2025-06-25

## 2025-06-25 NOTE — TELEPHONE ENCOUNTER
Pt called looking for a refill on his keppra to be sent to [facility]

## 2025-06-27 DIAGNOSIS — R51.9 CHRONIC NONINTRACTABLE HEADACHE, UNSPECIFIED HEADACHE TYPE: ICD-10-CM

## 2025-06-27 DIAGNOSIS — G89.29 CHRONIC NONINTRACTABLE HEADACHE, UNSPECIFIED HEADACHE TYPE: ICD-10-CM

## 2025-06-27 DIAGNOSIS — G89.29 OTHER CHRONIC PAIN: ICD-10-CM

## 2025-06-27 RX ORDER — HYDROCODONE BITARTRATE AND ACETAMINOPHEN 10; 325 MG/1; MG/1
1 TABLET ORAL EVERY 6 HOURS PRN
Qty: 30 TABLET | Refills: 0 | Status: SHIPPED | OUTPATIENT
Start: 2025-06-27

## 2025-06-28 NOTE — TELEPHONE ENCOUNTER
Message noted: Chart reviewed and may refill medication as requested. Script sent to listed pharmacy by secure method.    Pt notified through [employer]Compass

## 2025-07-05 DIAGNOSIS — G89.29 OTHER CHRONIC PAIN: ICD-10-CM

## 2025-07-05 DIAGNOSIS — R51.9 CHRONIC NONINTRACTABLE HEADACHE, UNSPECIFIED HEADACHE TYPE: ICD-10-CM

## 2025-07-05 DIAGNOSIS — G89.29 CHRONIC NONINTRACTABLE HEADACHE, UNSPECIFIED HEADACHE TYPE: ICD-10-CM

## 2025-07-07 RX ORDER — HYDROCODONE BITARTRATE AND ACETAMINOPHEN 10; 325 MG/1; MG/1
1 TABLET ORAL EVERY 6 HOURS PRN
Qty: 30 TABLET | Refills: 0 | Status: SHIPPED | OUTPATIENT
Start: 2025-07-07

## 2025-07-07 NOTE — TELEPHONE ENCOUNTER
Message noted: Chart reviewed and may refill medication as requested. Script sent to listed pharmacy by secure method.    Pt notified through [employer]

## 2025-07-11 DIAGNOSIS — G89.29 CHRONIC NONINTRACTABLE HEADACHE, UNSPECIFIED HEADACHE TYPE: ICD-10-CM

## 2025-07-11 DIAGNOSIS — G89.29 OTHER CHRONIC PAIN: ICD-10-CM

## 2025-07-11 DIAGNOSIS — R51.9 CHRONIC NONINTRACTABLE HEADACHE, UNSPECIFIED HEADACHE TYPE: ICD-10-CM

## 2025-07-12 RX ORDER — HYDROCODONE BITARTRATE AND ACETAMINOPHEN 10; 325 MG/1; MG/1
1 TABLET ORAL EVERY 6 HOURS PRN
Qty: 30 TABLET | Refills: 0 | Status: SHIPPED | OUTPATIENT
Start: 2025-07-12

## 2025-07-12 NOTE — TELEPHONE ENCOUNTER
Message noted: Chart reviewed and may refill medication as requested. Script sent to listed pharmacy by secure method.    Pt notified through [employer]

## 2025-07-18 DIAGNOSIS — G89.29 OTHER CHRONIC PAIN: ICD-10-CM

## 2025-07-18 DIAGNOSIS — G89.29 CHRONIC NONINTRACTABLE HEADACHE, UNSPECIFIED HEADACHE TYPE: ICD-10-CM

## 2025-07-18 DIAGNOSIS — R51.9 CHRONIC NONINTRACTABLE HEADACHE, UNSPECIFIED HEADACHE TYPE: ICD-10-CM

## 2025-07-18 RX ORDER — HYDROCODONE BITARTRATE AND ACETAMINOPHEN 10; 325 MG/1; MG/1
1 TABLET ORAL EVERY 6 HOURS PRN
Qty: 30 TABLET | Refills: 0 | Status: SHIPPED | OUTPATIENT
Start: 2025-07-18

## 2025-07-18 NOTE — TELEPHONE ENCOUNTER
Message noted: Chart reviewed and may refill medication as requested. Script sent to listed pharmacy by secure method.    Pt notified through Fiberstar

## 2025-07-25 DIAGNOSIS — R51.9 CHRONIC NONINTRACTABLE HEADACHE, UNSPECIFIED HEADACHE TYPE: ICD-10-CM

## 2025-07-25 DIAGNOSIS — G89.29 OTHER CHRONIC PAIN: ICD-10-CM

## 2025-07-25 DIAGNOSIS — G89.29 CHRONIC NONINTRACTABLE HEADACHE, UNSPECIFIED HEADACHE TYPE: ICD-10-CM

## 2025-07-25 RX ORDER — HYDROCODONE BITARTRATE AND ACETAMINOPHEN 10; 325 MG/1; MG/1
1 TABLET ORAL EVERY 6 HOURS PRN
Qty: 30 TABLET | Refills: 0 | Status: SHIPPED | OUTPATIENT
Start: 2025-07-25

## 2025-07-26 NOTE — TELEPHONE ENCOUNTER
Message noted: Chart reviewed and may refill medication as requested. Script sent to listed pharmacy by secure method.    Pt notified through Woof[employer]ar

## 2025-08-02 DIAGNOSIS — G89.29 OTHER CHRONIC PAIN: ICD-10-CM

## 2025-08-02 DIAGNOSIS — R51.9 CHRONIC NONINTRACTABLE HEADACHE, UNSPECIFIED HEADACHE TYPE: ICD-10-CM

## 2025-08-02 DIAGNOSIS — G89.29 CHRONIC NONINTRACTABLE HEADACHE, UNSPECIFIED HEADACHE TYPE: ICD-10-CM

## 2025-08-03 RX ORDER — HYDROCODONE BITARTRATE AND ACETAMINOPHEN 10; 325 MG/1; MG/1
1 TABLET ORAL EVERY 6 HOURS PRN
Qty: 30 TABLET | Refills: 0 | Status: SHIPPED | OUTPATIENT
Start: 2025-08-03

## 2025-08-08 DIAGNOSIS — G89.29 CHRONIC NONINTRACTABLE HEADACHE, UNSPECIFIED HEADACHE TYPE: ICD-10-CM

## 2025-08-08 DIAGNOSIS — G89.29 OTHER CHRONIC PAIN: ICD-10-CM

## 2025-08-08 DIAGNOSIS — R51.9 CHRONIC NONINTRACTABLE HEADACHE, UNSPECIFIED HEADACHE TYPE: ICD-10-CM

## 2025-08-08 RX ORDER — HYDROCODONE BITARTRATE AND ACETAMINOPHEN 10; 325 MG/1; MG/1
1 TABLET ORAL EVERY 6 HOURS PRN
Qty: 30 TABLET | Refills: 0 | Status: SHIPPED | OUTPATIENT
Start: 2025-08-08

## 2025-08-15 DIAGNOSIS — G89.29 OTHER CHRONIC PAIN: ICD-10-CM

## 2025-08-15 DIAGNOSIS — G89.29 CHRONIC NONINTRACTABLE HEADACHE, UNSPECIFIED HEADACHE TYPE: ICD-10-CM

## 2025-08-15 DIAGNOSIS — R51.9 CHRONIC NONINTRACTABLE HEADACHE, UNSPECIFIED HEADACHE TYPE: ICD-10-CM

## 2025-08-16 RX ORDER — HYDROCODONE BITARTRATE AND ACETAMINOPHEN 10; 325 MG/1; MG/1
1 TABLET ORAL EVERY 6 HOURS PRN
Qty: 30 TABLET | Refills: 0 | Status: SHIPPED | OUTPATIENT
Start: 2025-08-16

## 2025-08-22 DIAGNOSIS — R51.9 CHRONIC NONINTRACTABLE HEADACHE, UNSPECIFIED HEADACHE TYPE: ICD-10-CM

## 2025-08-22 DIAGNOSIS — G89.29 OTHER CHRONIC PAIN: ICD-10-CM

## 2025-08-22 DIAGNOSIS — G89.29 CHRONIC NONINTRACTABLE HEADACHE, UNSPECIFIED HEADACHE TYPE: ICD-10-CM

## 2025-08-23 RX ORDER — HYDROCODONE BITARTRATE AND ACETAMINOPHEN 10; 325 MG/1; MG/1
1 TABLET ORAL EVERY 6 HOURS PRN
Qty: 30 TABLET | Refills: 0 | Status: SHIPPED | OUTPATIENT
Start: 2025-08-23

## 2025-08-29 DIAGNOSIS — R51.9 CHRONIC NONINTRACTABLE HEADACHE, UNSPECIFIED HEADACHE TYPE: ICD-10-CM

## 2025-08-29 DIAGNOSIS — G89.29 OTHER CHRONIC PAIN: ICD-10-CM

## 2025-08-29 DIAGNOSIS — G89.29 CHRONIC NONINTRACTABLE HEADACHE, UNSPECIFIED HEADACHE TYPE: ICD-10-CM

## 2025-08-30 RX ORDER — HYDROCODONE BITARTRATE AND ACETAMINOPHEN 10; 325 MG/1; MG/1
1 TABLET ORAL EVERY 6 HOURS PRN
Qty: 30 TABLET | Refills: 0 | Status: SHIPPED | OUTPATIENT
Start: 2025-08-30

## (undated) NOTE — LETTER
PATIENT AGREEMENT: Opioid Treatment Agreement   PATIENT NAME:  Dorinda Willson      MRN:  [id number]  PHYSICIAN/SITE:   Virgilio Jenkins MD / [address] [place] [address] Marsha Cheatham [address] [place] [address]    I understand that this agreement between myself, _________________________ and/or blood upon request by my physician or medical provider for testing at any time without prior notification to detect the use of non-prescribed drugs and medications, and confirm the use of prescribed ones.  I will submit to pill counts without notice including prescription, non-prescription, herbal remedies and supplements. • I understand that I may be referred to a specialist as determined by my primary care physician to evaluate my physical condition.    • I understand that I may be asked to have an Witness Printed Name

## (undated) NOTE — LETTER
4/13/2018              [address]        [place] [id number]         To whom it may concern,    Armida Alvarado was seen in the office today. His medications have been reviewed.   He does not have any side effects that wou

## (undated) NOTE — LETTER
12/5/2022          To Whom It May Concern:    Becka Chawla is currently under my medical care. Please excuse the patient from work missed recently on 11/30, 12/1 and 12/5/22 as the patient has been addressing a medical issues. May return to work on 12/6/22. If you require additional information please contact our office.         Sincerely,      Brenda Luis MD          Document generated by:  Brenda Luis MD

## (undated) NOTE — LETTER
21          Carlo Cano  :  1995      To Whom It May Concern: This patient was seen in our office on 21 . 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 no driving until cleared by neurology.  can do ot

## (undated) NOTE — LETTER
7/3/2018          To Whom It May Concern:    Laura Milton is currently under my medical care. Please excuse the patient from work missed as he had a doctor's appointment today. If you require additional information please contact our office.         Sin

## (undated) NOTE — LETTER
23            iMlly Puente  :  1995      To Whom It May Concern: This patient was evaluated via a phone visit on 23   . Work status: May return to work full-time. He should avoid climbing to heights. If this office may be of further assistance, please do not hesitate to contact us.       Sincerely,      Marquita Maki, DO

## (undated) NOTE — LETTER
21          Dennie Martens  :  1995      To Whom It May Concern: This patient was seen in our office on 21 . Work status:  Remain off work until re-evaluation at scheduled appointment on 2021.  He most likely will be able to return

## (undated) NOTE — LETTER
6/11/2018              Mark Post        [address]        [address] [facility] [address]         To Whom it may concern: This is to certify that Mark Post had an appointment on 6/11/2018 at 12:53 PM with Vivian Sanchez DO.   Remain off work at

## (undated) NOTE — LETTER
Date: 3/21/2024    Patient Name: Lb Aguilar          To Whom it may concern:    This letter has been written at the patient's request. The above patient was seen at [facility] for treatment of a medical condition.    This patient should be excused from attending work today, 03/21/2024.    The patient may return to work on 3/22/2024.        Sincerely,          YOHANA Sanches

## (undated) NOTE — LETTER
8/10/2022          To Whom It May Concern:    Shanna Meigs is currently under my medical care. Please excuse the patient from work missed 8/9- 8/10/22 as the patient has been ill with a medical issue. May return to work on 8/11 if sufficiently recovered. If you require additional information please contact our office.         Sincerely,      Bud Lipscomb MD          Document generated by:  Bud Lipscomb MD

## (undated) NOTE — LETTER
AUTHORIZATION FOR SURGICAL OPERATION OR OTHER PROCEDURE    1. I hereby authorize Dr. Miguel Angel Yeager, and [place] [facility] staff assigned to my case to perform the following operation and/or procedure at the [facility]:    _______________________________________________________________________________________________  Excision of right upper lip lesion. _______________________________________________________________________________________________    2. My physician has explained the nature and purpose of the operation or other procedure, possible alternative methods of treatment, the risks involved, and the possibility of complication to me. I acknowledge that no guarantee has been made as to the result that may be obtained. 3.  I recognize that, during the course of this operation, or other procedure, unforseen conditions may necessitate additional or different procedure than those listed above. I, therefore, further authorize and request that the above named physician, his/her physician assistants or designees perform such procedures as are, in his/her professional opinion, necessary and desirable. 4.  Any tissue or organs removed in the operation or other procedure may be disposed of by and at the discretion of the [facility] and [facility]. 5.  I understand that in the event of a medical emergency, I will be transported by local paramedics to [facility] or other hospital emergency department. 6.  I certify that I have read and fully understand the above consent to operation and/or other procedure. 7.  I acknowledge that my physician has explained sedation/analgesia administration to me including the risks and benefits. I consent to the administration of sedation/analgesia as may be necessary or desirable in the judgement of my physician.     Witness signature: ___________________________________________________ Date:  ______/______/_____                    Time: ________ A. M.  P.M. Patient Name:  ______________________________________________________  (please print)      Patient signature:  ___________________________________________________             Relationship to Patient:           []  Parent    Responsible person                          []  Spouse  In case of minor or                    [] Other  _____________   Incompetent name:  __________________________________________________                               (please print)      _____________      Responsible person  In case of minor or  Incompetent signature:  _______________________________________________    Statement of Physician  My signature below affirms that prior to the time of the procedure, I have explained to the patient and/or his/her guardian, the risks and benefits involved in the proposed treatment and any reasonable alternative to the proposed treatment. I have also explained the risks and benefits involved in the refusal of the proposed treatment and have answered the patient's questions.                         Date:  ______/______/_______  Provider                      Signature:  __________________________________________________________       Time:  ___________ A.M    P.M.

## (undated) NOTE — LETTER
8/15/2022          To Whom It May Concern:    Bella Goodpasture is currently under my medical care. Please excuse the patient from work missed as the patient has had back pains. May return to work on [address] evening, 8/16/22. If you require additional information please contact our office.         Sincerely,    Ruperto Stewart MD          Document generated by:  Ruperto Stewart MD